# Patient Record
Sex: MALE | Race: WHITE | NOT HISPANIC OR LATINO | URBAN - METROPOLITAN AREA
[De-identification: names, ages, dates, MRNs, and addresses within clinical notes are randomized per-mention and may not be internally consistent; named-entity substitution may affect disease eponyms.]

---

## 2017-06-19 ENCOUNTER — OUTPATIENT (OUTPATIENT)
Dept: OUTPATIENT SERVICES | Facility: HOSPITAL | Age: 67
LOS: 1 days | Discharge: HOME | End: 2017-06-19

## 2017-06-28 DIAGNOSIS — I48.91 UNSPECIFIED ATRIAL FIBRILLATION: ICD-10-CM

## 2017-07-03 ENCOUNTER — OUTPATIENT (OUTPATIENT)
Dept: OUTPATIENT SERVICES | Facility: HOSPITAL | Age: 67
LOS: 1 days | Discharge: HOME | End: 2017-07-03

## 2017-07-03 DIAGNOSIS — I48.91 UNSPECIFIED ATRIAL FIBRILLATION: ICD-10-CM

## 2017-07-17 ENCOUNTER — OUTPATIENT (OUTPATIENT)
Dept: OUTPATIENT SERVICES | Facility: HOSPITAL | Age: 67
LOS: 1 days | Discharge: HOME | End: 2017-07-17

## 2017-07-17 DIAGNOSIS — I48.91 UNSPECIFIED ATRIAL FIBRILLATION: ICD-10-CM

## 2017-07-31 ENCOUNTER — OUTPATIENT (OUTPATIENT)
Dept: OUTPATIENT SERVICES | Facility: HOSPITAL | Age: 67
LOS: 1 days | Discharge: HOME | End: 2017-07-31

## 2017-07-31 DIAGNOSIS — I48.91 UNSPECIFIED ATRIAL FIBRILLATION: ICD-10-CM

## 2017-08-14 ENCOUNTER — OUTPATIENT (OUTPATIENT)
Dept: OUTPATIENT SERVICES | Facility: HOSPITAL | Age: 67
LOS: 1 days | Discharge: HOME | End: 2017-08-14

## 2017-08-14 DIAGNOSIS — I48.91 UNSPECIFIED ATRIAL FIBRILLATION: ICD-10-CM

## 2017-08-28 ENCOUNTER — OUTPATIENT (OUTPATIENT)
Dept: OUTPATIENT SERVICES | Facility: HOSPITAL | Age: 67
LOS: 1 days | Discharge: HOME | End: 2017-08-28

## 2017-08-28 DIAGNOSIS — I48.91 UNSPECIFIED ATRIAL FIBRILLATION: ICD-10-CM

## 2017-09-11 ENCOUNTER — OUTPATIENT (OUTPATIENT)
Dept: OUTPATIENT SERVICES | Facility: HOSPITAL | Age: 67
LOS: 1 days | Discharge: HOME | End: 2017-09-11

## 2017-09-11 DIAGNOSIS — I48.91 UNSPECIFIED ATRIAL FIBRILLATION: ICD-10-CM

## 2017-09-25 ENCOUNTER — OUTPATIENT (OUTPATIENT)
Dept: OUTPATIENT SERVICES | Facility: HOSPITAL | Age: 67
LOS: 1 days | Discharge: HOME | End: 2017-09-25

## 2017-09-25 DIAGNOSIS — I48.91 UNSPECIFIED ATRIAL FIBRILLATION: ICD-10-CM

## 2017-10-09 ENCOUNTER — OUTPATIENT (OUTPATIENT)
Dept: OUTPATIENT SERVICES | Facility: HOSPITAL | Age: 67
LOS: 1 days | Discharge: HOME | End: 2017-10-09

## 2017-10-09 DIAGNOSIS — I48.91 UNSPECIFIED ATRIAL FIBRILLATION: ICD-10-CM

## 2017-10-23 ENCOUNTER — OUTPATIENT (OUTPATIENT)
Dept: OUTPATIENT SERVICES | Facility: HOSPITAL | Age: 67
LOS: 1 days | Discharge: HOME | End: 2017-10-23

## 2017-10-23 DIAGNOSIS — E78.5 HYPERLIPIDEMIA, UNSPECIFIED: ICD-10-CM

## 2017-10-23 DIAGNOSIS — E55.9 VITAMIN D DEFICIENCY, UNSPECIFIED: ICD-10-CM

## 2017-10-23 DIAGNOSIS — R60.9 EDEMA, UNSPECIFIED: ICD-10-CM

## 2017-10-23 DIAGNOSIS — I10 ESSENTIAL (PRIMARY) HYPERTENSION: ICD-10-CM

## 2017-10-23 DIAGNOSIS — I48.91 UNSPECIFIED ATRIAL FIBRILLATION: ICD-10-CM

## 2017-11-06 ENCOUNTER — OUTPATIENT (OUTPATIENT)
Dept: OUTPATIENT SERVICES | Facility: HOSPITAL | Age: 67
LOS: 1 days | Discharge: HOME | End: 2017-11-06

## 2017-11-06 DIAGNOSIS — I48.91 UNSPECIFIED ATRIAL FIBRILLATION: ICD-10-CM

## 2017-11-20 ENCOUNTER — OUTPATIENT (OUTPATIENT)
Dept: OUTPATIENT SERVICES | Facility: HOSPITAL | Age: 67
LOS: 1 days | Discharge: HOME | End: 2017-11-20

## 2017-11-20 DIAGNOSIS — I48.91 UNSPECIFIED ATRIAL FIBRILLATION: ICD-10-CM

## 2017-12-04 ENCOUNTER — OUTPATIENT (OUTPATIENT)
Dept: OUTPATIENT SERVICES | Facility: HOSPITAL | Age: 67
LOS: 1 days | Discharge: HOME | End: 2017-12-04

## 2017-12-04 DIAGNOSIS — I48.91 UNSPECIFIED ATRIAL FIBRILLATION: ICD-10-CM

## 2017-12-19 ENCOUNTER — OUTPATIENT (OUTPATIENT)
Dept: OUTPATIENT SERVICES | Facility: HOSPITAL | Age: 67
LOS: 1 days | Discharge: HOME | End: 2017-12-19

## 2017-12-19 DIAGNOSIS — Z79.01 LONG TERM (CURRENT) USE OF ANTICOAGULANTS: ICD-10-CM

## 2018-01-02 ENCOUNTER — OUTPATIENT (OUTPATIENT)
Dept: OUTPATIENT SERVICES | Facility: HOSPITAL | Age: 68
LOS: 1 days | Discharge: HOME | End: 2018-01-02

## 2018-01-02 DIAGNOSIS — I48.91 UNSPECIFIED ATRIAL FIBRILLATION: ICD-10-CM

## 2018-01-02 DIAGNOSIS — Z79.01 LONG TERM (CURRENT) USE OF ANTICOAGULANTS: ICD-10-CM

## 2018-01-15 ENCOUNTER — OUTPATIENT (OUTPATIENT)
Dept: OUTPATIENT SERVICES | Facility: HOSPITAL | Age: 68
LOS: 1 days | Discharge: HOME | End: 2018-01-15

## 2018-01-15 DIAGNOSIS — I48.91 UNSPECIFIED ATRIAL FIBRILLATION: ICD-10-CM

## 2018-01-15 DIAGNOSIS — Z79.01 LONG TERM (CURRENT) USE OF ANTICOAGULANTS: ICD-10-CM

## 2018-01-29 ENCOUNTER — OUTPATIENT (OUTPATIENT)
Dept: OUTPATIENT SERVICES | Facility: HOSPITAL | Age: 68
LOS: 1 days | Discharge: HOME | End: 2018-01-29

## 2018-01-29 DIAGNOSIS — I48.91 UNSPECIFIED ATRIAL FIBRILLATION: ICD-10-CM

## 2018-01-29 DIAGNOSIS — Z79.01 LONG TERM (CURRENT) USE OF ANTICOAGULANTS: ICD-10-CM

## 2018-02-12 ENCOUNTER — OUTPATIENT (OUTPATIENT)
Dept: OUTPATIENT SERVICES | Facility: HOSPITAL | Age: 68
LOS: 1 days | Discharge: HOME | End: 2018-02-12

## 2018-02-12 DIAGNOSIS — Z79.01 LONG TERM (CURRENT) USE OF ANTICOAGULANTS: ICD-10-CM

## 2018-02-12 DIAGNOSIS — I48.91 UNSPECIFIED ATRIAL FIBRILLATION: ICD-10-CM

## 2018-02-26 ENCOUNTER — OUTPATIENT (OUTPATIENT)
Dept: OUTPATIENT SERVICES | Facility: HOSPITAL | Age: 68
LOS: 1 days | Discharge: HOME | End: 2018-02-26

## 2018-02-26 DIAGNOSIS — Z79.01 LONG TERM (CURRENT) USE OF ANTICOAGULANTS: ICD-10-CM

## 2018-02-26 DIAGNOSIS — I48.91 UNSPECIFIED ATRIAL FIBRILLATION: ICD-10-CM

## 2018-03-12 ENCOUNTER — OUTPATIENT (OUTPATIENT)
Dept: OUTPATIENT SERVICES | Facility: HOSPITAL | Age: 68
LOS: 1 days | Discharge: HOME | End: 2018-03-12

## 2018-03-12 DIAGNOSIS — I48.91 UNSPECIFIED ATRIAL FIBRILLATION: ICD-10-CM

## 2018-03-12 DIAGNOSIS — Z79.01 LONG TERM (CURRENT) USE OF ANTICOAGULANTS: ICD-10-CM

## 2018-03-26 ENCOUNTER — OUTPATIENT (OUTPATIENT)
Dept: OUTPATIENT SERVICES | Facility: HOSPITAL | Age: 68
LOS: 1 days | Discharge: HOME | End: 2018-03-26

## 2018-03-26 DIAGNOSIS — I48.91 UNSPECIFIED ATRIAL FIBRILLATION: ICD-10-CM

## 2018-03-26 DIAGNOSIS — Z79.01 LONG TERM (CURRENT) USE OF ANTICOAGULANTS: ICD-10-CM

## 2018-04-09 ENCOUNTER — OUTPATIENT (OUTPATIENT)
Dept: OUTPATIENT SERVICES | Facility: HOSPITAL | Age: 68
LOS: 1 days | Discharge: HOME | End: 2018-04-09

## 2018-04-09 DIAGNOSIS — Z79.01 LONG TERM (CURRENT) USE OF ANTICOAGULANTS: ICD-10-CM

## 2018-04-09 DIAGNOSIS — I48.91 UNSPECIFIED ATRIAL FIBRILLATION: ICD-10-CM

## 2018-04-23 ENCOUNTER — OUTPATIENT (OUTPATIENT)
Dept: OUTPATIENT SERVICES | Facility: HOSPITAL | Age: 68
LOS: 1 days | Discharge: HOME | End: 2018-04-23

## 2018-04-23 DIAGNOSIS — I25.10 ATHEROSCLEROTIC HEART DISEASE OF NATIVE CORONARY ARTERY WITHOUT ANGINA PECTORIS: ICD-10-CM

## 2018-04-23 DIAGNOSIS — I48.91 UNSPECIFIED ATRIAL FIBRILLATION: ICD-10-CM

## 2018-04-23 DIAGNOSIS — I10 ESSENTIAL (PRIMARY) HYPERTENSION: ICD-10-CM

## 2018-04-23 DIAGNOSIS — E78.5 HYPERLIPIDEMIA, UNSPECIFIED: ICD-10-CM

## 2018-04-23 DIAGNOSIS — R31.9 HEMATURIA, UNSPECIFIED: ICD-10-CM

## 2018-04-23 DIAGNOSIS — Z79.01 LONG TERM (CURRENT) USE OF ANTICOAGULANTS: ICD-10-CM

## 2018-05-07 ENCOUNTER — OUTPATIENT (OUTPATIENT)
Dept: OUTPATIENT SERVICES | Facility: HOSPITAL | Age: 68
LOS: 1 days | Discharge: HOME | End: 2018-05-07

## 2018-05-07 DIAGNOSIS — I10 ESSENTIAL (PRIMARY) HYPERTENSION: ICD-10-CM

## 2018-05-07 DIAGNOSIS — I25.10 ATHEROSCLEROTIC HEART DISEASE OF NATIVE CORONARY ARTERY WITHOUT ANGINA PECTORIS: ICD-10-CM

## 2018-05-07 DIAGNOSIS — I48.91 UNSPECIFIED ATRIAL FIBRILLATION: ICD-10-CM

## 2018-05-07 DIAGNOSIS — Z79.01 LONG TERM (CURRENT) USE OF ANTICOAGULANTS: ICD-10-CM

## 2018-05-21 ENCOUNTER — OUTPATIENT (OUTPATIENT)
Dept: OUTPATIENT SERVICES | Facility: HOSPITAL | Age: 68
LOS: 1 days | Discharge: HOME | End: 2018-05-21

## 2018-05-21 DIAGNOSIS — Z79.01 LONG TERM (CURRENT) USE OF ANTICOAGULANTS: ICD-10-CM

## 2018-05-21 DIAGNOSIS — I48.91 UNSPECIFIED ATRIAL FIBRILLATION: ICD-10-CM

## 2018-06-04 ENCOUNTER — OUTPATIENT (OUTPATIENT)
Dept: OUTPATIENT SERVICES | Facility: HOSPITAL | Age: 68
LOS: 1 days | Discharge: HOME | End: 2018-06-04

## 2018-06-04 DIAGNOSIS — Z79.01 LONG TERM (CURRENT) USE OF ANTICOAGULANTS: ICD-10-CM

## 2018-06-04 DIAGNOSIS — I48.91 UNSPECIFIED ATRIAL FIBRILLATION: ICD-10-CM

## 2018-06-18 ENCOUNTER — OUTPATIENT (OUTPATIENT)
Dept: OUTPATIENT SERVICES | Facility: HOSPITAL | Age: 68
LOS: 1 days | Discharge: HOME | End: 2018-06-18

## 2018-06-18 DIAGNOSIS — Z79.01 LONG TERM (CURRENT) USE OF ANTICOAGULANTS: ICD-10-CM

## 2018-07-02 ENCOUNTER — OUTPATIENT (OUTPATIENT)
Dept: OUTPATIENT SERVICES | Facility: HOSPITAL | Age: 68
LOS: 1 days | Discharge: HOME | End: 2018-07-02

## 2018-07-02 DIAGNOSIS — I48.91 UNSPECIFIED ATRIAL FIBRILLATION: ICD-10-CM

## 2018-07-16 ENCOUNTER — OUTPATIENT (OUTPATIENT)
Dept: OUTPATIENT SERVICES | Facility: HOSPITAL | Age: 68
LOS: 1 days | Discharge: HOME | End: 2018-07-16

## 2018-07-16 DIAGNOSIS — I48.91 UNSPECIFIED ATRIAL FIBRILLATION: ICD-10-CM

## 2018-07-30 ENCOUNTER — OUTPATIENT (OUTPATIENT)
Dept: OUTPATIENT SERVICES | Facility: HOSPITAL | Age: 68
LOS: 1 days | Discharge: HOME | End: 2018-07-30

## 2018-07-30 DIAGNOSIS — I48.91 UNSPECIFIED ATRIAL FIBRILLATION: ICD-10-CM

## 2018-08-13 ENCOUNTER — OUTPATIENT (OUTPATIENT)
Dept: OUTPATIENT SERVICES | Facility: HOSPITAL | Age: 68
LOS: 1 days | Discharge: HOME | End: 2018-08-13

## 2018-08-13 DIAGNOSIS — I48.91 UNSPECIFIED ATRIAL FIBRILLATION: ICD-10-CM

## 2018-08-27 ENCOUNTER — OUTPATIENT (OUTPATIENT)
Dept: OUTPATIENT SERVICES | Facility: HOSPITAL | Age: 68
LOS: 1 days | Discharge: HOME | End: 2018-08-27

## 2018-08-27 DIAGNOSIS — I48.91 UNSPECIFIED ATRIAL FIBRILLATION: ICD-10-CM

## 2018-09-10 ENCOUNTER — OUTPATIENT (OUTPATIENT)
Dept: OUTPATIENT SERVICES | Facility: HOSPITAL | Age: 68
LOS: 1 days | Discharge: HOME | End: 2018-09-10

## 2018-09-10 DIAGNOSIS — I48.91 UNSPECIFIED ATRIAL FIBRILLATION: ICD-10-CM

## 2018-09-24 ENCOUNTER — OUTPATIENT (OUTPATIENT)
Dept: OUTPATIENT SERVICES | Facility: HOSPITAL | Age: 68
LOS: 1 days | Discharge: HOME | End: 2018-09-24

## 2018-09-24 DIAGNOSIS — I48.91 UNSPECIFIED ATRIAL FIBRILLATION: ICD-10-CM

## 2018-09-27 ENCOUNTER — OUTPATIENT (OUTPATIENT)
Dept: OUTPATIENT SERVICES | Facility: HOSPITAL | Age: 68
LOS: 1 days | Discharge: HOME | End: 2018-09-27

## 2018-09-27 DIAGNOSIS — I10 ESSENTIAL (PRIMARY) HYPERTENSION: ICD-10-CM

## 2018-09-27 DIAGNOSIS — E11.9 TYPE 2 DIABETES MELLITUS WITHOUT COMPLICATIONS: ICD-10-CM

## 2018-09-27 DIAGNOSIS — E55.9 VITAMIN D DEFICIENCY, UNSPECIFIED: ICD-10-CM

## 2018-09-27 DIAGNOSIS — I63.50 CEREBRAL INFARCTION DUE TO UNSPECIFIED OCCLUSION OR STENOSIS OF UNSPECIFIED CEREBRAL ARTERY: ICD-10-CM

## 2018-09-27 DIAGNOSIS — I48.91 UNSPECIFIED ATRIAL FIBRILLATION: ICD-10-CM

## 2018-09-27 DIAGNOSIS — E78.5 HYPERLIPIDEMIA, UNSPECIFIED: ICD-10-CM

## 2018-10-08 ENCOUNTER — OUTPATIENT (OUTPATIENT)
Dept: OUTPATIENT SERVICES | Facility: HOSPITAL | Age: 68
LOS: 1 days | Discharge: HOME | End: 2018-10-08

## 2018-10-08 DIAGNOSIS — I48.91 UNSPECIFIED ATRIAL FIBRILLATION: ICD-10-CM

## 2018-10-11 ENCOUNTER — OUTPATIENT (OUTPATIENT)
Dept: OUTPATIENT SERVICES | Facility: HOSPITAL | Age: 68
LOS: 1 days | Discharge: HOME | End: 2018-10-11

## 2018-10-11 DIAGNOSIS — I25.10 ATHEROSCLEROTIC HEART DISEASE OF NATIVE CORONARY ARTERY WITHOUT ANGINA PECTORIS: ICD-10-CM

## 2018-10-15 ENCOUNTER — OUTPATIENT (OUTPATIENT)
Dept: OUTPATIENT SERVICES | Facility: HOSPITAL | Age: 68
LOS: 1 days | Discharge: HOME | End: 2018-10-15

## 2018-10-15 DIAGNOSIS — Z02.9 ENCOUNTER FOR ADMINISTRATIVE EXAMINATIONS, UNSPECIFIED: ICD-10-CM

## 2018-10-15 DIAGNOSIS — I10 ESSENTIAL (PRIMARY) HYPERTENSION: ICD-10-CM

## 2018-10-15 DIAGNOSIS — I25.10 ATHEROSCLEROTIC HEART DISEASE OF NATIVE CORONARY ARTERY WITHOUT ANGINA PECTORIS: ICD-10-CM

## 2018-10-22 ENCOUNTER — OUTPATIENT (OUTPATIENT)
Dept: OUTPATIENT SERVICES | Facility: HOSPITAL | Age: 68
LOS: 1 days | Discharge: HOME | End: 2018-10-22

## 2018-10-22 DIAGNOSIS — I48.91 UNSPECIFIED ATRIAL FIBRILLATION: ICD-10-CM

## 2018-11-05 ENCOUNTER — OUTPATIENT (OUTPATIENT)
Dept: OUTPATIENT SERVICES | Facility: HOSPITAL | Age: 68
LOS: 1 days | Discharge: HOME | End: 2018-11-05

## 2018-11-05 DIAGNOSIS — I48.91 UNSPECIFIED ATRIAL FIBRILLATION: ICD-10-CM

## 2018-11-19 ENCOUNTER — OUTPATIENT (OUTPATIENT)
Dept: OUTPATIENT SERVICES | Facility: HOSPITAL | Age: 68
LOS: 1 days | Discharge: HOME | End: 2018-11-19

## 2018-11-19 DIAGNOSIS — I48.91 UNSPECIFIED ATRIAL FIBRILLATION: ICD-10-CM

## 2018-12-03 ENCOUNTER — OUTPATIENT (OUTPATIENT)
Dept: OUTPATIENT SERVICES | Facility: HOSPITAL | Age: 68
LOS: 1 days | Discharge: HOME | End: 2018-12-03

## 2018-12-03 DIAGNOSIS — I48.91 UNSPECIFIED ATRIAL FIBRILLATION: ICD-10-CM

## 2018-12-17 ENCOUNTER — OUTPATIENT (OUTPATIENT)
Dept: OUTPATIENT SERVICES | Facility: HOSPITAL | Age: 68
LOS: 1 days | Discharge: HOME | End: 2018-12-17

## 2018-12-17 DIAGNOSIS — I48.91 UNSPECIFIED ATRIAL FIBRILLATION: ICD-10-CM

## 2018-12-31 ENCOUNTER — OUTPATIENT (OUTPATIENT)
Dept: OUTPATIENT SERVICES | Facility: HOSPITAL | Age: 68
LOS: 1 days | Discharge: HOME | End: 2018-12-31

## 2018-12-31 DIAGNOSIS — I48.91 UNSPECIFIED ATRIAL FIBRILLATION: ICD-10-CM

## 2019-01-14 ENCOUNTER — OUTPATIENT (OUTPATIENT)
Dept: OUTPATIENT SERVICES | Facility: HOSPITAL | Age: 69
LOS: 1 days | Discharge: HOME | End: 2019-01-14

## 2019-01-14 DIAGNOSIS — I48.91 UNSPECIFIED ATRIAL FIBRILLATION: ICD-10-CM

## 2019-01-28 ENCOUNTER — OUTPATIENT (OUTPATIENT)
Dept: OUTPATIENT SERVICES | Facility: HOSPITAL | Age: 69
LOS: 1 days | Discharge: HOME | End: 2019-01-28

## 2019-01-28 DIAGNOSIS — I48.91 UNSPECIFIED ATRIAL FIBRILLATION: ICD-10-CM

## 2019-02-04 ENCOUNTER — OUTPATIENT (OUTPATIENT)
Dept: OUTPATIENT SERVICES | Facility: HOSPITAL | Age: 69
LOS: 1 days | Discharge: HOME | End: 2019-02-04

## 2019-02-04 DIAGNOSIS — E78.5 HYPERLIPIDEMIA, UNSPECIFIED: ICD-10-CM

## 2019-02-04 DIAGNOSIS — I25.10 ATHEROSCLEROTIC HEART DISEASE OF NATIVE CORONARY ARTERY WITHOUT ANGINA PECTORIS: ICD-10-CM

## 2019-02-04 DIAGNOSIS — I10 ESSENTIAL (PRIMARY) HYPERTENSION: ICD-10-CM

## 2019-02-11 ENCOUNTER — OUTPATIENT (OUTPATIENT)
Dept: OUTPATIENT SERVICES | Facility: HOSPITAL | Age: 69
LOS: 1 days | Discharge: HOME | End: 2019-02-11

## 2019-02-11 DIAGNOSIS — I25.10 ATHEROSCLEROTIC HEART DISEASE OF NATIVE CORONARY ARTERY WITHOUT ANGINA PECTORIS: ICD-10-CM

## 2019-02-19 ENCOUNTER — INPATIENT (INPATIENT)
Facility: HOSPITAL | Age: 69
LOS: 2 days | Discharge: NURSING, REHAB OR LONG-TERM CARE | End: 2019-02-22
Attending: INTERNAL MEDICINE | Admitting: INTERNAL MEDICINE
Payer: MEDICARE

## 2019-02-19 VITALS
OXYGEN SATURATION: 99 % | RESPIRATION RATE: 18 BRPM | SYSTOLIC BLOOD PRESSURE: 158 MMHG | HEART RATE: 86 BPM | DIASTOLIC BLOOD PRESSURE: 95 MMHG | TEMPERATURE: 98 F

## 2019-02-19 LAB
ALBUMIN SERPL ELPH-MCNC: 3.6 G/DL — SIGNIFICANT CHANGE UP (ref 3.5–5.2)
ALP SERPL-CCNC: 100 U/L — SIGNIFICANT CHANGE UP (ref 30–115)
ALT FLD-CCNC: 13 U/L — SIGNIFICANT CHANGE UP (ref 0–41)
ANION GAP SERPL CALC-SCNC: 7 MMOL/L — SIGNIFICANT CHANGE UP (ref 7–14)
APPEARANCE UR: CLEAR — SIGNIFICANT CHANGE UP
APTT BLD: 43.9 SEC — HIGH (ref 27–39.2)
AST SERPL-CCNC: 13 U/L — SIGNIFICANT CHANGE UP (ref 0–41)
BASE EXCESS BLDV CALC-SCNC: 4.3 MMOL/L — HIGH (ref -2–2)
BASOPHILS # BLD AUTO: 0.05 K/UL — SIGNIFICANT CHANGE UP (ref 0–0.2)
BASOPHILS NFR BLD AUTO: 0.5 % — SIGNIFICANT CHANGE UP (ref 0–1)
BILIRUB SERPL-MCNC: 1.3 MG/DL — HIGH (ref 0.2–1.2)
BILIRUB UR-MCNC: NEGATIVE — SIGNIFICANT CHANGE UP
BUN SERPL-MCNC: 25 MG/DL — HIGH (ref 10–20)
CA-I SERPL-SCNC: 1.21 MMOL/L — SIGNIFICANT CHANGE UP (ref 1.12–1.3)
CALCIUM SERPL-MCNC: 8.6 MG/DL — SIGNIFICANT CHANGE UP (ref 8.5–10.1)
CHLORIDE SERPL-SCNC: 108 MMOL/L — SIGNIFICANT CHANGE UP (ref 98–110)
CO2 SERPL-SCNC: 31 MMOL/L — SIGNIFICANT CHANGE UP (ref 17–32)
COLOR SPEC: YELLOW — SIGNIFICANT CHANGE UP
CREAT SERPL-MCNC: 1.3 MG/DL — SIGNIFICANT CHANGE UP (ref 0.7–1.5)
DIFF PNL FLD: ABNORMAL
EOSINOPHIL # BLD AUTO: 0.1 K/UL — SIGNIFICANT CHANGE UP (ref 0–0.7)
EOSINOPHIL NFR BLD AUTO: 1 % — SIGNIFICANT CHANGE UP (ref 0–8)
GAS PNL BLDV: 145 MMOL/L — SIGNIFICANT CHANGE UP (ref 136–145)
GAS PNL BLDV: SIGNIFICANT CHANGE UP
GLUCOSE SERPL-MCNC: 114 MG/DL — HIGH (ref 70–99)
GLUCOSE UR QL: NEGATIVE MG/DL — SIGNIFICANT CHANGE UP
HCO3 BLDV-SCNC: 30 MMOL/L — HIGH (ref 22–29)
HCT VFR BLD CALC: 38 % — LOW (ref 42–52)
HCT VFR BLDA CALC: 40.5 % — SIGNIFICANT CHANGE UP (ref 34–44)
HGB BLD CALC-MCNC: 13.2 G/DL — LOW (ref 14–18)
HGB BLD-MCNC: 12.2 G/DL — LOW (ref 14–18)
HOROWITZ INDEX BLDV+IHG-RTO: 21 — SIGNIFICANT CHANGE UP
IMM GRANULOCYTES NFR BLD AUTO: 0.3 % — SIGNIFICANT CHANGE UP (ref 0.1–0.3)
INR BLD: 3.51 RATIO — HIGH (ref 0.65–1.3)
KETONES UR-MCNC: 15
LACTATE BLDV-MCNC: 1 MMOL/L — SIGNIFICANT CHANGE UP (ref 0.5–1.6)
LEUKOCYTE ESTERASE UR-ACNC: NEGATIVE — SIGNIFICANT CHANGE UP
LYMPHOCYTES # BLD AUTO: 0.75 K/UL — LOW (ref 1.2–3.4)
LYMPHOCYTES # BLD AUTO: 7.6 % — LOW (ref 20.5–51.1)
MCHC RBC-ENTMCNC: 28.6 PG — SIGNIFICANT CHANGE UP (ref 27–31)
MCHC RBC-ENTMCNC: 32.1 G/DL — SIGNIFICANT CHANGE UP (ref 32–37)
MCV RBC AUTO: 89 FL — SIGNIFICANT CHANGE UP (ref 80–94)
MONOCYTES # BLD AUTO: 0.71 K/UL — HIGH (ref 0.1–0.6)
MONOCYTES NFR BLD AUTO: 7.2 % — SIGNIFICANT CHANGE UP (ref 1.7–9.3)
NEUTROPHILS # BLD AUTO: 8.26 K/UL — HIGH (ref 1.4–6.5)
NEUTROPHILS NFR BLD AUTO: 83.4 % — HIGH (ref 42.2–75.2)
NITRITE UR-MCNC: NEGATIVE — SIGNIFICANT CHANGE UP
NRBC # BLD: 0 /100 WBCS — SIGNIFICANT CHANGE UP (ref 0–0)
PCO2 BLDV: 51 MMHG — SIGNIFICANT CHANGE UP (ref 41–51)
PH BLDV: 7.38 — SIGNIFICANT CHANGE UP (ref 7.26–7.43)
PH UR: 6.5 — SIGNIFICANT CHANGE UP (ref 5–8)
PLATELET # BLD AUTO: 212 K/UL — SIGNIFICANT CHANGE UP (ref 130–400)
PO2 BLDV: 23 MMHG — SIGNIFICANT CHANGE UP (ref 20–40)
POTASSIUM BLDV-SCNC: 4.1 MMOL/L — SIGNIFICANT CHANGE UP (ref 3.3–5.6)
POTASSIUM SERPL-MCNC: 4.2 MMOL/L — SIGNIFICANT CHANGE UP (ref 3.5–5)
POTASSIUM SERPL-SCNC: 4.2 MMOL/L — SIGNIFICANT CHANGE UP (ref 3.5–5)
PROT SERPL-MCNC: 5.9 G/DL — LOW (ref 6–8)
PROT UR-MCNC: 100 MG/DL
PROTHROM AB SERPL-ACNC: 39.9 SEC — HIGH (ref 9.95–12.87)
RBC # BLD: 4.27 M/UL — LOW (ref 4.7–6.1)
RBC # FLD: 14.6 % — HIGH (ref 11.5–14.5)
RBC CASTS # UR COMP ASSIST: SIGNIFICANT CHANGE UP /HPF
SAO2 % BLDV: 40 % — SIGNIFICANT CHANGE UP
SODIUM SERPL-SCNC: 146 MMOL/L — SIGNIFICANT CHANGE UP (ref 135–146)
SP GR SPEC: 1.02 — SIGNIFICANT CHANGE UP (ref 1.01–1.03)
TROPONIN T SERPL-MCNC: <0.01 NG/ML — SIGNIFICANT CHANGE UP
UROBILINOGEN FLD QL: 2 MG/DL (ref 0.2–0.2)
WBC # BLD: 9.9 K/UL — SIGNIFICANT CHANGE UP (ref 4.8–10.8)
WBC # FLD AUTO: 9.9 K/UL — SIGNIFICANT CHANGE UP (ref 4.8–10.8)
WBC UR QL: SIGNIFICANT CHANGE UP /HPF

## 2019-02-19 RX ORDER — METOPROLOL TARTRATE 50 MG
50 TABLET ORAL DAILY
Qty: 0 | Refills: 0 | Status: DISCONTINUED | OUTPATIENT
Start: 2019-02-19 | End: 2019-02-22

## 2019-02-19 RX ORDER — FUROSEMIDE 40 MG
20 TABLET ORAL DAILY
Qty: 0 | Refills: 0 | Status: DISCONTINUED | OUTPATIENT
Start: 2019-02-19 | End: 2019-02-22

## 2019-02-19 RX ORDER — DIGOXIN 250 MCG
0.12 TABLET ORAL DAILY
Qty: 0 | Refills: 0 | Status: DISCONTINUED | OUTPATIENT
Start: 2019-02-19 | End: 2019-02-22

## 2019-02-19 RX ORDER — AMLODIPINE BESYLATE 2.5 MG/1
0 TABLET ORAL
Qty: 0 | Refills: 0 | COMMUNITY

## 2019-02-19 RX ORDER — WARFARIN SODIUM 2.5 MG/1
2.5 TABLET ORAL ONCE
Qty: 0 | Refills: 0 | Status: COMPLETED | OUTPATIENT
Start: 2019-02-19 | End: 2019-02-19

## 2019-02-19 RX ORDER — AMLODIPINE BESYLATE 2.5 MG/1
5 TABLET ORAL DAILY
Qty: 0 | Refills: 0 | Status: DISCONTINUED | OUTPATIENT
Start: 2019-02-19 | End: 2019-02-22

## 2019-02-19 RX ORDER — METOPROLOL TARTRATE 50 MG
1 TABLET ORAL
Qty: 0 | Refills: 0 | COMMUNITY

## 2019-02-19 RX ORDER — BACLOFEN 100 %
10 POWDER (GRAM) MISCELLANEOUS DAILY
Qty: 0 | Refills: 0 | Status: DISCONTINUED | OUTPATIENT
Start: 2019-02-19 | End: 2019-02-22

## 2019-02-19 RX ORDER — ATORVASTATIN CALCIUM 80 MG/1
20 TABLET, FILM COATED ORAL AT BEDTIME
Qty: 0 | Refills: 0 | Status: DISCONTINUED | OUTPATIENT
Start: 2019-02-19 | End: 2019-02-22

## 2019-02-19 RX ADMIN — Medication 50 MILLIGRAM(S): at 18:14

## 2019-02-19 RX ADMIN — Medication 10 MILLIGRAM(S): at 18:15

## 2019-02-19 RX ADMIN — ATORVASTATIN CALCIUM 20 MILLIGRAM(S): 80 TABLET, FILM COATED ORAL at 21:51

## 2019-02-19 RX ADMIN — AMLODIPINE BESYLATE 5 MILLIGRAM(S): 2.5 TABLET ORAL at 18:14

## 2019-02-19 NOTE — ED PROVIDER NOTE - PMH
TIA (transient ischemic attack) Afib    HTN (hypertension)    Sciatica    TIA (transient ischemic attack) Afib    HTN (hypertension)    Kidney stone    Sciatica    TIA (transient ischemic attack)  CVA 12 years ago with right sided weakness, uses four prong cane

## 2019-02-19 NOTE — H&P ADULT - ASSESSMENT
DX; FALL        INABILITY TO AMBULATE  A/P;admit to med-surg  pt/rehab  labs/ecg/c-xray  continue previous meds  pain mgmt  sw consult  monitor vss  monitor pt

## 2019-02-19 NOTE — H&P ADULT - NSHPLABSRESULTS_GEN_ALL_CORE
12.2   9.90  )-----------( 212      ( 2019 10:45 )             38.0       02-    146  |  108  |  25<H>  ----------------------------<  114<H>  4.2   |  31  |  1.3    Ca    8.6      2019 10:45    TPro  5.9<L>  /  Alb  3.6  /  TBili  1.3<H>  /  DBili  x   /  AST  13  /  ALT  13  /  AlkPhos  100                    Urinalysis Basic - ( 2019 13:09 )    Color: Yellow / Appearance: Clear / S.020 / pH: x  Gluc: x / Ketone: 15  / Bili: Negative / Urobili: 2.0 mg/dL   Blood: x / Protein: 100 mg/dL / Nitrite: Negative   Leuk Esterase: Negative / RBC: 1-2 /HPF / WBC 3-5 /HPF   Sq Epi: x / Non Sq Epi: x / Bacteria: x        PT/INR - ( 2019 12:17 )   PT: 39.90 sec;   INR: 3.51 ratio         PTT - ( 2019 12:17 )  PTT:43.9 sec    Lactate Trend      CARDIAC MARKERS ( 2019 10:45 )  x     / <0.01 ng/mL / x     / x     / x

## 2019-02-19 NOTE — ED PROVIDER NOTE - CLINICAL SUMMARY MEDICAL DECISION MAKING FREE TEXT BOX
68y male af on coumadin c/o inability to ambulate s/p mechanical fall onto right side,  no head trauma, c/o right lateral hip pain, no other symptoms, on exam vital signs appreciated, well appearing, answers most of my questions appropriately with some paraphrasic errors, head nc/at, perrla, conj pink neck supple cor irreg lungs cta abd snt pelvis stable, has hematoma from right gluteus to right lateral thigh, no warmth/erythema, compartments soft, NVI distally (beyond baseline weakness), no pain on hip rotation but unable to bear weight due to pain, imaging performed, as above, pt 3d out from fall with stable VS and stable Hb, d/w Dr Jimenes PMD, will admit for rehab, hold coumadin today/monitor INR

## 2019-02-19 NOTE — ED PROVIDER NOTE - NS ED ROS FT
Constitutional: (-) fever(-) chills  Eyes: (-) visual changes (-) eye pain   ENMT: (-) nasal or chest congestion (-) runny nose (-) sore throat (-) ear pain (-) ear discharge or infections (-) neck pain (-) neck stiffness  Cardiac: (-) chest pain  (-) palpitations (-) syncope  Respiratory: (-) cough (-) SOB  GI: (-) nausea (-) vomiting (-) diarrhea (-) abdominal pain   : (-) dysuria (-) increased frequency  MS: (-) back pain (-) myalgia (-) muscle weakness (-)  joint pain  Neuro: (-) headache (-) dizziness (-) numbness/tingling to extremities B/L (-) weakness (-) LOC (-) head injury (-) AMS (-) sadlde anesthesia (-) tremors  Skin: (-) rash (-) laceration  Psychiatric: (-) hallucinations (-) SI/HI (-) intoxication  Except as documented in the HPI, all other systems are negative. Constitutional: (-) fever(-) chills  Eyes: (-) visual changes (-) eye pain   ENMT: (-) nasal or chest congestion (-) runny nose (-) sore throat (-) ear pain (-) ear discharge or infections (-) neck pain (-) neck stiffness  Cardiac: (-) chest pain  (-) palpitations (-) syncope  Respiratory: (-) cough (-) SOB  GI: (-) nausea (-) vomiting (-) diarrhea (-) abdominal pain   : (-) dysuria (-) increased frequency  MS: (+) right hip pain (-) back pain (-) myalgia (-) muscle weakness (-)  joint pain  Neuro: (+) unable to ambulate (-) headache (-) dizziness (-) numbness/tingling to extremities B/L (-) weakness (-) LOC (-) head injury   Skin: (-) rash (-) laceration  Psychiatric: (-) hallucinations (-) SI/HI (-) intoxication  Except as documented in the HPI, all other systems are negative.

## 2019-02-19 NOTE — ED PROVIDER NOTE - OBJECTIVE STATEMENT
67 yo male with PMH of TIA (12 years ago with residual right sided weakness and mild aphasia patient fully understands, but can only respond with yes or no), right sided sciatica, HTN, Afib, on coumadin presents to the ED with sister c/o right sided hip pain and inability to ambulate s/p fall that occurred last Saturday.  Sister states that the patient felt off balance on Saturday and fell and landed on right side of body.  Patient walks with a cane at baseline, but sister states that the patient has not been able to ambulate on his own since the fall on Saturday.  Patient has not taken anything for the pain. Patient denies fever, chills, LOC, head injury, chest pain, SOB, abdominal pain, N/V/D, incontinence, hematuria, dysuria, dizziness, headache, or vision changes.

## 2019-02-19 NOTE — H&P ADULT - HISTORY OF PRESENT ILLNESS
68Y OLD MALE PMHX BELOW PRESENTS TO THE ED COMPLAINING OF INABILITY TO AMBULATE SINCE FALL LAST WEEK SATURDAY. AS PER SISTER PT AMBULATES WITH CANE SINCE CVA YEARS AGO RESULTING IN RIGHT SIDED WEAKNESS, TRIPPED AND FELL ON RIGHT SIDE SUSTAINING BRUISE TO RIGHT HIP. PT ADMITS TO RIGHT HIP PAIN AND DIFFICULTY TO AMBULATE SINCE THEN, BUT  DENIES CHEST/ABDOMINAL PAIN, SOB, DIZZINESS, LIGHTHEADEDNESS, HEAD TRAUMA, N/V, FEVER/CHILLS OR LOC.

## 2019-02-19 NOTE — ED PROVIDER NOTE - PHYSICAL EXAMINATION
GENERAL: Well-nourished, Well-developed. NAD.  HEAD: No visible or palpable bumps or hematomas. No ecchymosis behind ears B/L.  Eyes: PERRLA, EOMI. No asymmetry. No nystagmus. No conjunctival injection. Non-icteric sclera.  ENMT: MMM. No pharyngeal erythema or exudates. Uvula midline. No oral lesions. No broken teeth.  Nares patent.   Neck: Supple. No LAD. No cervical midline TTP. FROM  CVS: No reproducible chest wall tenderness. Normal S1,S2. No murmurs appreciated on auscultation   RESP: No use of accessory muscles. Chest rise symmetrical with good expansion. Lungs clear to auscultation B/L. No wheezing, rales, or rhonchi auscultated.  GI: Normal auscultation of bowel sounds in all 4 quadrants. Soft, Nontender, Nondistended. No guarding or rebound tenderness.   MSK: + TTP to right hip, thigh, and buttock with overlying ecchymosis and mild swelling secondary to fall. Limited ROM at baseline to right upper and lower extremity due to stroke in the past. Extremities w/o deformity. No midline spinal TTP.   Skin: Warm, Dry. No rashes or lesions. No lacerations. Good cap refill < 2 sec B/L.  EXT: + chronic venous insufficiences to lower extremities B/L. Radial and pedal pulses present B/L. No calf tenderness or swelling B/L. No pedal edema B/L.  Neuro: AA&O x 3. CNs II-XII grossly intact. Mild aphasia secondary to past stroke (Patient can only respond with yes or no, but can understand when spoken to). No slurring of speech. No facial droop. No tremors. Sensation grossly intact. Strength (left > right side). Unable to assess gait due to patient's inability to bare weight on right lower extremity. Normal Finger to nose exam.  Psych:  Appropriate mood and affect. Cooperative. Calm GENERAL: Well-nourished, Well-developed. NAD.  HEAD: No visible or palpable bumps or hematomas. No ecchymosis behind ears B/L.  Eyes: PERRLA, EOMI. No asymmetry. No nystagmus. No conjunctival injection. Non-icteric sclera.  ENMT: MMM. No pharyngeal erythema or exudates. Uvula midline. No oral lesions. No broken teeth.  Nares patent.   Neck: Supple. No LAD. No cervical midline TTP. FROM  CVS: No reproducible chest wall tenderness. Normal S1,S2. No murmurs appreciated on auscultation   RESP: No use of accessory muscles. Chest rise symmetrical with good expansion. Lungs clear to auscultation B/L. No wheezing, rales, or rhonchi auscultated.  GI: Normal auscultation of bowel sounds in all 4 quadrants. Soft, Nontender, Nondistended. No guarding or rebound tenderness.   MSK: + TTP to right hip, thigh, and buttock with overlying ecchymosis and mild swelling secondary to fall. Limited ROM at baseline to right upper and lower extremity due to stroke in the past. Extremities w/o deformity. No midline spinal TTP.   Skin: Warm, Dry. No rashes or lesions. No lacerations. Good cap refill < 2 sec B/L.  EXT: + chronic venous insufficiences with brown discoloration to lower extremities B/L. Radial and pedal pulses present B/L. No calf tenderness or swelling B/L. No pedal edema B/L.  Neuro: AA&O x 3. CNs II-XII grossly intact. Mild aphasia secondary to past stroke (Patient can only respond with yes or no, but can understand when spoken to). No slurring of speech. No facial droop. No tremors. Sensation grossly intact. Strength (left > right side). Unable to assess gait due to patient's inability to bare weight on right lower extremity. Normal Finger to nose exam.  Psych:  Appropriate mood and affect. Cooperative. Calm

## 2019-02-19 NOTE — ED ADULT NURSE NOTE - PMH
Afib    HTN (hypertension)    Kidney stone    Sciatica    TIA (transient ischemic attack)  CVA 12 years ago with right sided weakness, uses four prong cane

## 2019-02-19 NOTE — H&P ADULT - NSHPPHYSICALEXAM_GEN_ALL_CORE
PHYSICAL EXAM:  GENERAL: NAD, well-developed, well nourished, looks stated age  HEAD:  Atraumatic, Normocephalic  EYES: EOMI, PERRLA, conjunctiva and sclera clear  NECK: Supple, No JVD  CHEST/LUNG: bilateral EQUAL AIR ENTRY  HEART: S1,S2 Regular rate and rhythm  ABDOMEN: Soft, nontender, nondistended, Bowel sounds present and normoactive  EXTREMITIES:  2+ peripheral pulses bilaterally and symmetrically, right sided weakness  NEUROLOGY: right sided weakness, aphasic  SKIN: right hip ecchymosis

## 2019-02-20 DIAGNOSIS — T14.8XXA OTHER INJURY OF UNSPECIFIED BODY REGION, INITIAL ENCOUNTER: ICD-10-CM

## 2019-02-20 LAB
ANION GAP SERPL CALC-SCNC: 10 MMOL/L — SIGNIFICANT CHANGE UP (ref 7–14)
BASOPHILS # BLD AUTO: 0.04 K/UL — SIGNIFICANT CHANGE UP (ref 0–0.2)
BASOPHILS NFR BLD AUTO: 0.5 % — SIGNIFICANT CHANGE UP (ref 0–1)
BUN SERPL-MCNC: 27 MG/DL — HIGH (ref 10–20)
CALCIUM SERPL-MCNC: 8.2 MG/DL — LOW (ref 8.5–10.1)
CHLORIDE SERPL-SCNC: 105 MMOL/L — SIGNIFICANT CHANGE UP (ref 98–110)
CO2 SERPL-SCNC: 27 MMOL/L — SIGNIFICANT CHANGE UP (ref 17–32)
CREAT SERPL-MCNC: 1.2 MG/DL — SIGNIFICANT CHANGE UP (ref 0.7–1.5)
EOSINOPHIL # BLD AUTO: 0.16 K/UL — SIGNIFICANT CHANGE UP (ref 0–0.7)
EOSINOPHIL NFR BLD AUTO: 2 % — SIGNIFICANT CHANGE UP (ref 0–8)
GLUCOSE SERPL-MCNC: 95 MG/DL — SIGNIFICANT CHANGE UP (ref 70–99)
HCT VFR BLD CALC: 34.3 % — LOW (ref 42–52)
HCV AB S/CO SERPL IA: 0.07 S/CO — SIGNIFICANT CHANGE UP (ref 0–0.79)
HCV AB SERPL-IMP: SIGNIFICANT CHANGE UP
HGB BLD-MCNC: 10.9 G/DL — LOW (ref 14–18)
IMM GRANULOCYTES NFR BLD AUTO: 0.4 % — HIGH (ref 0.1–0.3)
INR BLD: 3.49 RATIO — HIGH (ref 0.65–1.3)
LYMPHOCYTES # BLD AUTO: 1.09 K/UL — LOW (ref 1.2–3.4)
LYMPHOCYTES # BLD AUTO: 13.3 % — LOW (ref 20.5–51.1)
MCHC RBC-ENTMCNC: 28.5 PG — SIGNIFICANT CHANGE UP (ref 27–31)
MCHC RBC-ENTMCNC: 31.8 G/DL — LOW (ref 32–37)
MCV RBC AUTO: 89.6 FL — SIGNIFICANT CHANGE UP (ref 80–94)
MONOCYTES # BLD AUTO: 0.76 K/UL — HIGH (ref 0.1–0.6)
MONOCYTES NFR BLD AUTO: 9.3 % — SIGNIFICANT CHANGE UP (ref 1.7–9.3)
NEUTROPHILS # BLD AUTO: 6.12 K/UL — SIGNIFICANT CHANGE UP (ref 1.4–6.5)
NEUTROPHILS NFR BLD AUTO: 74.5 % — SIGNIFICANT CHANGE UP (ref 42.2–75.2)
NRBC # BLD: 0 /100 WBCS — SIGNIFICANT CHANGE UP (ref 0–0)
PLATELET # BLD AUTO: 195 K/UL — SIGNIFICANT CHANGE UP (ref 130–400)
POTASSIUM SERPL-MCNC: 4.1 MMOL/L — SIGNIFICANT CHANGE UP (ref 3.5–5)
POTASSIUM SERPL-SCNC: 4.1 MMOL/L — SIGNIFICANT CHANGE UP (ref 3.5–5)
PROTHROM AB SERPL-ACNC: 39.6 SEC — HIGH (ref 9.95–12.87)
RBC # BLD: 3.83 M/UL — LOW (ref 4.7–6.1)
RBC # FLD: 14.6 % — HIGH (ref 11.5–14.5)
SODIUM SERPL-SCNC: 142 MMOL/L — SIGNIFICANT CHANGE UP (ref 135–146)
WBC # BLD: 8.2 K/UL — SIGNIFICANT CHANGE UP (ref 4.8–10.8)
WBC # FLD AUTO: 8.2 K/UL — SIGNIFICANT CHANGE UP (ref 4.8–10.8)

## 2019-02-20 RX ORDER — ACETAMINOPHEN 500 MG
650 TABLET ORAL EVERY 6 HOURS
Qty: 0 | Refills: 0 | Status: DISCONTINUED | OUTPATIENT
Start: 2019-02-20 | End: 2019-02-22

## 2019-02-20 RX ADMIN — AMLODIPINE BESYLATE 5 MILLIGRAM(S): 2.5 TABLET ORAL at 05:13

## 2019-02-20 RX ADMIN — ATORVASTATIN CALCIUM 20 MILLIGRAM(S): 80 TABLET, FILM COATED ORAL at 21:44

## 2019-02-20 RX ADMIN — Medication 10 MILLIGRAM(S): at 05:13

## 2019-02-20 RX ADMIN — Medication 50 MILLIGRAM(S): at 05:13

## 2019-02-20 RX ADMIN — Medication 20 MILLIGRAM(S): at 05:13

## 2019-02-20 RX ADMIN — Medication 10 MILLIGRAM(S): at 17:33

## 2019-02-20 RX ADMIN — Medication 0.12 MILLIGRAM(S): at 05:13

## 2019-02-20 NOTE — CONSULT NOTE ADULT - SUBJECTIVE AND OBJECTIVE BOX
68Y OLD MALE ADMITTED TO MEDICINE YESTERDAY COMPLAINING OF INABILITY TO AMBULATE SINCE FALL LAST WEEK SATURDAY. AS PER SISTER PT AMBULATES WITH CANE SINCE CVA YEARS AGO RESULTING IN RIGHT SIDED WEAKNESS, TRIPPED AND FELL ON RIGHT SIDE SUSTAINING BRUISE TO RIGHT HIP. PT ADMITS TO RIGHT HIP PAIN, WHICH HAS SINCE IMPROVED AND DIFFICULTY TO AMBULATE SINCE THEN, BUT  DENIES CHEST/ABDOMINAL PAIN, SOB, DIZZINESS, LIGHTHEADEDNESS, HEAD TRAUMA, N/V, FEVER/CHILLS OR LOC.    HOME MEDS:        digoxin 125 mcg (0.125 mg) oral tablet: 1 tab(s) orally once a day, Last Dose Taken:    · 	metoprolol succinate 100 mg oral tablet, extended release: 1 tab(s) orally once a day, Last Dose Taken:    · 	amLODIPine 5 mg oral tablet: Last Dose Taken:    · 	enalapril 10 mg oral tablet: orally 2 times a day, Last Dose Taken:    · 	baclofen 10 mg oral tablet: 1 cap(s) orally once a day, As Needed, Last Dose Taken:    · 	warfarin 2.5 mg oral tablet: 1 tab(s) orally once a day (at bedtime), Last Dose Taken:    · 	atorvastatin 20 mg oral tablet: 1 tab(s) orally once a day (at bedtime), Last Dose Taken:    · 	furosemide 20 mg oral tablet: 1 tab(s) orally once a day, Last Dose Taken:      .    Patient History:    Past Medical History:  Afib    HTN (hypertension)    Sciatica    TIA (transient ischemic attack).     Past Surgical History:  No significant past surgical history.      Vital Signs Last 24 Hrs  T(C): 36.8 (2019 13:38), Max: 37.2 (2019 20:33)  T(F): 98.2 (2019 13:38), Max: 98.9 (2019 20:33)  HR: 127 (2019 13:38) (94 - 127)  BP: 136/91 (2019 13:38) (129/97 - 176/91)  BP(mean): --  RR: 18 (2019 05:32) (18 - 18)  SpO2: 98% (2019 20:13) (98% - 98%)    GEN NAD, A&OX3  SKIN +LARGE ECCHYMOTIC AREA EXT FROM LEFT SIDED LOWER BACK TO LEFT GLUTEUS/UPPER THIGH WITH NO TENDERNESS                              10.9   8.20  )-----------( 195      ( 2019 06:15 )             34.3       02-    142  |  105  |  27<H>  ----------------------------<  95  4.1   |  27  |  1.2    Ca    8.2<L>      2019 06:15    TPro  5.9<L>  /  Alb  3.6  /  TBili  1.3<H>  /  DBili  x   /  AST  13  /  ALT  13  /  AlkPhos  100                Urinalysis Basic - ( 2019 13:09 )    Color: Yellow / Appearance: Clear / S.020 / pH: x  Gluc: x / Ketone: 15  / Bili: Negative / Urobili: 2.0 mg/dL   Blood: x / Protein: 100 mg/dL / Nitrite: Negative   Leuk Esterase: Negative / RBC: 1-2 /HPF / WBC 3-5 /HPF   Sq Epi: x / Non Sq Epi: x / Bacteria: x        PT/INR - ( 2019 06:15 )   PT: 39.60 sec;   INR: 3.49 ratio         PTT - ( 2019 12:17 )  PTT:43.9 sec    Lactate Trend      CARDIAC MARKERS ( 2019 10:45 )  x     / <0.01 ng/mL / x     / x     / x        < from: CT Lower Extremity w/ IV Cont, Right (19 @ 14:35) >  mpression:    No acute osseous abnormality.    Right gluteus heather intramuscular 8.4 x 4.9 x 11.9 cm hematoma.          < end of copied text > 68Y OLD MALE ADMITTED TO MEDICINE YESTERDAY COMPLAINING OF INABILITY TO AMBULATE SINCE FALL LAST WEEK SATURDAY. AS PER SISTER PT AMBULATES WITH CANE SINCE CVA YEARS AGO RESULTING IN RIGHT SIDED WEAKNESS, TRIPPED AND FELL ON RIGHT SIDE SUSTAINING BRUISE TO RIGHT HIP. PT ADMITS TO RIGHT HIP PAIN, WHICH HAS SINCE IMPROVED AND DIFFICULTY TO AMBULATE SINCE THEN, BUT  DENIES CHEST/ABDOMINAL PAIN, SOB, DIZZINESS, LIGHTHEADEDNESS, HEAD TRAUMA, N/V, FEVER/CHILLS OR LOC.    HOME MEDS:        digoxin 125 mcg (0.125 mg) oral tablet: 1 tab(s) orally once a day, Last Dose Taken:    · 	metoprolol succinate 100 mg oral tablet, extended release: 1 tab(s) orally once a day, Last Dose Taken:    · 	amLODIPine 5 mg oral tablet: Last Dose Taken:    · 	enalapril 10 mg oral tablet: orally 2 times a day, Last Dose Taken:    · 	baclofen 10 mg oral tablet: 1 cap(s) orally once a day, As Needed, Last Dose Taken:    · 	warfarin 2.5 mg oral tablet: 1 tab(s) orally once a day (at bedtime), Last Dose Taken:    · 	atorvastatin 20 mg oral tablet: 1 tab(s) orally once a day (at bedtime), Last Dose Taken:    · 	furosemide 20 mg oral tablet: 1 tab(s) orally once a day, Last Dose Taken:      .    Patient History:    Past Medical History:  Afib    HTN (hypertension)    Sciatica    TIA (transient ischemic attack).     Past Surgical History:  No significant past surgical history.      Vital Signs Last 24 Hrs  T(C): 36.8 (2019 13:38), Max: 37.2 (2019 20:33)  T(F): 98.2 (2019 13:38), Max: 98.9 (2019 20:33)  HR: 127 (2019 13:38) (94 - 127)  BP: 136/91 (2019 13:38) (129/97 - 176/91)  RR: 18 (2019 05:32) (18 - 18)  SpO2: 98% (2019 20:13) (98% - 98%)    GEN NAD, A&OX3    Large Right sided Inguinal  scrotal hernia containing small bowel, nontender, reducible  SKIN +LARGE ECCHYMOTIC AREA EXT FROM RIGHT SIDED LOWER BACK TO RIGHT GLUTEUS/UPPER THIGH WITH NO TENDERNESS    Weakness and contracture deformity of right upper extremity with weakness of Right lower extremity with chronic stasis changes right lower extremity.                              10.9   8.20  )-----------( 195      ( 2019 06:15 )             34.3       02    142  |  105  |  27<H>  ----------------------------<  95  4.1   |  27  |  1.2    Ca    8.2<L>      2019 06:15    TPro  5.9<L>  /  Alb  3.6  /  TBili  1.3<H>  /  DBili  x   /  AST  13  /  ALT  13  /  AlkPhos  100                Urinalysis Basic - ( 2019 13:09 )    Color: Yellow / Appearance: Clear / S.020 / pH: x  Gluc: x / Ketone: 15  / Bili: Negative / Urobili: 2.0 mg/dL   Blood: x / Protein: 100 mg/dL / Nitrite: Negative   Leuk Esterase: Negative / RBC: 1-2 /HPF / WBC 3-5 /HPF   Sq Epi: x / Non Sq Epi: x / Bacteria: x        PT/INR - ( 2019 06:15 )   PT: 39.60 sec;   INR: 3.49 ratio         PTT - ( 2019 12:17 )  PTT:43.9 sec    Lactate Trend      CARDIAC MARKERS ( 2019 10:45 )  x     / <0.01 ng/mL / x     / x     / x        < from: CT Lower Extremity w/ IV Cont, Right (19 @ 14:35) >  mpression:    No acute osseous abnormality.    Right gluteus heather intramuscular 8.4 x 4.9 x 11.9 cm hematoma.          < end of copied text >

## 2019-02-20 NOTE — PHYSICAL THERAPY INITIAL EVALUATION ADULT - PASSIVE RANGE OF MOTION EXAMINATION, REHAB EVAL
BLE AAROM/PROM WFL grossly throughout, assessed grossly in sitting/no Passive ROM deficits were identified

## 2019-02-20 NOTE — PHYSICAL THERAPY INITIAL EVALUATION ADULT - GAIT DEVIATIONS NOTED, PT EVAL
decreased step length/decreased weight-shifting ability/increased time in double stance/decreased mariama

## 2019-02-20 NOTE — CONSULT NOTE ADULT - ASSESSMENT
pT IS A 67 Y/O MALE WITH LARGE STABLE GLUTEAL HEMATOMA    MONITOR H/H  NO SURGICAL INTERVENTION NECESSARY AT THIS TIME  RECONSULT PRN pT IS A 67 Y/O MALE WITH LARGE STABLE GLUTEAL HEMATOMA week old    MONITOR H/H  NO SURGICAL INTERVENTION NECESSARY AT THIS TIME  RECONSULT PRN

## 2019-02-20 NOTE — CONSULT NOTE ADULT - ASSESSMENT
IMPRESSION: Rehab of 67 y/o  m  rehab  for  GD decline  cva  rt  hp    PRECAUTIONS: [  ] Cardiac  [  ] Respiratory  [  ] Seizures [  ] Contact Isolation  [  ] Droplet Isolation  [ FALL ] Other    Weight Bearing Status:     RECOMMENDATION:    Out of Bed to Chair     DVT/Decubiti Prophylaxis    REHAB PLAN:     [  xx ] Bedside P/T 3-5 times a week   [   ]   Bedside O/T  2-3 times a week             [   ] No Rehab Therapy Indicated                   [   ]  Speech Therapy   Conditioning/ROM                                    ADL  Bed Mobility                                               Conditioning/ROM  Transfers                                                     Bed Mobility  Sitting /Standing Balance                         Transfers                                        Gait Training                                               Sitting/Standing Balance  Stair Training [   ]Applicable                    Home equipment Eval                                                                        Splinting  [   ] Only      GOALS:   ADL   [x   ]   Independent                    Transfers  [  x ] Independent                          Ambulation  [  x ] Independent     [  x  ] With device                            [   ]  CG                                                         [   ]  CG                                                                  [  x ] CG                            [    ] Min A                                                   [   ] Min A                                                              [   ] Min  A          DISCHARGE PLAN:   [   ]  Good candidate for Intensive Rehabilitation/Hospital based-[facility]A [facility]                                             Will tolerate 3hrs Intensive Rehab Daily                                       [   xx ]  Short Term Rehab in Skilled Nursing Facility                                       [    ]  Home with Outpatient or VN services                                         [    ]  Possible Candidate for Intensive Hospital based Rehab

## 2019-02-20 NOTE — CONSULT NOTE ADULT - SUBJECTIVE AND OBJECTIVE BOX
HPI:  68Y OLD MALE PMHX BELOW PRESENTS TO THE ED COMPLAINING OF INABILITY TO AMBULATE SINCE FALL LAST WEEK SATURDAY. AS PER SISTER PT AMBULATES WITH CANE SINCE CVA YEARS AGO RESULTING IN RIGHT SIDED WEAKNESS, TRIPPED AND FELL ON RIGHT SIDE SUSTAINING BRUISE TO RIGHT HIP. PT ADMITS TO RIGHT HIP PAIN AND DIFFICULTY TO AMBULATE SINCE THEN, BUT  DENIES CHEST/ABDOMINAL PAIN, SOB, DIZZINESS, LIGHTHEADEDNESS, HEAD TRAUMA, N/V, FEVER/CHILLS OR LOC.   PTN  REFERRED TO ACUTE  REHAB  FOR  EVAL AND  TX   PAST MEDICAL & SURGICAL HISTORY:  Kidney stone  Afib  HTN (hypertension)  Sciatica  TIA (transient ischemic attack): CVA 12 years ago with right sided weakness, uses four prong cane  No significant past surgical history      Hospital Course:    TODAY'S SUBJECTIVE & REVIEW OF SYMPTOMS:     Constitutional WNL   Cardio WNL   Resp WNL   GI WNL  Heme WNL  Endo WNL  Skin WNL  MSK WNL  Neuro WNL  Cognitive WNL  Psych WNL      MEDICATIONS  (STANDING):  amLODIPine   Tablet 5 milliGRAM(s) Oral daily  atorvastatin 20 milliGRAM(s) Oral at bedtime  digoxin     Tablet 0.125 milliGRAM(s) Oral daily  enalapril 10 milliGRAM(s) Oral two times a day  furosemide    Tablet 20 milliGRAM(s) Oral daily  metoprolol succinate ER 50 milliGRAM(s) Oral daily    MEDICATIONS  (PRN):  acetaminophen   Tablet .. 650 milliGRAM(s) Oral every 6 hours PRN Mild Pain (1 - 3)  baclofen 10 milliGRAM(s) Oral daily PRN Muscle Spasm      FAMILY HISTORY:  No pertinent family history in first degree relatives      Allergies    No Known Allergies    Intolerances        SOCIAL HISTORY:    [  ] Etoh  [  ] Smoking  [  ] Substance abuse     Home Environment:  [  ] Home Alone  [xx  ] Lives with Family  [  ] Home Health Aid    Dwelling:  [  ] Apartment  [x  ] Private House  [  ] Adult Home  [  ] Skilled Nursing Facility      [  ] Short Term  [  ] Long Term  [xx  ] Stairs       Elevator [  ]    FUNCTIONAL STATUS PTA: (Check all that apply)  Ambulation: [ x  ]Independent    [  ] Dependent     [  ] Non-Ambulatory  Assistive Device: [  ] SA Cane  [ xx ]  Q Cane  [  ] Walker  [  ]  Wheelchair  ADL : [ x ] Independent  [  ]  Dependent       Vital Signs Last 24 Hrs  T(C): 36.4 (2019 05:32), Max: 37.4 (2019 13:59)  T(F): 97.6 (2019 05:32), Max: 99.4 (2019 13:59)  HR: 95 (2019 05:32) (80 - 100)  BP: 129/97 (2019 05:32) (129/97 - 176/91)  BP(mean): --  RR: 18 (2019 05:32) (17 - 19)  SpO2: 98% (2019 20:13) (98% - 99%)      PHYSICAL EXAM: Alert & Oriented X3  GENERAL: NAD, well-groomed, well-developed  HEAD:  Atraumatic, Normocephalic  EYES: EOMI, PERRLA, conjunctiva and sclera clear  NECK: Supple, No JVD, Normal thyroid  CHEST/LUNG: Clear to percussion bilaterally; No rales, rhonchi, wheezing, or rubs  HEART: Regular rate and rhythm; No murmurs, rubs, or gallops  ABDOMEN: Soft, Nontender, Nondistended; Bowel sounds present  EXTREMITIES:  2+ Peripheral Pulses, No clubbing, cyanosis, or edema    NERVOUS SYSTEM:  Cranial Nerves 2-12 intact [x  ] Abnormal  [  ]  ROM: WFL all extremities [  ]  Abnormal [ x ]  Motor Strength: WFL all extremities  [  ]  Abnormal [ x ]  Sensation: intact to light touch [  ] Abnormal [ x ]  Reflexes: Symmetric [  ]  Abnormal [x ]    FUNCTIONAL STATUS:  Bed Mobility: Independent [  ]  Supervision [  ]  Needs Assistance [x  ]  N/A [  ]  Transfers: Independent [  ]  Supervision [  ]  Needs Assistance [ x ]  N/A [  ]   Ambulation: Independent [  ]  Supervision [  ]  Needs Assistance [x ]  N/A [  ]  ADL: Independent [  ] Requires Assistance [ x ] N/A [  ]  SEE PT/OT IE NOTES    LABS:                        10.9   8.20  )-----------( 195      ( 2019 06:15 )             34.3     02-    146  |  108  |  25<H>  ----------------------------<  114<H>  4.2   |  31  |  1.3    Ca    8.6      2019 10:45    TPro  5.9<L>  /  Alb  3.6  /  TBili  1.3<H>  /  DBili  x   /  AST  13  /  ALT  13  /  AlkPhos  100  -    PT/INR - ( 2019 12:17 )   PT: 39.90 sec;   INR: 3.51 ratio         PTT - ( 2019 12:17 )  PTT:43.9 sec  Urinalysis Basic - ( 2019 13:09 )    C< from: Xray Femur 2 Views, Right (19 @ 10:59) >    There is no evidence of fracture or dislocation of the right femur. Mild   degenerative changes of the hip and knee joints.      < end of copied text >  < from: Xray Femur 2 Views, Right (19 @ 10:59) >    There is no evidence of fracture or dislocation of the right femur. Mild   degenerative changes of the hip and knee joints.      < end of copied text >  olor: Yellow / Appearance: Clear / S.020 / pH: x  Gluc: x / Ketone: 15  / Bili: Negative / Urobili: 2.0 mg/dL   Blood: x / Protein: 100 mg/dL / Nitrite: Negative   Leuk Esterase: Negative / RBC: 1-2 /HPF / WBC 3-5 /HPF   Sq Epi: x / Non Sq Epi: x / Bacteria: x        RADIOLOGY & ADDITIONAL STUDIES:    Assesment:

## 2019-02-20 NOTE — PHYSICAL THERAPY INITIAL EVALUATION ADULT - IMPAIRMENTS FOUND, PT EVAL
aerobic capacity/endurance/posture/ergonomics and body mechanics/gait, locomotion, and balance/muscle strength

## 2019-02-20 NOTE — PROGRESS NOTE ADULT - SUBJECTIVE AND OBJECTIVE BOX
DICKJOHNULICESDEBIMARCELO  68y  Male      Patient is a 68y old  Male who presents with a chief complaint of FALL (19 Feb 2019 17:04)    unable to walk    REVIEW OF SYSTEMS:  CONSTITUTIONAL: No fever, weight loss, or fatigue  EYES: No eye pain, visual disturbances, or discharge  ENMT:  No difficulty hearing, tinnitus, vertigo; No sinus or throat pain  NECK: No pain or stiffness  BREASTS: No pain, masses, or nipple discharge  RESPIRATORY: No cough, wheezing, chills or hemoptysis; No shortness of breath  CARDIOVASCULAR: No chest pain, palpitations, dizziness, or leg swelling  GASTROINTESTINAL: No abdominal or epigastric pain. No nausea, vomiting, or hematemesis; No diarrhea or constipation. No melena or hematochezia.  GENITOURINARY: No dysuria, frequency, hematuria, or incontinence  NEUROLOGICAL: No headaches, memory loss, rt sided weakness  SKIN: No itching, burning, rashes, or lesions   LYMPH NODES: No enlarged glands  ENDOCRINE: No heat or cold intolerance; No hair loss  MUSCULOSKELETAL: No joint pain or swelling; No muscle, back, or extremity pain  PSYCHIATRIC: No depression, anxiety, mood swings, or difficulty sleeping  HEME/LYMPH: No easy bruising, or bleeding gums  ALLERY AND IMMUNOLOGIC: No hives or eczema  FAMILY HISTORY:  No pertinent family history in first degree relatives    T(C): 36.4 (02-20-19 @ 05:32), Max: 37.4 (02-19-19 @ 13:59)  HR: 95 (02-20-19 @ 05:32) (80 - 100)  BP: 129/97 (02-20-19 @ 05:32) (129/97 - 176/91)  RR: 18 (02-20-19 @ 05:32) (17 - 19)  SpO2: 98% (02-19-19 @ 20:13) (98% - 99%)  Wt(kg): --Vital Signs Last 24 Hrs  T(C): 36.4 (20 Feb 2019 05:32), Max: 37.4 (19 Feb 2019 13:59)  T(F): 97.6 (20 Feb 2019 05:32), Max: 99.4 (19 Feb 2019 13:59)  HR: 95 (20 Feb 2019 05:32) (80 - 100)  BP: 129/97 (20 Feb 2019 05:32) (129/97 - 176/91)  BP(mean): --  RR: 18 (20 Feb 2019 05:32) (17 - 19)  SpO2: 98% (19 Feb 2019 20:13) (98% - 99%)  No Known Allergies      PHYSICAL EXAM:  GENERAL: NAD,   HEAD:  Atraumatic, Normocephalic  EYES: EOMI, PERRLA, conjunctiva and sclera clear  ENMT: No tonsillar erythema, exudates, or enlargement;   NECK: Supple, No JVD, Normal thyroid  NERVOUS SYSTEM:  Alert & Oriented X3, rt sided weakness+  CHEST/LUNG: Clear to percussion bilaterally; No rales, rhonchi, wheezing, or rubs  HEART: Regular rate and rhythm; No murmurs, rubs, or gallops  ABDOMEN: Soft, Nontender, Nondistended; Bowel sounds present  EXTREMITIES: both legs stasis dermatitis+  LYMPH: No lymphadenopathy noted  SKIN: No rashes or lesions      LABS:  02-19    146  |  108  |  25<H>  ----------------------------<  114<H>  4.2   |  31  |  1.3    Ca    8.6      19 Feb 2019 10:45    TPro  5.9<L>  /  Alb  3.6  /  TBili  1.3<H>  /  DBili  x   /  AST  13  /  ALT  13  /  AlkPhos  100  02-19                          12.2   9.90  )-----------( 212      ( 19 Feb 2019 10:45 )             38.0   PT/INR - ( 19 Feb 2019 12:17 )   PT: 39.90 sec;   INR: 3.51 ratio         PTT - ( 19 Feb 2019 12:17 )  PTT:43.9 sec  < from: CT Lower Extremity w/ IV Cont, Right (02.19.19 @ 14:35) >  No acute osseous abnormality.    Right gluteus heather intramuscular 8.4 x 4.9 x 11.9 cm hematoma.    < end of copied text >      RADIOLOGY & ADDITIONAL TESTS:    MEDICATION:  acetaminophen   Tablet .. 650 milliGRAM(s) Oral every 6 hours PRN  amLODIPine   Tablet 5 milliGRAM(s) Oral daily  atorvastatin 20 milliGRAM(s) Oral at bedtime  baclofen 10 milliGRAM(s) Oral daily PRN  digoxin     Tablet 0.125 milliGRAM(s) Oral daily  enalapril 10 milliGRAM(s) Oral two times a day  furosemide    Tablet 20 milliGRAM(s) Oral daily  metoprolol succinate ER 50 milliGRAM(s) Oral daily      HEALTH ISSUES - PROBLEM Dx:    s/p fall ,rt gluteus muscle hematoma surgery follow up  CVA with Rt hemiplegia on coumadin on hold  coumadin toxicity on hold  HTN on amlodipine  Lymphedema on lasix

## 2019-02-20 NOTE — PHYSICAL THERAPY INITIAL EVALUATION ADULT - GENERAL OBSERVATIONS, REHAB EVAL
8:25 - 8:48.  Chart reviewed. Patient available to be seen for physical therapy.  Patient encountered semi-reclined in bed.  Patient reports R hip/glute pain s/p fall. Agreeable for PT evaluation.

## 2019-02-20 NOTE — CONSULT NOTE ADULT - ATTENDING COMMENTS
Patient seen and examined with surgery PA on rounds and discussed management plans with patient. Patient lives with sister with minimal ambulation with cane. Patient requires active rehab to bring him to pre fall state. Right Inguina hernia is non obstructed and easily reducible present for many years as per patient

## 2019-02-21 LAB
CK SERPL-CCNC: 148 U/L — SIGNIFICANT CHANGE UP (ref 0–225)
HCT VFR BLD CALC: 34.1 % — LOW (ref 42–52)
HGB BLD-MCNC: 10.9 G/DL — LOW (ref 14–18)
INR BLD: 1.99 RATIO — HIGH (ref 0.65–1.3)
MCHC RBC-ENTMCNC: 28.2 PG — SIGNIFICANT CHANGE UP (ref 27–31)
MCHC RBC-ENTMCNC: 32 G/DL — SIGNIFICANT CHANGE UP (ref 32–37)
MCV RBC AUTO: 88.3 FL — SIGNIFICANT CHANGE UP (ref 80–94)
NRBC # BLD: 0 /100 WBCS — SIGNIFICANT CHANGE UP (ref 0–0)
PLATELET # BLD AUTO: 221 K/UL — SIGNIFICANT CHANGE UP (ref 130–400)
PROTHROM AB SERPL-ACNC: 22.7 SEC — HIGH (ref 9.95–12.87)
RBC # BLD: 3.86 M/UL — LOW (ref 4.7–6.1)
RBC # FLD: 14.4 % — SIGNIFICANT CHANGE UP (ref 11.5–14.5)
WBC # BLD: 9.18 K/UL — SIGNIFICANT CHANGE UP (ref 4.8–10.8)
WBC # FLD AUTO: 9.18 K/UL — SIGNIFICANT CHANGE UP (ref 4.8–10.8)

## 2019-02-21 PROCEDURE — 99222 1ST HOSP IP/OBS MODERATE 55: CPT

## 2019-02-21 RX ORDER — WARFARIN SODIUM 2.5 MG/1
2.5 TABLET ORAL ONCE
Qty: 0 | Refills: 0 | Status: COMPLETED | OUTPATIENT
Start: 2019-02-21 | End: 2019-02-21

## 2019-02-21 RX ADMIN — Medication 650 MILLIGRAM(S): at 23:08

## 2019-02-21 RX ADMIN — Medication 0.12 MILLIGRAM(S): at 06:13

## 2019-02-21 RX ADMIN — ATORVASTATIN CALCIUM 20 MILLIGRAM(S): 80 TABLET, FILM COATED ORAL at 21:05

## 2019-02-21 RX ADMIN — WARFARIN SODIUM 2.5 MILLIGRAM(S): 2.5 TABLET ORAL at 23:23

## 2019-02-21 RX ADMIN — Medication 20 MILLIGRAM(S): at 06:13

## 2019-02-21 RX ADMIN — Medication 10 MILLIGRAM(S): at 17:52

## 2019-02-21 RX ADMIN — Medication 650 MILLIGRAM(S): at 21:05

## 2019-02-21 RX ADMIN — Medication 50 MILLIGRAM(S): at 06:13

## 2019-02-21 RX ADMIN — Medication 10 MILLIGRAM(S): at 06:13

## 2019-02-21 RX ADMIN — AMLODIPINE BESYLATE 5 MILLIGRAM(S): 2.5 TABLET ORAL at 06:13

## 2019-02-21 NOTE — PROGRESS NOTE ADULT - SUBJECTIVE AND OBJECTIVE BOX
SERJIO MARCELO  68y  Male      Patient is a 68y old  Male who presents with a chief complaint of FALL (20 Feb 2019 17:42)    RT Gluteus muscle hematoma    REVIEW OF SYSTEMS:  CONSTITUTIONAL: No fever, weight loss, or fatigue  EYES: No eye pain, visual disturbances, or discharge  ENMT:  No difficulty hearing, tinnitus, vertigo; No sinus or throat pain  NECK: No pain or stiffness  BREASTS: No pain, masses, or nipple discharge  RESPIRATORY: No cough, wheezing, chills or hemoptysis; No shortness of breath  CARDIOVASCULAR: No chest pain, palpitations, dizziness, or leg swelling  GASTROINTESTINAL: No abdominal or epigastric pain. No nausea, vomiting, or hematemesis; No diarrhea or constipation. No melena or hematochezia.  GENITOURINARY: No dysuria, frequency, hematuria, or incontinence  NEUROLOGICAL: No headaches, memory loss, loss of strength, numbness, or tremors  SKIN: No itching, burning, rashes, or lesions   LYMPH NODES: No enlarged glands  ENDOCRINE: No heat or cold intolerance; No hair loss  MUSCULOSKELETAL: No joint pain or swelling; No muscle, back, rt gluteus muscle hematoma  PSYCHIATRIC: No depression, anxiety, mood swings, or difficulty sleeping  HEME/LYMPH: No easy bruising, or bleeding gums  ALLERY AND IMMUNOLOGIC: No hives or eczema  FAMILY HISTORY:  No pertinent family history in first degree relatives    T(C): 37.1 (02-20-19 @ 21:29), Max: 37.1 (02-20-19 @ 21:29)  HR: 106 (02-20-19 @ 21:29) (106 - 127)  BP: 154/86 (02-20-19 @ 21:29) (136/91 - 154/86)  RR: --  SpO2: --  Wt(kg): --Vital Signs Last 24 Hrs  T(C): 37.1 (20 Feb 2019 21:29), Max: 37.1 (20 Feb 2019 21:29)  T(F): 98.7 (20 Feb 2019 21:29), Max: 98.7 (20 Feb 2019 21:29)  HR: 106 (20 Feb 2019 21:29) (106 - 127)  BP: 154/86 (20 Feb 2019 21:29) (136/91 - 154/86)  BP(mean): --  RR: --  SpO2: --  No Known Allergies      PHYSICAL EXAM:  GENERAL: NAD,   HEAD:  Atraumatic, Normocephalic  EYES: EOMI, PERRLA, conjunctiva and sclera clear  ENMT: No tonsillar erythema, exudates, or enlargement;   NECK: Supple, No JVD, Normal thyroid  NERVOUS SYSTEM:  Alert & Oriented X3,   CHEST/LUNG: Clear to percussion bilaterally; No rales, rhonchi, wheezing, or rubs  HEART: Regular rate and rhythm; No murmurs, rubs, or gallops  ABDOMEN: Soft, Nontender, Nondistended; Bowel sounds present  EXTREMITIES:  2+ Peripheral Pulses, No clubbing, cyanosis, rt buttock brusie+ ecchymosis+  LYMPH: No lymphadenopathy noted  SKIN: No rashes or lesions      LABS:  02-20    142  |  105  |  27<H>  ----------------------------<  95  4.1   |  27  |  1.2    Ca    8.2<L>      20 Feb 2019 06:15    TPro  5.9<L>  /  Alb  3.6  /  TBili  1.3<H>  /  DBili  x   /  AST  13  /  ALT  13  /  AlkPhos  100  02-19                            10.9   8.20  )-----------( 195      ( 20 Feb 2019 06:15 )             34.3   PT/INR - ( 20 Feb 2019 06:15 )   PT: 39.60 sec;   INR: 3.49 ratio         PTT - ( 19 Feb 2019 12:17 )  PTT:43.9 sec    RADIOLOGY & ADDITIONAL TESTS:    MEDICATION:  acetaminophen   Tablet .. 650 milliGRAM(s) Oral every 6 hours PRN  amLODIPine   Tablet 5 milliGRAM(s) Oral daily  atorvastatin 20 milliGRAM(s) Oral at bedtime  baclofen 10 milliGRAM(s) Oral daily PRN  digoxin     Tablet 0.125 milliGRAM(s) Oral daily  enalapril 10 milliGRAM(s) Oral two times a day  furosemide    Tablet 20 milliGRAM(s) Oral daily  metoprolol succinate ER 50 milliGRAM(s) Oral daily      HEALTH ISSUES - PROBLEM Dx:  Hematoma and contusion: Hematoma and contusion rt gluteus muscle hematoma coumadin on hold  hx CVA with rt hemiplegia pt evaluation  HTN on amolodipine  stasis dermatitis on lasix  cahd on digoxin

## 2019-02-22 VITALS
DIASTOLIC BLOOD PRESSURE: 86 MMHG | RESPIRATION RATE: 17 BRPM | SYSTOLIC BLOOD PRESSURE: 140 MMHG | TEMPERATURE: 98 F | HEART RATE: 99 BPM

## 2019-02-22 LAB
HCT VFR BLD CALC: 32.8 % — LOW (ref 42–52)
HGB BLD-MCNC: 10.5 G/DL — LOW (ref 14–18)
INR BLD: 1.75 RATIO — HIGH (ref 0.65–1.3)
MCHC RBC-ENTMCNC: 28.3 PG — SIGNIFICANT CHANGE UP (ref 27–31)
MCHC RBC-ENTMCNC: 32 G/DL — SIGNIFICANT CHANGE UP (ref 32–37)
MCV RBC AUTO: 88.4 FL — SIGNIFICANT CHANGE UP (ref 80–94)
NRBC # BLD: 0 /100 WBCS — SIGNIFICANT CHANGE UP (ref 0–0)
PLATELET # BLD AUTO: 209 K/UL — SIGNIFICANT CHANGE UP (ref 130–400)
PROTHROM AB SERPL-ACNC: 20 SEC — HIGH (ref 9.95–12.87)
RBC # BLD: 3.71 M/UL — LOW (ref 4.7–6.1)
RBC # FLD: 14.5 % — SIGNIFICANT CHANGE UP (ref 11.5–14.5)
WBC # BLD: 8 K/UL — SIGNIFICANT CHANGE UP (ref 4.8–10.8)
WBC # FLD AUTO: 8 K/UL — SIGNIFICANT CHANGE UP (ref 4.8–10.8)

## 2019-02-22 RX ADMIN — AMLODIPINE BESYLATE 5 MILLIGRAM(S): 2.5 TABLET ORAL at 06:02

## 2019-02-22 RX ADMIN — Medication 0.12 MILLIGRAM(S): at 06:02

## 2019-02-22 RX ADMIN — Medication 20 MILLIGRAM(S): at 06:02

## 2019-02-22 RX ADMIN — Medication 10 MILLIGRAM(S): at 06:02

## 2019-02-22 RX ADMIN — Medication 50 MILLIGRAM(S): at 06:02

## 2019-02-22 NOTE — PROGRESS NOTE ADULT - SUBJECTIVE AND OBJECTIVE BOX
SERJIO MARCELO  68y  Male      Patient is a 68y old  Male who presents with a chief complaint of FALL (21 Feb 2019 08:07)    rt gluteus muscle hematoma off coumadin    REVIEW OF SYSTEMS:  CONSTITUTIONAL: No fever, weight loss, or fatigue  EYES: No eye pain, visual disturbances, or discharge  ENMT:  No difficulty hearing, tinnitus, vertigo; No sinus or throat pain  NECK: No pain or stiffness  BREASTS: No pain, masses, or nipple discharge  RESPIRATORY: No cough, wheezing, chills or hemoptysis; No shortness of breath  CARDIOVASCULAR: No chest pain, palpitations, dizziness, or leg swelling  GASTROINTESTINAL: No abdominal or epigastric pain. No nausea, vomiting, or hematemesis; No diarrhea or constipation. No melena or hematochezia.  GENITOURINARY: No dysuria, frequency, hematuria, or incontinence  NEUROLOGICAL: No headaches, memory loss, loss of strength, numbness, or tremors,rt hemiplegia  SKIN: No itching, burning, rashes, or lesions   LYMPH NODES: No enlarged glands  ENDOCRINE: No heat or cold intolerance; No hair loss  MUSCULOSKELETAL: No joint pain or swelling; No muscle, back, or extremity pain,rt gluteus muscle hematoma  PSYCHIATRIC: No depression, anxiety, mood swings, or difficulty sleeping  HEME/LYMPH: No easy bruising, or bleeding gums  ALLERY AND IMMUNOLOGIC: No hives or eczema  FAMILY HISTORY:  No pertinent family history in first degree relatives    T(C): 36.5 (02-22-19 @ 05:00), Max: 36.9 (02-21-19 @ 13:33)  HR: 99 (02-22-19 @ 05:00) (89 - 112)  BP: 140/86 (02-22-19 @ 05:00) (137/77 - 140/86)  RR: 17 (02-22-19 @ 05:00) (16 - 17)  SpO2: --  Wt(kg): --Vital Signs Last 24 Hrs  T(C): 36.5 (22 Feb 2019 05:00), Max: 36.9 (21 Feb 2019 13:33)  T(F): 97.7 (22 Feb 2019 05:00), Max: 98.5 (21 Feb 2019 13:33)  HR: 99 (22 Feb 2019 05:00) (89 - 112)  BP: 140/86 (22 Feb 2019 05:00) (137/77 - 140/86)  BP(mean): --  RR: 17 (22 Feb 2019 05:00) (16 - 17)  SpO2: --  No Known Allergies      PHYSICAL EXAM:  GENERAL: NAD,   HEAD:  Atraumatic, Normocephalic  EYES: EOMI, PERRLA, conjunctiva and sclera clear  ENMT: No tonsillar erythema, exudates, or enlargement; Moist mucous membranes, Good dentition, No lesions  NECK: Supple, No JVD, Normal thyroid  NERVOUS SYSTEM:  Alert & Oriented X3, Good concentration;  CHEST/LUNG: Clear to percussion bilaterally; No rales, rhonchi, wheezing, or rubs  HEART: Regular rate and rhythm; No murmurs, rubs, or gallops  ABDOMEN: Soft, Nontender, Nondistended; Bowel sounds present  EXTREMITIES:  2+ Peripheral Pulses, No clubbing, cyanosis, or edema  LYMPH: No lymphadenopathy noted  SKIN: No rashes or lesions      LABS:                            10.9   9.18  )-----------( 221      ( 21 Feb 2019 08:52 )             34.1           RADIOLOGY & ADDITIONAL TESTS:  PT/INR - ( 21 Feb 2019 08:52 )   PT: 22.70 sec;   INR: 1.99 ratio           MEDICATION:  acetaminophen   Tablet .. 650 milliGRAM(s) Oral every 6 hours PRN  amLODIPine   Tablet 5 milliGRAM(s) Oral daily  atorvastatin 20 milliGRAM(s) Oral at bedtime  baclofen 10 milliGRAM(s) Oral daily PRN  digoxin     Tablet 0.125 milliGRAM(s) Oral daily  enalapril 10 milliGRAM(s) Oral two times a day  furosemide    Tablet 20 milliGRAM(s) Oral daily  metoprolol succinate ER 50 milliGRAM(s) Oral daily      HEALTH ISSUES - PROBLEM Dx:  Hematoma and contusion: Hematoma and contusion  Rt Gluteus muscle hematoma ,coumadin on hold  CVA with rt hemiplegia need pt/ot  HTN on amlodipine  CAHD on dig  d/c to subacute at [facility] today

## 2019-02-22 NOTE — DISCHARGE NOTE ADULT - NS AS DC FOLLOWUP STROKE INST
Stroke (includes: TIA/SAH/ICH/Ischemic Stroke) Stroke (includes: TIA/SAH/ICH/Ischemic Stroke)/Coumadin/Warfarin Coumadin/Warfarin Smoking Cessation/Coumadin/Warfarin

## 2019-02-22 NOTE — DISCHARGE NOTE ADULT - CARE PLAN
Principal Discharge DX:	Fall, initial encounter  Goal:	regain mobility  Assessment and plan of treatment:	Admit to SNF for Rehab  Secondary Diagnosis:	Chronic atrial fibrillation  Assessment and plan of treatment:	On Warfarin, follow INR

## 2019-02-22 NOTE — DISCHARGE NOTE ADULT - HOSPITAL COURSE
HEALTH ISSUES - PROBLEM Dx:  Hematoma and contusion: Hematoma and contusion    Rt Gluteus muscle hematoma coumadin was on hold  CAHD , a fib on coumadin  CVA with rt hemiplegia, need pt/ot

## 2019-02-22 NOTE — DISCHARGE NOTE ADULT - CARE PROVIDER_API CALL
Carrie Jimenes)  [address] [address]  Phone: [phone]  Fax: [phone]  Follow Up Time:

## 2019-02-22 NOTE — DISCHARGE NOTE ADULT - NS AS DC STROKE ED MATERIALS
Need for Followup After Discharge/Call 911 for Stroke/Stroke Education Booklet/Stroke Warning Signs and Symptoms/Risk Factors for Stroke/Prescribed Medications

## 2019-02-22 NOTE — DISCHARGE NOTE ADULT - MEDICATION SUMMARY - MEDICATIONS TO TAKE
I will START or STAY ON the medications listed below when I get home from the hospital:    enalapril 10 mg oral tablet  -- orally 2 times a day  -- Indication: For HTN (hypertension)    digoxin 125 mcg (0.125 mg) oral tablet  -- 1 tab(s) by mouth once a day  -- Indication: For HR control    warfarin 2.5 mg oral tablet  -- 1 tab(s) by mouth once a day (at bedtime)  -- Indication: For Afib    atorvastatin 20 mg oral tablet  -- 1 tab(s) by mouth once a day (at bedtime)  -- Indication: For Hld    metoprolol succinate 100 mg oral tablet, extended release  -- 1 tab(s) by mouth once a day, pt takes a total of 150 mg  -- Indication: For HTN (hypertension)    amLODIPine 5 mg oral tablet  -- orally once a day  -- Indication: For HTN (hypertension)    furosemide 20 mg oral tablet  -- 1 tab(s) by mouth once a day  -- Indication: For HTN (hypertension)    baclofen 10 mg oral tablet  -- 1 cap(s) by mouth once a day, As Needed  -- Indication: For Musclen relaxant

## 2019-02-22 NOTE — DISCHARGE NOTE ADULT - PATIENT PORTAL LINK FT
You can access the [facility] Patient Portal, offered by [facility], by registering with the following website: [email or web address]

## 2019-02-25 ENCOUNTER — OUTPATIENT (OUTPATIENT)
Dept: OUTPATIENT SERVICES | Facility: HOSPITAL | Age: 69
LOS: 1 days | Discharge: HOME | End: 2019-02-25

## 2019-02-25 DIAGNOSIS — I25.10 ATHEROSCLEROTIC HEART DISEASE OF NATIVE CORONARY ARTERY WITHOUT ANGINA PECTORIS: ICD-10-CM

## 2019-02-25 DIAGNOSIS — Z02.9 ENCOUNTER FOR ADMINISTRATIVE EXAMINATIONS, UNSPECIFIED: ICD-10-CM

## 2019-02-25 PROBLEM — M54.30 SCIATICA, UNSPECIFIED SIDE: Chronic | Status: ACTIVE | Noted: 2019-02-19

## 2019-02-25 PROBLEM — I48.91 UNSPECIFIED ATRIAL FIBRILLATION: Chronic | Status: ACTIVE | Noted: 2019-02-19

## 2019-02-25 PROBLEM — I10 ESSENTIAL (PRIMARY) HYPERTENSION: Chronic | Status: ACTIVE | Noted: 2019-02-19
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
## 2019-02-26 DIAGNOSIS — I87.2 VENOUS INSUFFICIENCY (CHRONIC) (PERIPHERAL): ICD-10-CM

## 2019-02-26 DIAGNOSIS — I48.2 CHRONIC ATRIAL FIBRILLATION: ICD-10-CM

## 2019-02-26 DIAGNOSIS — K40.90 UNILATERAL INGUINAL HERNIA, WITHOUT OBSTRUCTION OR GANGRENE, NOT SPECIFIED AS RECURRENT: ICD-10-CM

## 2019-02-26 DIAGNOSIS — Z79.01 LONG TERM (CURRENT) USE OF ANTICOAGULANTS: ICD-10-CM
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
## 2019-02-26 DIAGNOSIS — I10 ESSENTIAL (PRIMARY) HYPERTENSION: ICD-10-CM
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
## 2019-02-26 DIAGNOSIS — R26.2 DIFFICULTY IN WALKING, NOT ELSEWHERE CLASSIFIED: ICD-10-CM

## 2019-02-26 DIAGNOSIS — I89.0 LYMPHEDEMA, NOT ELSEWHERE CLASSIFIED: ICD-10-CM

## 2019-02-26 DIAGNOSIS — I69.351 HEMIPLEGIA AND HEMIPARESIS FOLLOWING CEREBRAL INFARCTION AFFECTING RIGHT DOMINANT SIDE: ICD-10-CM

## 2019-02-26 DIAGNOSIS — S30.0XXA CONTUSION OF LOWER BACK AND PELVIS, INITIAL ENCOUNTER: ICD-10-CM

## 2019-03-21 ENCOUNTER — OUTPATIENT (OUTPATIENT)
Dept: OUTPATIENT SERVICES | Facility: HOSPITAL | Age: 69
LOS: 1 days | Discharge: HOME | End: 2019-03-21

## 2019-03-21 DIAGNOSIS — I50.9 HEART FAILURE, UNSPECIFIED: ICD-10-CM

## 2019-03-26 ENCOUNTER — OUTPATIENT (OUTPATIENT)
Dept: OUTPATIENT SERVICES | Facility: HOSPITAL | Age: 69
LOS: 1 days | Discharge: HOME | End: 2019-03-26

## 2019-03-26 DIAGNOSIS — I48.91 UNSPECIFIED ATRIAL FIBRILLATION: ICD-10-CM

## 2019-04-02 ENCOUNTER — OUTPATIENT (OUTPATIENT)
Dept: OUTPATIENT SERVICES | Facility: HOSPITAL | Age: 69
LOS: 1 days | Discharge: HOME | End: 2019-04-02

## 2019-04-02 DIAGNOSIS — I48.91 UNSPECIFIED ATRIAL FIBRILLATION: ICD-10-CM

## 2019-04-09 ENCOUNTER — OUTPATIENT (OUTPATIENT)
Dept: OUTPATIENT SERVICES | Facility: HOSPITAL | Age: 69
LOS: 1 days | Discharge: HOME | End: 2019-04-09

## 2019-04-10 DIAGNOSIS — I48.91 UNSPECIFIED ATRIAL FIBRILLATION: ICD-10-CM

## 2019-04-12 ENCOUNTER — OUTPATIENT (OUTPATIENT)
Dept: OUTPATIENT SERVICES | Facility: HOSPITAL | Age: 69
LOS: 1 days | Discharge: HOME | End: 2019-04-12

## 2019-04-12 DIAGNOSIS — I48.91 UNSPECIFIED ATRIAL FIBRILLATION: ICD-10-CM

## 2019-04-16 ENCOUNTER — OUTPATIENT (OUTPATIENT)
Dept: OUTPATIENT SERVICES | Facility: HOSPITAL | Age: 69
LOS: 1 days | Discharge: HOME | End: 2019-04-16

## 2019-04-16 DIAGNOSIS — I48.91 UNSPECIFIED ATRIAL FIBRILLATION: ICD-10-CM

## 2019-04-30 ENCOUNTER — OUTPATIENT (OUTPATIENT)
Dept: OUTPATIENT SERVICES | Facility: HOSPITAL | Age: 69
LOS: 1 days | Discharge: HOME | End: 2019-04-30

## 2019-04-30 DIAGNOSIS — I48.1 PERSISTENT ATRIAL FIBRILLATION: ICD-10-CM

## 2019-05-13 ENCOUNTER — OUTPATIENT (OUTPATIENT)
Dept: OUTPATIENT SERVICES | Facility: HOSPITAL | Age: 69
LOS: 1 days | Discharge: HOME | End: 2019-05-13

## 2019-05-13 DIAGNOSIS — I48.1 PERSISTENT ATRIAL FIBRILLATION: ICD-10-CM

## 2019-05-28 ENCOUNTER — OUTPATIENT (OUTPATIENT)
Dept: OUTPATIENT SERVICES | Facility: HOSPITAL | Age: 69
LOS: 1 days | Discharge: HOME | End: 2019-05-28

## 2019-05-28 DIAGNOSIS — I48.1 PERSISTENT ATRIAL FIBRILLATION: ICD-10-CM

## 2019-06-10 ENCOUNTER — OUTPATIENT (OUTPATIENT)
Dept: OUTPATIENT SERVICES | Facility: HOSPITAL | Age: 69
LOS: 1 days | Discharge: HOME | End: 2019-06-10

## 2019-06-10 DIAGNOSIS — I48.91 UNSPECIFIED ATRIAL FIBRILLATION: ICD-10-CM

## 2019-06-24 ENCOUNTER — OUTPATIENT (OUTPATIENT)
Dept: OUTPATIENT SERVICES | Facility: HOSPITAL | Age: 69
LOS: 1 days | Discharge: HOME | End: 2019-06-24

## 2019-06-24 DIAGNOSIS — I48.91 UNSPECIFIED ATRIAL FIBRILLATION: ICD-10-CM

## 2019-07-08 ENCOUNTER — OUTPATIENT (OUTPATIENT)
Dept: OUTPATIENT SERVICES | Facility: HOSPITAL | Age: 69
LOS: 1 days | Discharge: HOME | End: 2019-07-08

## 2019-07-08 DIAGNOSIS — I48.91 UNSPECIFIED ATRIAL FIBRILLATION: ICD-10-CM

## 2019-07-22 ENCOUNTER — OUTPATIENT (OUTPATIENT)
Dept: OUTPATIENT SERVICES | Facility: HOSPITAL | Age: 69
LOS: 1 days | Discharge: HOME | End: 2019-07-22

## 2019-07-22 DIAGNOSIS — I48.91 UNSPECIFIED ATRIAL FIBRILLATION: ICD-10-CM

## 2019-08-05 ENCOUNTER — OUTPATIENT (OUTPATIENT)
Dept: OUTPATIENT SERVICES | Facility: HOSPITAL | Age: 69
LOS: 1 days | Discharge: HOME | End: 2019-08-05

## 2019-08-05 DIAGNOSIS — I48.91 UNSPECIFIED ATRIAL FIBRILLATION: ICD-10-CM

## 2019-08-19 ENCOUNTER — OUTPATIENT (OUTPATIENT)
Dept: OUTPATIENT SERVICES | Facility: HOSPITAL | Age: 69
LOS: 1 days | Discharge: HOME | End: 2019-08-19

## 2019-08-19 DIAGNOSIS — I48.91 UNSPECIFIED ATRIAL FIBRILLATION: ICD-10-CM

## 2019-09-03 ENCOUNTER — OUTPATIENT (OUTPATIENT)
Dept: OUTPATIENT SERVICES | Facility: HOSPITAL | Age: 69
LOS: 1 days | Discharge: HOME | End: 2019-09-03

## 2019-09-03 DIAGNOSIS — I48.91 UNSPECIFIED ATRIAL FIBRILLATION: ICD-10-CM

## 2019-09-16 ENCOUNTER — OUTPATIENT (OUTPATIENT)
Dept: OUTPATIENT SERVICES | Facility: HOSPITAL | Age: 69
LOS: 1 days | Discharge: HOME | End: 2019-09-16

## 2019-09-16 DIAGNOSIS — I48.91 UNSPECIFIED ATRIAL FIBRILLATION: ICD-10-CM

## 2019-09-30 ENCOUNTER — OUTPATIENT (OUTPATIENT)
Dept: OUTPATIENT SERVICES | Facility: HOSPITAL | Age: 69
LOS: 1 days | Discharge: HOME | End: 2019-09-30

## 2019-09-30 DIAGNOSIS — I48.91 UNSPECIFIED ATRIAL FIBRILLATION: ICD-10-CM

## 2019-10-14 ENCOUNTER — OUTPATIENT (OUTPATIENT)
Dept: OUTPATIENT SERVICES | Facility: HOSPITAL | Age: 69
LOS: 1 days | Discharge: HOME | End: 2019-10-14

## 2019-10-14 DIAGNOSIS — I48.91 UNSPECIFIED ATRIAL FIBRILLATION: ICD-10-CM

## 2019-10-28 ENCOUNTER — OUTPATIENT (OUTPATIENT)
Dept: OUTPATIENT SERVICES | Facility: HOSPITAL | Age: 69
LOS: 1 days | Discharge: HOME | End: 2019-10-28

## 2019-10-28 DIAGNOSIS — I48.91 UNSPECIFIED ATRIAL FIBRILLATION: ICD-10-CM

## 2019-11-11 ENCOUNTER — OUTPATIENT (OUTPATIENT)
Dept: OUTPATIENT SERVICES | Facility: HOSPITAL | Age: 69
LOS: 1 days | Discharge: HOME | End: 2019-11-11

## 2019-11-11 DIAGNOSIS — I48.91 UNSPECIFIED ATRIAL FIBRILLATION: ICD-10-CM

## 2019-11-25 ENCOUNTER — OUTPATIENT (OUTPATIENT)
Dept: OUTPATIENT SERVICES | Facility: HOSPITAL | Age: 69
LOS: 1 days | Discharge: HOME | End: 2019-11-25

## 2019-11-25 DIAGNOSIS — I48.91 UNSPECIFIED ATRIAL FIBRILLATION: ICD-10-CM

## 2019-12-09 ENCOUNTER — OUTPATIENT (OUTPATIENT)
Dept: OUTPATIENT SERVICES | Facility: HOSPITAL | Age: 69
LOS: 1 days | Discharge: HOME | End: 2019-12-09

## 2019-12-09 DIAGNOSIS — I48.91 UNSPECIFIED ATRIAL FIBRILLATION: ICD-10-CM

## 2019-12-23 ENCOUNTER — OUTPATIENT (OUTPATIENT)
Dept: OUTPATIENT SERVICES | Facility: HOSPITAL | Age: 69
LOS: 1 days | Discharge: HOME | End: 2019-12-23

## 2019-12-23 DIAGNOSIS — I48.91 UNSPECIFIED ATRIAL FIBRILLATION: ICD-10-CM

## 2021-04-19 NOTE — PHYSICAL THERAPY INITIAL EVALUATION ADULT - PHYSICAL ASSIST/NONPHYSICAL ASSIST: STAND/SIT, REHAB EVAL
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

## 2021-11-27 ENCOUNTER — INPATIENT (INPATIENT)
Facility: HOSPITAL | Age: 71
LOS: 1 days | Discharge: NURSING, REHAB OR LONG-TERM CARE | End: 2021-11-29
Attending: INTERNAL MEDICINE | Admitting: INTERNAL MEDICINE
Payer: MEDICARE

## 2021-11-27 VITALS
DIASTOLIC BLOOD PRESSURE: 99 MMHG | RESPIRATION RATE: 18 BRPM | HEART RATE: 60 BPM | TEMPERATURE: 97 F | OXYGEN SATURATION: 98 % | SYSTOLIC BLOOD PRESSURE: 182 MMHG | WEIGHT: 199.96 LBS | HEIGHT: 71 IN

## 2021-11-27 DIAGNOSIS — I63.9 CEREBRAL INFARCTION, UNSPECIFIED: ICD-10-CM

## 2021-11-27 DIAGNOSIS — I10 ESSENTIAL (PRIMARY) HYPERTENSION: ICD-10-CM

## 2021-11-27 DIAGNOSIS — Z86.73 PERSONAL HISTORY OF TRANSIENT ISCHEMIC ATTACK (TIA), AND CEREBRAL INFARCTION WITHOUT RESIDUAL DEFICITS: ICD-10-CM

## 2021-11-27 DIAGNOSIS — I48.91 UNSPECIFIED ATRIAL FIBRILLATION: ICD-10-CM

## 2021-11-27 DIAGNOSIS — E78.5 HYPERLIPIDEMIA, UNSPECIFIED: ICD-10-CM

## 2021-11-27 DIAGNOSIS — M54.30 SCIATICA, UNSPECIFIED SIDE: ICD-10-CM

## 2021-11-27 DIAGNOSIS — R53.1 WEAKNESS: ICD-10-CM

## 2021-11-27 LAB
ALBUMIN SERPL ELPH-MCNC: 4.4 G/DL — SIGNIFICANT CHANGE UP (ref 3.5–5.2)
ALP SERPL-CCNC: 137 U/L — HIGH (ref 30–115)
ALT FLD-CCNC: 26 U/L — SIGNIFICANT CHANGE UP (ref 0–41)
ANION GAP SERPL CALC-SCNC: 9 MMOL/L — SIGNIFICANT CHANGE UP (ref 7–14)
APPEARANCE UR: CLEAR — SIGNIFICANT CHANGE UP
APTT BLD: 40.1 SEC — HIGH (ref 27–39.2)
AST SERPL-CCNC: 17 U/L — SIGNIFICANT CHANGE UP (ref 0–41)
BASOPHILS # BLD AUTO: 0.04 K/UL — SIGNIFICANT CHANGE UP (ref 0–0.2)
BASOPHILS NFR BLD AUTO: 0.6 % — SIGNIFICANT CHANGE UP (ref 0–1)
BILIRUB SERPL-MCNC: 0.5 MG/DL — SIGNIFICANT CHANGE UP (ref 0.2–1.2)
BILIRUB UR-MCNC: NEGATIVE — SIGNIFICANT CHANGE UP
BUN SERPL-MCNC: 28 MG/DL — HIGH (ref 10–20)
CALCIUM SERPL-MCNC: 9.1 MG/DL — SIGNIFICANT CHANGE UP (ref 8.5–10.1)
CHLORIDE SERPL-SCNC: 102 MMOL/L — SIGNIFICANT CHANGE UP (ref 98–110)
CO2 SERPL-SCNC: 27 MMOL/L — SIGNIFICANT CHANGE UP (ref 17–32)
COLOR SPEC: YELLOW — SIGNIFICANT CHANGE UP
CREAT SERPL-MCNC: 1.5 MG/DL — SIGNIFICANT CHANGE UP (ref 0.7–1.5)
DIFF PNL FLD: ABNORMAL
DIGOXIN SERPL-MCNC: 0.7 NG/ML — LOW (ref 0.8–2)
EOSINOPHIL # BLD AUTO: 0.14 K/UL — SIGNIFICANT CHANGE UP (ref 0–0.7)
EOSINOPHIL NFR BLD AUTO: 2 % — SIGNIFICANT CHANGE UP (ref 0–8)
EPI CELLS # UR: ABNORMAL /HPF
GLUCOSE SERPL-MCNC: 96 MG/DL — SIGNIFICANT CHANGE UP (ref 70–99)
GLUCOSE UR QL: NEGATIVE MG/DL — SIGNIFICANT CHANGE UP
HCT VFR BLD CALC: 43.5 % — SIGNIFICANT CHANGE UP (ref 42–52)
HGB BLD-MCNC: 13.6 G/DL — LOW (ref 14–18)
IMM GRANULOCYTES NFR BLD AUTO: 0.7 % — HIGH (ref 0.1–0.3)
INR BLD: 1.9 RATIO — HIGH (ref 0.65–1.3)
KETONES UR-MCNC: NEGATIVE — SIGNIFICANT CHANGE UP
LEUKOCYTE ESTERASE UR-ACNC: NEGATIVE — SIGNIFICANT CHANGE UP
LYMPHOCYTES # BLD AUTO: 1.01 K/UL — LOW (ref 1.2–3.4)
LYMPHOCYTES # BLD AUTO: 14.5 % — LOW (ref 20.5–51.1)
MAGNESIUM SERPL-MCNC: 2.1 MG/DL — SIGNIFICANT CHANGE UP (ref 1.8–2.4)
MCHC RBC-ENTMCNC: 27.3 PG — SIGNIFICANT CHANGE UP (ref 27–31)
MCHC RBC-ENTMCNC: 31.3 G/DL — LOW (ref 32–37)
MCV RBC AUTO: 87.3 FL — SIGNIFICANT CHANGE UP (ref 80–94)
MONOCYTES # BLD AUTO: 0.56 K/UL — SIGNIFICANT CHANGE UP (ref 0.1–0.6)
MONOCYTES NFR BLD AUTO: 8 % — SIGNIFICANT CHANGE UP (ref 1.7–9.3)
NEUTROPHILS # BLD AUTO: 5.18 K/UL — SIGNIFICANT CHANGE UP (ref 1.4–6.5)
NEUTROPHILS NFR BLD AUTO: 74.2 % — SIGNIFICANT CHANGE UP (ref 42.2–75.2)
NITRITE UR-MCNC: NEGATIVE — SIGNIFICANT CHANGE UP
NRBC # BLD: 0 /100 WBCS — SIGNIFICANT CHANGE UP (ref 0–0)
PH UR: 7 — SIGNIFICANT CHANGE UP (ref 5–8)
PLATELET # BLD AUTO: 173 K/UL — SIGNIFICANT CHANGE UP (ref 130–400)
POTASSIUM SERPL-MCNC: 4.4 MMOL/L — SIGNIFICANT CHANGE UP (ref 3.5–5)
POTASSIUM SERPL-SCNC: 4.4 MMOL/L — SIGNIFICANT CHANGE UP (ref 3.5–5)
PROT SERPL-MCNC: 7.3 G/DL — SIGNIFICANT CHANGE UP (ref 6–8)
PROT UR-MCNC: 30 MG/DL
PROTHROM AB SERPL-ACNC: 21.7 SEC — HIGH (ref 9.95–12.87)
RBC # BLD: 4.98 M/UL — SIGNIFICANT CHANGE UP (ref 4.7–6.1)
RBC # FLD: 15 % — HIGH (ref 11.5–14.5)
RBC CASTS # UR COMP ASSIST: ABNORMAL /HPF
SARS-COV-2 RNA SPEC QL NAA+PROBE: SIGNIFICANT CHANGE UP
SODIUM SERPL-SCNC: 138 MMOL/L — SIGNIFICANT CHANGE UP (ref 135–146)
SP GR SPEC: 1.02 — SIGNIFICANT CHANGE UP (ref 1.01–1.03)
UROBILINOGEN FLD QL: 0.2 MG/DL — SIGNIFICANT CHANGE UP
WBC # BLD: 6.98 K/UL — SIGNIFICANT CHANGE UP (ref 4.8–10.8)
WBC # FLD AUTO: 6.98 K/UL — SIGNIFICANT CHANGE UP (ref 4.8–10.8)

## 2021-11-27 PROCEDURE — 93010 ELECTROCARDIOGRAM REPORT: CPT

## 2021-11-27 PROCEDURE — 99285 EMERGENCY DEPT VISIT HI MDM: CPT

## 2021-11-27 PROCEDURE — 71045 X-RAY EXAM CHEST 1 VIEW: CPT | Mod: 26

## 2021-11-27 RX ORDER — LANOLIN ALCOHOL/MO/W.PET/CERES
3 CREAM (GRAM) TOPICAL AT BEDTIME
Refills: 0 | Status: DISCONTINUED | OUTPATIENT
Start: 2021-11-27 | End: 2021-11-29

## 2021-11-27 RX ORDER — FUROSEMIDE 40 MG
20 TABLET ORAL DAILY
Refills: 0 | Status: DISCONTINUED | OUTPATIENT
Start: 2021-11-27 | End: 2021-11-29

## 2021-11-27 RX ORDER — CHLORHEXIDINE GLUCONATE 213 G/1000ML
1 SOLUTION TOPICAL
Refills: 0 | Status: DISCONTINUED | OUTPATIENT
Start: 2021-11-27 | End: 2021-11-29

## 2021-11-27 RX ORDER — METOPROLOL TARTRATE 50 MG
1.5 TABLET ORAL
Qty: 0 | Refills: 0 | DISCHARGE

## 2021-11-27 RX ORDER — ATORVASTATIN CALCIUM 80 MG/1
20 TABLET, FILM COATED ORAL AT BEDTIME
Refills: 0 | Status: DISCONTINUED | OUTPATIENT
Start: 2021-11-27 | End: 2021-11-29

## 2021-11-27 RX ORDER — GABAPENTIN 400 MG/1
200 CAPSULE ORAL AT BEDTIME
Refills: 0 | Status: DISCONTINUED | OUTPATIENT
Start: 2021-11-27 | End: 2021-11-29

## 2021-11-27 RX ORDER — PANTOPRAZOLE SODIUM 20 MG/1
40 TABLET, DELAYED RELEASE ORAL
Refills: 0 | Status: DISCONTINUED | OUTPATIENT
Start: 2021-11-27 | End: 2021-11-29

## 2021-11-27 RX ORDER — METOPROLOL TARTRATE 50 MG
150 TABLET ORAL DAILY
Refills: 0 | Status: DISCONTINUED | OUTPATIENT
Start: 2021-11-27 | End: 2021-11-29

## 2021-11-27 RX ORDER — WARFARIN SODIUM 2.5 MG/1
3 TABLET ORAL ONCE
Refills: 0 | Status: COMPLETED | OUTPATIENT
Start: 2021-11-27 | End: 2021-11-27

## 2021-11-27 RX ORDER — AMLODIPINE BESYLATE 2.5 MG/1
5 TABLET ORAL DAILY
Refills: 0 | Status: DISCONTINUED | OUTPATIENT
Start: 2021-11-27 | End: 2021-11-29

## 2021-11-27 RX ORDER — BACLOFEN 100 %
10 POWDER (GRAM) MISCELLANEOUS THREE TIMES A DAY
Refills: 0 | Status: DISCONTINUED | OUTPATIENT
Start: 2021-11-27 | End: 2021-11-29

## 2021-11-27 RX ORDER — DIGOXIN 250 MCG
125 TABLET ORAL DAILY
Refills: 0 | Status: DISCONTINUED | OUTPATIENT
Start: 2021-11-27 | End: 2021-11-29

## 2021-11-27 RX ORDER — BACLOFEN 100 %
1 POWDER (GRAM) MISCELLANEOUS
Qty: 0 | Refills: 0 | DISCHARGE

## 2021-11-27 RX ORDER — INFLUENZA VIRUS VACCINE 15; 15; 15; 15 UG/.5ML; UG/.5ML; UG/.5ML; UG/.5ML
0.7 SUSPENSION INTRAMUSCULAR ONCE
Refills: 0 | Status: DISCONTINUED | OUTPATIENT
Start: 2021-11-27 | End: 2021-11-29

## 2021-11-27 RX ORDER — AMLODIPINE BESYLATE 2.5 MG/1
0 TABLET ORAL
Qty: 0 | Refills: 0 | DISCHARGE

## 2021-11-27 RX ORDER — ACETAMINOPHEN 500 MG
650 TABLET ORAL EVERY 6 HOURS
Refills: 0 | Status: DISCONTINUED | OUTPATIENT
Start: 2021-11-27 | End: 2021-11-29

## 2021-11-27 RX ADMIN — WARFARIN SODIUM 3 MILLIGRAM(S): 2.5 TABLET ORAL at 21:30

## 2021-11-27 RX ADMIN — GABAPENTIN 200 MILLIGRAM(S): 400 CAPSULE ORAL at 21:29

## 2021-11-27 RX ADMIN — Medication 10 MILLIGRAM(S): at 21:30

## 2021-11-27 RX ADMIN — AMLODIPINE BESYLATE 5 MILLIGRAM(S): 2.5 TABLET ORAL at 17:59

## 2021-11-27 RX ADMIN — ATORVASTATIN CALCIUM 20 MILLIGRAM(S): 80 TABLET, FILM COATED ORAL at 21:30

## 2021-11-27 NOTE — H&P ADULT - HISTORY OF PRESENT ILLNESS
Pt is a 71M w/ PMH HTN, HLD, afib and hx CVA (w/ residual right sided hemiparesis UE >LE) being admitted for progressive weakness and placement. Pt accompanied by his sister who provided collateral. Pt has been experiencing increased weakness over the past few days, which has happened in the past. So, he was sent in by his PCP for STR placement for increased PT. Denies HA, dizziness, fever/chills, SOB, chest pain, NVD, abdominal pain, change in urination and change in BM.    In ED, pt is afebrile w/ WBC: 6.9. BP: 182/99, HR: 60, INR: 1.9. CXR: b/l opacities.

## 2021-11-27 NOTE — H&P ADULT - PROBLEM/PLAN-2
PSR: Pls notify pt that referral for Dr. Roseline Aldana has been approved by her insurance (pt should be seeing her for hypertension). Pls also provide contact information below. Thanks. Contact information:   Roseline Aldana M.D.   Endocrinology, Desire DISPLAY PLAN FREE TEXT

## 2021-11-27 NOTE — H&P ADULT - NSICDXPASTMEDICALHX_GEN_ALL_CORE_FT
PAST MEDICAL HISTORY:  Afib     CVA (cerebrovascular accident) CVA 12 years ago with right sided weakness, uses four prong cane    HTN (hypertension)     Right sided weakness     Sciatica

## 2021-11-27 NOTE — ED PROVIDER NOTE - OBJECTIVE STATEMENT
71 year old male past medical history of CVA in 2014 with right sided weakness and aphasia and confusion comes to emergency room for weakness. patient over the last few weeks having a steady decline in strength and family has been having tough time taking care of him. sister explains that this has happened in the past and he goes to rehab for awhile and usually regains some strength. family d/w dr. quintanilla and told to bring to emergency room for admission to see PT and go to rehab. denies trauma.

## 2021-11-27 NOTE — H&P ADULT - PROBLEM SELECTOR PLAN 3
-  - On coumadin   - INR: 1.9 today  - Normally on Coumadin 2.5mg QHS; will order 3mg for tonight   - Monitor INR   - Continue metoprolol and digoxin

## 2021-11-27 NOTE — H&P ADULT - NSHPLABSRESULTS_GEN_ALL_CORE
13.6   6.98  )-----------( 173      ( 27 Nov 2021 12:55 )             43.5       11-27    138  |  102  |  28<H>  ----------------------------<  96  4.4   |  27  |  1.5    Ca    9.1      27 Nov 2021 12:55  Mg     2.1     11-27    TPro  7.3  /  Alb  4.4  /  TBili  0.5  /  DBili  x   /  AST  17  /  ALT  26  /  AlkPhos  137<H>  11-27          Magnesium, Serum: 2.1 mg/dL (11-27-21 @ 12:55)              PT/INR - ( 27 Nov 2021 12:55 )   PT: 21.70 sec;   INR: 1.90 ratio         PTT - ( 27 Nov 2021 12:55 )  PTT:40.1 sec    < from: Xray Chest 1 View-PORTABLE IMMEDIATE (11.27.21 @ 13:17) >    Impression:    Bilateral opacities. Stable cardiomegaly        --- End of Report ---    < end of copied text >

## 2021-11-27 NOTE — ED PROVIDER NOTE - NSICDXPASTMEDICALHX_GEN_ALL_CORE_FT
PAST MEDICAL HISTORY:  Afib     HTN (hypertension)     Kidney stone     Sciatica     TIA (transient ischemic attack) CVA 12 years ago with right sided weakness, uses four prong cane     Hyponatremia

## 2021-11-27 NOTE — H&P ADULT - ASSESSMENT
ASSESSMENT: Pt is a 71M w/ PMH HTN, HLD, afib and hx CVA (w/ residual right sided hemiparesis UE >LE) being admitted for progressive weakness and placement. In ED, pt is afebrile w/ WBC: 6.9. BP: 182/99, HR: 60, INR: 1.9. CXR: b/l opacities.       PLAN: Case d/w Dr. Jimenes  - Admit to inpatient level of care - medicine  - AM labs  - Monitor vitals   - CHG bath   - DASH diet  - Continue home medications  - VTE ppx: on coumadin  - Monitor INR   - GI ppx: protonix  ASSESSMENT: Pt is a 71M w/ PMH HTN, HLD, afib and hx CVA (w/ residual right sided hemiparesis UE >LE) being admitted for progressive weakness and placement. In ED, pt is afebrile w/ WBC: 6.9. BP: 182/99, HR: 60, INR: 1.9. CXR: b/l opacities.       PLAN:   - Admit to inpatient level of care - medicine  - AM labs  - Monitor vitals   - CHG bath   - DASH diet  - Continue home medications  - VTE ppx: on coumadin  - Monitor INR   - GI ppx: protonix

## 2021-11-27 NOTE — H&P ADULT - TIME BILLING
Agree with the above H& P    vs noted     NAD  lungs clear  heart S1S2  abdomen benign  extremities + edema  right hemiparesis, expressive aphasia    Labs:                        13.9   7.69  )-----------( 206      ( 2021 08:14 )             43.4                 141  |  105  |  23<H>  ----------------------------<  101<H>  4.8   |  26  |  1.3    Ca    8.9      2021 08:14  Mg     2.1         TPro  7.3  /  Alb  4.4  /  TBili  0.5  /  DBili  x   /  AST  17  /  ALT  26  /  AlkPhos  137<H>      LIVER FUNCTIONS - ( 2021 12:55 )  Alb: 4.4 g/dL / Pro: 7.3 g/dL / ALK PHOS: 137 U/L / ALT: 26 U/L / AST: 17 U/L / GGT: x                   PT/INR - ( 2021 08:14 )   PT: 23.00 sec;   INR: 2.01 ratio         PTT - ( 2021 08:14 )  PTT:36.4 sec        Urinalysis Basic - ( 2021 19:35 )    Color: Yellow / Appearance: Clear / S.020 / pH: x  Gluc: x / Ketone: Negative  / Bili: Negative / Urobili: 0.2 mg/dL   Blood: x / Protein: 30 mg/dL / Nitrite: Negative   Leuk Esterase: Negative / RBC: 3-5 /HPF / WBC x   Sq Epi: x / Non Sq Epi: Occasional /HPF / Bacteria: x      A/P  Weakness, Decline in functional status  hx of CVA with right anny and expressive aphasia  - rehab evaluation  - will need SA rehab    A fib  - rate controlled  - anticoagulated on Coumadin  - INR therapeutic    HTN  - continue home medications    HLD  - maintain on statin

## 2021-11-27 NOTE — ED ADULT TRIAGE NOTE - CHIEF COMPLAINT QUOTE
Pt has hx of stroke 14 yrs ago with R sided weakness and aphasia. Pts sister states that pt needs rehab and wants pt admitted to [place]  Pt has no complaints

## 2021-11-27 NOTE — H&P ADULT - PROBLEM SELECTOR PLAN 2
-  - With residual right sided hemiparesis RUE > LUE   - Pt walks with 4 pronged cane at home; but is not able to use RUE at all

## 2021-11-27 NOTE — ED PROVIDER NOTE - PHYSICAL EXAMINATION
Physical Exam    Vital Signs: I have reviewed the initial vital signs.  Constitutional: well-nourished, appears stated age, no acute distress  Eyes: Conjunctiva pink, Sclera clear, PERRLA, EOMI.  Cardiovascular: S1 and S2, regular rate, regular rhythm, well-perfused extremities, radial pulses equal and 2+  Respiratory: unlabored respiratory effort, clear to auscultation bilaterally no wheezing, rales and rhonchi  Gastrointestinal: soft, non-tender abdomen, no pulsatile mass, normal bowl sounds  Musculoskeletal: supple neck, +right side lower extremity edema(unchanged as per family), no midline tenderness  Integumentary: warm, dry,  right sided chronic skin changes   Neurologic: awake, alert, +aphasia with right sided weakness   Psychiatric: appropriate mood, appropriate affect

## 2021-11-27 NOTE — H&P ADULT - NSHPPHYSICALEXAM_GEN_ALL_CORE
T(C): 36.4 (11-27-21 @ 15:29), Max: 36.9 (11-27-21 @ 14:36)  HR: 71 (11-27-21 @ 15:29) (60 - 78)  BP: 176/92 (11-27-21 @ 15:29) (173/97 - 182/99)  RR: 16 (11-27-21 @ 15:29) (16 - 20)  SpO2: 99% (11-27-21 @ 14:36) (98% - 99%)    PHYSICAL EXAM:  GENERAL: NAD, AOx3  HEAD:  Atraumatic, Normocephalic  CHEST/LUNG: Clear to auscultation bilaterally; No rales, rhonchi or wheezing  HEART: S1,S2 Regular rate and rhythm; No murmurs, rubs, or gallops  ABDOMEN: Soft, nontender, nondistended, no rebound tenderness; +BS  EXTREMITIES:  2+ peripheral pulses bilaterally and symmetrically, no clubbing OR cyanosis. + RUE and RLE edema   LYMPH: No lymphadenopathy  PSYCH: AAOx3, normal mood and affect  SKIN: +venous stasis changes of b/l LE

## 2021-11-27 NOTE — H&P ADULT - PROBLEM SELECTOR PLAN 1
-  - Needs STR placement  - UA pending results   - PT / physiatry consults  - Case management consult

## 2021-11-27 NOTE — ED ADULT NURSE NOTE - NSICDXPASTMEDICALHX_GEN_ALL_CORE_FT
PAST MEDICAL HISTORY:  Afib     HTN (hypertension)     Kidney stone     Sciatica     TIA (transient ischemic attack) CVA 12 years ago with right sided weakness, uses four prong cane     PAST MEDICAL HISTORY:  Afib     HTN (hypertension)     Kidney stone     Right sided weakness     Sciatica     TIA (transient ischemic attack) CVA 12 years ago with right sided weakness, uses four prong cane

## 2021-11-27 NOTE — ED PROVIDER NOTE - CLINICAL SUMMARY MEDICAL DECISION MAKING FREE TEXT BOX
pt with history of cva, aphasia, presenting with family as they are unable to continue caring for him at this time. no other acute concerns or complaints. Well appearing, NAD, non toxic. NCAT PERRLA EOMI neck supple non tender normal wob cta bl irregular abdomen s nt nd no rebound no guarding [id number] neuro at baseline. dw pmd; will admit

## 2021-11-27 NOTE — ED ADULT NURSE NOTE - CHIEF COMPLAINT QUOTE
Pt has hx of stroke 14 yrs ago with R sided weakness and aphasia. Pts sister states that pt needs rehab and wants pt admitted to [facility]  Pt has no complaints

## 2021-11-27 NOTE — H&P ADULT - NSHPADDITIONALINFOADULT_GEN_ALL_CORE
Patient lives with sister. Patient has been less active due to Covid pandemic, functional status has declined.  Patient previously in [facility] post stroke and again post DVT.

## 2021-11-28 LAB
ANION GAP SERPL CALC-SCNC: 10 MMOL/L — SIGNIFICANT CHANGE UP (ref 7–14)
APTT BLD: 36.4 SEC — SIGNIFICANT CHANGE UP (ref 27–39.2)
BUN SERPL-MCNC: 23 MG/DL — HIGH (ref 10–20)
CALCIUM SERPL-MCNC: 8.9 MG/DL — SIGNIFICANT CHANGE UP (ref 8.5–10.1)
CHLORIDE SERPL-SCNC: 105 MMOL/L — SIGNIFICANT CHANGE UP (ref 98–110)
CO2 SERPL-SCNC: 26 MMOL/L — SIGNIFICANT CHANGE UP (ref 17–32)
COVID-19 NUCLEOCAPSID GAM AB INTERP: NEGATIVE — SIGNIFICANT CHANGE UP
COVID-19 NUCLEOCAPSID TOTAL GAM ANTIBODY RESULT: 0.08 INDEX — SIGNIFICANT CHANGE UP
COVID-19 SPIKE DOMAIN AB INTERP: POSITIVE
COVID-19 SPIKE DOMAIN ANTIBODY RESULT: >250 U/ML — HIGH
CREAT SERPL-MCNC: 1.3 MG/DL — SIGNIFICANT CHANGE UP (ref 0.7–1.5)
GLUCOSE SERPL-MCNC: 101 MG/DL — HIGH (ref 70–99)
HCT VFR BLD CALC: 43.4 % — SIGNIFICANT CHANGE UP (ref 42–52)
HGB BLD-MCNC: 13.9 G/DL — LOW (ref 14–18)
INR BLD: 2.01 RATIO — HIGH (ref 0.65–1.3)
MCHC RBC-ENTMCNC: 27.6 PG — SIGNIFICANT CHANGE UP (ref 27–31)
MCHC RBC-ENTMCNC: 32 G/DL — SIGNIFICANT CHANGE UP (ref 32–37)
MCV RBC AUTO: 86.3 FL — SIGNIFICANT CHANGE UP (ref 80–94)
NRBC # BLD: 0 /100 WBCS — SIGNIFICANT CHANGE UP (ref 0–0)
PLATELET # BLD AUTO: 206 K/UL — SIGNIFICANT CHANGE UP (ref 130–400)
POTASSIUM SERPL-MCNC: 4.8 MMOL/L — SIGNIFICANT CHANGE UP (ref 3.5–5)
POTASSIUM SERPL-SCNC: 4.8 MMOL/L — SIGNIFICANT CHANGE UP (ref 3.5–5)
PROTHROM AB SERPL-ACNC: 23 SEC — HIGH (ref 9.95–12.87)
RBC # BLD: 5.03 M/UL — SIGNIFICANT CHANGE UP (ref 4.7–6.1)
RBC # FLD: 15.2 % — HIGH (ref 11.5–14.5)
SARS-COV-2 IGG+IGM SERPL QL IA: 0.08 INDEX — SIGNIFICANT CHANGE UP
SARS-COV-2 IGG+IGM SERPL QL IA: >250 U/ML — HIGH
SARS-COV-2 IGG+IGM SERPL QL IA: NEGATIVE — SIGNIFICANT CHANGE UP
SARS-COV-2 IGG+IGM SERPL QL IA: POSITIVE
SODIUM SERPL-SCNC: 141 MMOL/L — SIGNIFICANT CHANGE UP (ref 135–146)
WBC # BLD: 7.69 K/UL — SIGNIFICANT CHANGE UP (ref 4.8–10.8)
WBC # FLD AUTO: 7.69 K/UL — SIGNIFICANT CHANGE UP (ref 4.8–10.8)

## 2021-11-28 RX ORDER — WARFARIN SODIUM 2.5 MG/1
2.5 TABLET ORAL ONCE
Refills: 0 | Status: COMPLETED | OUTPATIENT
Start: 2021-11-28 | End: 2021-11-28

## 2021-11-28 RX ADMIN — Medication 20 MILLIGRAM(S): at 06:15

## 2021-11-28 RX ADMIN — AMLODIPINE BESYLATE 5 MILLIGRAM(S): 2.5 TABLET ORAL at 06:15

## 2021-11-28 RX ADMIN — PANTOPRAZOLE SODIUM 40 MILLIGRAM(S): 20 TABLET, DELAYED RELEASE ORAL at 06:15

## 2021-11-28 RX ADMIN — GABAPENTIN 200 MILLIGRAM(S): 400 CAPSULE ORAL at 21:20

## 2021-11-28 RX ADMIN — Medication 150 MILLIGRAM(S): at 06:15

## 2021-11-28 RX ADMIN — WARFARIN SODIUM 2.5 MILLIGRAM(S): 2.5 TABLET ORAL at 21:20

## 2021-11-28 RX ADMIN — ATORVASTATIN CALCIUM 20 MILLIGRAM(S): 80 TABLET, FILM COATED ORAL at 21:20

## 2021-11-28 RX ADMIN — Medication 10 MILLIGRAM(S): at 21:20

## 2021-11-28 RX ADMIN — Medication 125 MICROGRAM(S): at 06:15

## 2021-11-28 NOTE — PHYSICAL THERAPY INITIAL EVALUATION ADULT - ADDITIONAL COMMENTS
Per patient, he lives in a private house with sister, has 2 steps with 2 rails to enter from side entrance, has 10 steps with (L) rail going up to bedroom; was using a quad cane

## 2021-11-28 NOTE — PHYSICAL THERAPY INITIAL EVALUATION ADULT - GENERAL OBSERVATIONS, REHAB EVAL
11:00-11:28 Chart reviewed. Pt encountered semireclined in bed, may be seen by Physical Therapist as confirmed with Nurse. Patient denied pain at rest and ready to try to stand but  "feels weak"; + discoloration distal BLE

## 2021-11-28 NOTE — PHYSICAL THERAPY INITIAL EVALUATION ADULT - ASSISTIVE DEVICE FOR TRANSFER: GAIT, REHAB EVAL
PT STATES STOOL IS OF LIGHT CONSISTENCY, URINE LIKE COLOR. PT TOLERATING WATER INTAKE AFTER 
THE BOWEL PREP. PT STATES PAIN IS INCREASING. WILL ENDORSE TO AM SHIFT RN FOR PT'S 
CONTINUITY OF CARE. holding on with (L) hand/rolling walker

## 2021-11-28 NOTE — PHYSICAL THERAPY INITIAL EVALUATION ADULT - PERTINENT HX OF CURRENT PROBLEM, REHAB EVAL
70 y/o male admitted with diagnosis of Weakness with family noting declining strength at home and family having a harder time taking care of patient

## 2021-11-28 NOTE — PHYSICAL THERAPY INITIAL EVALUATION ADULT - GAIT DEVIATIONS NOTED, PT EVAL
stooped posture, dec heel strike/pushoff /stance on RLE, dec LO, incomplete foot clearance/decreased mariama/decreased step length/decreased weight-shifting ability

## 2021-11-29 VITALS
RESPIRATION RATE: 16 BRPM | HEART RATE: 71 BPM | TEMPERATURE: 98 F | SYSTOLIC BLOOD PRESSURE: 157 MMHG | DIASTOLIC BLOOD PRESSURE: 83 MMHG

## 2021-11-29 LAB
ANION GAP SERPL CALC-SCNC: 9 MMOL/L — SIGNIFICANT CHANGE UP (ref 7–14)
APTT BLD: 37.7 SEC — SIGNIFICANT CHANGE UP (ref 27–39.2)
BUN SERPL-MCNC: 27 MG/DL — HIGH (ref 10–20)
CALCIUM SERPL-MCNC: 8.8 MG/DL — SIGNIFICANT CHANGE UP (ref 8.5–10.1)
CHLORIDE SERPL-SCNC: 108 MMOL/L — SIGNIFICANT CHANGE UP (ref 98–110)
CO2 SERPL-SCNC: 25 MMOL/L — SIGNIFICANT CHANGE UP (ref 17–32)
CREAT SERPL-MCNC: 1.7 MG/DL — HIGH (ref 0.7–1.5)
GLUCOSE SERPL-MCNC: 101 MG/DL — HIGH (ref 70–99)
HCT VFR BLD CALC: 43.6 % — SIGNIFICANT CHANGE UP (ref 42–52)
HGB BLD-MCNC: 14.1 G/DL — SIGNIFICANT CHANGE UP (ref 14–18)
INR BLD: 2.05 RATIO — HIGH (ref 0.65–1.3)
MCHC RBC-ENTMCNC: 27.8 PG — SIGNIFICANT CHANGE UP (ref 27–31)
MCHC RBC-ENTMCNC: 32.3 G/DL — SIGNIFICANT CHANGE UP (ref 32–37)
MCV RBC AUTO: 85.8 FL — SIGNIFICANT CHANGE UP (ref 80–94)
NRBC # BLD: 0 /100 WBCS — SIGNIFICANT CHANGE UP (ref 0–0)
PLATELET # BLD AUTO: 186 K/UL — SIGNIFICANT CHANGE UP (ref 130–400)
POTASSIUM SERPL-MCNC: 4.5 MMOL/L — SIGNIFICANT CHANGE UP (ref 3.5–5)
POTASSIUM SERPL-SCNC: 4.5 MMOL/L — SIGNIFICANT CHANGE UP (ref 3.5–5)
PROTHROM AB SERPL-ACNC: 23.4 SEC — HIGH (ref 9.95–12.87)
RBC # BLD: 5.08 M/UL — SIGNIFICANT CHANGE UP (ref 4.7–6.1)
RBC # FLD: 14.9 % — HIGH (ref 11.5–14.5)
SODIUM SERPL-SCNC: 142 MMOL/L — SIGNIFICANT CHANGE UP (ref 135–146)
WBC # BLD: 8.08 K/UL — SIGNIFICANT CHANGE UP (ref 4.8–10.8)
WBC # FLD AUTO: 8.08 K/UL — SIGNIFICANT CHANGE UP (ref 4.8–10.8)

## 2021-11-29 RX ORDER — METOPROLOL TARTRATE 50 MG
3 TABLET ORAL
Qty: 0 | Refills: 0 | DISCHARGE
Start: 2021-11-29

## 2021-11-29 RX ORDER — PANTOPRAZOLE SODIUM 20 MG/1
1 TABLET, DELAYED RELEASE ORAL
Qty: 0 | Refills: 0 | DISCHARGE
Start: 2021-11-29

## 2021-11-29 RX ORDER — ACETAMINOPHEN 500 MG
2 TABLET ORAL
Qty: 0 | Refills: 0 | DISCHARGE
Start: 2021-11-29

## 2021-11-29 RX ORDER — METOPROLOL TARTRATE 50 MG
1.5 TABLET ORAL
Qty: 0 | Refills: 0 | DISCHARGE

## 2021-11-29 RX ADMIN — Medication 125 MICROGRAM(S): at 05:22

## 2021-11-29 RX ADMIN — Medication 20 MILLIGRAM(S): at 05:24

## 2021-11-29 RX ADMIN — Medication 150 MILLIGRAM(S): at 05:22

## 2021-11-29 RX ADMIN — AMLODIPINE BESYLATE 5 MILLIGRAM(S): 2.5 TABLET ORAL at 05:23

## 2021-11-29 RX ADMIN — PANTOPRAZOLE SODIUM 40 MILLIGRAM(S): 20 TABLET, DELAYED RELEASE ORAL at 05:24

## 2021-11-29 RX ADMIN — Medication 20 MILLIGRAM(S): at 05:23

## 2021-11-29 RX ADMIN — Medication 650 MILLIGRAM(S): at 05:24

## 2021-11-29 RX ADMIN — Medication 650 MILLIGRAM(S): at 03:41

## 2021-11-29 NOTE — OCCUPATIONAL THERAPY INITIAL EVALUATION ADULT - LIVES WITH, PROFILE
patient reports living with sister and brother in law in a private home with no steps to enter and 10 steps to bedroom with L side rail + walk- in shower/other relative

## 2021-11-29 NOTE — PROGRESS NOTE ADULT - SUBJECTIVE AND OBJECTIVE BOX
DICKJOHNDIONICIOMARCELO  71y  Male  HPI:  Pt is a 71M w/ PMH HTN, HLD, afib and hx CVA (w/ residual right sided hemiparesis UE >LE) being admitted for progressive weakness and placement. Pt accompanied by his sister who provided collateral. Pt has been experiencing increased weakness over the past few days, which has happened in the past. So, he was sent in by his PCP for STR placement for increased PT. Denies HA, dizziness, fever/chills, SOB, chest pain, NVD, abdominal pain, change in urination and change in BM.    In ED, pt is afebrile w/ WBC: 6.9. BP: 182/99, HR: 60, INR: 1.9. CXR: b/l opacities.  (2021 15:55)    MEDICATIONS  (STANDING):  amLODIPine   Tablet 5 milliGRAM(s) Oral daily  atorvastatin 20 milliGRAM(s) Oral at bedtime  chlorhexidine 4% Liquid 1 Application(s) Topical <User Schedule>  digoxin     Tablet 125 MICROGram(s) Oral daily  enalapril 20 milliGRAM(s) Oral daily  enalapril 10 milliGRAM(s) Oral at bedtime  furosemide    Tablet 20 milliGRAM(s) Oral daily  gabapentin 200 milliGRAM(s) Oral at bedtime  influenza  Vaccine (HIGH DOSE) 0.7 milliLiter(s) IntraMuscular once  metoprolol succinate  milliGRAM(s) Oral daily  pantoprazole    Tablet 40 milliGRAM(s) Oral before breakfast    MEDICATIONS  (PRN):  acetaminophen     Tablet .. 650 milliGRAM(s) Oral every 6 hours PRN Temp greater or equal to 38C (100.4F), Mild Pain (1 - 3)  baclofen 10 milliGRAM(s) Oral three times a day PRN pain  melatonin 3 milliGRAM(s) Oral at bedtime PRN Insomnia    INTERVAL EVENTS: Patient seen today without distress. Patient without complaints, was able to participate in PT    T(C): 36.7 (21 @ 14:05), Max: 36.8 (21 @ 21:12)  HR: 71 (21 @ 14:05) (71 - 87)  BP: 157/83 (21 @ 14:05) (136/93 - 160/82)  RR: 16 (- @ 14:05) (16 - 16)  SpO2: --  Wt(kg): --Vital Signs Last 24 Hrs  T(C): 36.7 (2021 14:05), Max: 36.8 (2021 21:12)  T(F): 98 (2021 14:05), Max: 98.3 (2021 21:12)  HR: 71 (2021 14:05) (71 - 87)  BP: 157/83 (2021 14:05) (136/93 - 160/82)  BP(mean): --  RR: 16 (2021 14:05) (16 - 16)  SpO2: --    PHYSICAL EXAM:  GENERAL: NAD  NECK: Supple, No JVD  CHEST/LUNG: Clear; Shallow respiration  HEART: S1, S2   ABDOMEN: Soft, Nontender, Nondistended; Bowel sounds present  EXTREMITIES: Trace edema    LABS                        14.1   8.08  )-----------( 186      ( 2021 07:43 )             43.6                 142  |  108  |  27<H>  ----------------------------<  101<H>  4.5   |  25  |  1.7<H>    Ca    8.8      2021 07:43                PT/INR - ( 2021 07:43 )   PT: 23.40 sec;   INR: 2.05 ratio         PTT - ( 2021 07:43 )  PTT:37.7 sec        Urinalysis Basic - ( 2021 19:35 )    Color: Yellow / Appearance: Clear / S.020 / pH: x  Gluc: x / Ketone: Negative  / Bili: Negative / Urobili: 0.2 mg/dL   Blood: x / Protein: 30 mg/dL / Nitrite: Negative   Leuk Esterase: Negative / RBC: 3-5 /HPF / WBC x   Sq Epi: x / Non Sq Epi: Occasional /HPF / Bacteria: x              RADIOLOGY & ADDITIONAL TESTS:

## 2021-11-29 NOTE — CONSULT NOTE ADULT - ASSESSMENT
IMPRESSION: Rehab of 89 y/o  m  rehab  for  cva  rt  hp  debility      PRECAUTIONS: [  ] Cardiac  [  ] Respiratory  [  ] Seizures [  ] Contact Isolation  [  ] Droplet Isolation  [ FALL ] Other    Weight Bearing Status:     RECOMMENDATION:    Out of Bed to Chair     DVT/Decubiti Prophylaxis    REHAB PLAN:     [  x ] Bedside P/T 3-5 times a week   [ x  ]   Bedside O/T  2-3 times a week             [   ] No Rehab Therapy Indicated                   [   ]  Speech Therapy   Conditioning/ROM                                    ADL  Bed Mobility                                               Conditioning/ROM  Transfers                                                     Bed Mobility  Sitting /Standing Balance                         Transfers                                        Gait Training                                               Sitting/Standing Balance  Stair Training [   ]Applicable                    Home equipment Eval                                                                        Splinting  [   ] Only      GOALS:   ADL   [ x  ]   Independent                    Transfers  [ x  ] Independent                          Ambulation  [x  ] Independent     [ x   ] With device                            [   x]  CG                                                         [ x  ]  CG                                                                  [x ] CG                            [ x   ] Min A                                                   [  x ] Min A                                                              [   ] Min  A          DISCHARGE PLAN:   [   ]  Good candidate for Intensive Rehabilitation/Hospital based-4A [facility]                                             Will tolerate 3hrs Intensive Rehab Daily                                       [ xx   ]  Short Term Rehab in Skilled Nursing Facility may  need  LTC ?replacement                                        [    ]  Home with Outpatient or VN services                                         [    ]  Possible Candidate for Intensive Hospital based Rehab

## 2021-11-29 NOTE — OCCUPATIONAL THERAPY INITIAL EVALUATION ADULT - RANGE OF MOTION EXAMINATION
RUE: shoulder no voluntary movement, elbow 1/2 range approximately, no voluntary movement in wrist or hand LUE: L

## 2021-11-29 NOTE — DISCHARGE NOTE PROVIDER - HOSPITAL COURSE
Pt is a 71M w/ PMH HTN, HLD, afib and hx CVA (w/ residual right sided hemiparesis UE >LE) being admitted for progressive weakness and placement. Pt accompanied by his sister who provided collateral. Pt has been experiencing increased weakness over the past few days, which has happened in the past. So, he was sent in by his PCP for STR placement for increased PT. Denies HA, dizziness, fever/chills, SOB, chest pain, NVD, abdominal pain, change in urination and change in BM.    Patient admitted to medicine service, continued on home medications and monitored.  Case management made arrangements for patient to be discharged to [facility].  Authorization received and patient transferred.  No changes to medications.

## 2021-11-29 NOTE — CONSULT NOTE ADULT - SUBJECTIVE AND OBJECTIVE BOX
HPI: 72 y/o M ptn   w/ PMH HTN, HLD, afib and hx CVA (w/ residual right sided hemiparesis UE >LE) being admitted for progressive weakness and placement. Pt has been experiencing increased weakness over the past few days, which has happened in the past. So, he was sent in by his PCP for STR placement for increased PT. Denies HA, dizziness, fever/chills, SOB, chest pain, NVD, abdominal pain, change in urination and change in BM.  ptn seen at  bed  side nad  confused  has  difficulty  speaking  may  be  aphasia    PTN  REFERRED TO ACUTE  REHAB  FOR  EVAL AND  TX   PAST MEDICAL & SURGICAL HISTORY:  Sciatica    HTN (hypertension)    Afib  Right sided weakness  CVA 12 years ago with right sided weakness, uses four prong cane    No significant past surgical history        Hospital Course:    TODAY'S SUBJECTIVE & REVIEW OF SYMPTOMS:     Constitutional WNL   Cardio WNL   Resp WNL   GI WNL  Heme WNL  Endo WNL  Skin WNL  MSK WNL  Neuro WNL  Cognitive WNL  Psych WNL      MEDICATIONS  (STANDING):  amLODIPine   Tablet 5 milliGRAM(s) Oral daily  atorvastatin 20 milliGRAM(s) Oral at bedtime  chlorhexidine 4% Liquid 1 Application(s) Topical <User Schedule>  digoxin     Tablet 125 MICROGram(s) Oral daily  enalapril 20 milliGRAM(s) Oral daily  enalapril 10 milliGRAM(s) Oral at bedtime  furosemide    Tablet 20 milliGRAM(s) Oral daily  gabapentin 200 milliGRAM(s) Oral at bedtime  influenza  Vaccine (HIGH DOSE) 0.7 milliLiter(s) IntraMuscular once  metoprolol succinate  milliGRAM(s) Oral daily  pantoprazole    Tablet 40 milliGRAM(s) Oral before breakfast    MEDICATIONS  (PRN):  acetaminophen     Tablet .. 650 milliGRAM(s) Oral every 6 hours PRN Temp greater or equal to 38C (100.4F), Mild Pain (1 - 3)  baclofen 10 milliGRAM(s) Oral three times a day PRN pain  melatonin 3 milliGRAM(s) Oral at bedtime PRN Insomnia      FAMILY HISTORY:  No pertinent family history in first degree relatives        Allergies    No Known Allergies    Intolerances        SOCIAL HISTORY:    [  ] Etoh  [  ] Smoking  [  ] Substance abuse     Home Environment:  [  ] Home Alone  [ x ] Lives with Family sister    [  ] Home Health Aid    Dwelling:  [  ] Apartment  [x  ] Private House  [  ] Adult Home  [  ] Skilled Nursing Facility      [  ] Short Term  [  ] Long Term  [ x ] Stairs       Elevator [  ]    FUNCTIONAL STATUS PTA: (Check all that apply)  Ambulation: [   ]Independent    [ x ] Dependent     [  ] Non-Ambulatory  Assistive Device: [  ] SA Cane  [x  ]  Q Cane  [ x ] Walker  [  ]  Wheelchair  ADL : [  ] Independent  [  x]  Dependent       Vital Signs Last 24 Hrs  T(C): 36.7 (2021 05:27), Max: 36.8 (2021 21:12)  T(F): 98 (2021 05:), Max: 98.3 (2021 21:12)  HR: 87 (:) (71 - 87)  BP: 160/82 (2021 05:27) (136/93 - 167/80)  BP(mean): --  RR: 16 (:) (16 - 16)  SpO2: --      PHYSICAL EXAM: Alert & Oriented X 2  GENERAL: NAD, well-groomed, well-developed  HEAD:  Atraumatic, Normocephalic  EYES: EOMI, PERRLA, conjunctiva and sclera clear  NECK: Supple, No JVD, Normal thyroid  CHEST/LUNG: Clear to percussion bilaterally; No rales, rhonchi, wheezing, or rubs  HEART: Regular rate and rhythm; No murmurs, rubs, or gallops  ABDOMEN: Soft, Nontender, Nondistended; Bowel sounds present  EXTREMITIES:  2+ Peripheral Pulses, No clubbing, cyanosis, or edema    NERVOUS SYSTEM:  Cranial Nerves 2-12 intact [  ] Abnormal  [x  ]  ROM: WFL all extremities [  ]  Abnormal [ x ]  Motor Strength: WFL all extremities  [  ]  Abnormal [ x ]  Sensation: intact to light touch [  ] Abnormal [ x ]  Reflexes: Symmetric [  ]  Abnormal [ x ]    FUNCTIONAL STATUS:  Bed Mobility: Independent [  ]  Supervision [  ]  Needs Assistance [x ]  N/A [  ]  Transfers: Independent [  ]  Supervision [  ]  Needs Assistance [ x ]  N/A [  ]   Ambulation: Independent [  ]  Supervision [  ]  Needs Assistance [ x ]  N/A [  ]  ADL: Independent [  ] Requires Assistance [  ] N/A [  x]  SEE PT/OT IE NOTES    LABS:                        14.1   8.08  )-----------( 186      ( 2021 07:43 )             43.6         142  |  108  |  27<H>  ----------------------------<  101<H>  4.5   |  25  |  1.7<H>    Ca    8.8      2021 07:43  Mg     2.1         TPro  7.3  /  Alb  4.4  /  TBili  0.5  /  DBili  x   /  AST  17  /  ALT  26  /  AlkPhos  137<H>      PT/INR - ( 2021 08:14 )   PT: 23.00 sec;   INR: 2.01 ratio         PTT - ( 2021 08:14 )  PTT:36.4 sec  Urinalysis Basic - ( 2021 19:35 )    Color: Yellow / Appearance: Clear / S.020 / pH: x  Gluc: x / Ketone: Negative  / Bili: Negative / Urobili: 0.2 mg/dL   Blood: x / Protein: 30 mg/dL / Nitrite: Negative   Leuk Esterase: Negative / RBC: 3-5 /HPF / WBC x   Sq Epi: x / Non Sq Epi: Occasional /HPF / Bacteria: x        RADIOLOGY & ADDITIONAL STUDIES:    Assesment:

## 2021-11-29 NOTE — OCCUPATIONAL THERAPY INITIAL EVALUATION ADULT - PERTINENT HX OF CURRENT PROBLEM, REHAB EVAL
Admitted to the hospital for increased weakness over the past few days as reported by patients sister

## 2021-11-29 NOTE — OCCUPATIONAL THERAPY INITIAL EVALUATION ADULT - GENERAL OBSERVATIONS, REHAB EVAL
8:30-9:00 chart reviewed, ok to treat by Occupational Therapist as confirmed by RN Jami, Pt received semi- rocha in bed +heplock L arm in NAD. Pt. in agreement with OT IE

## 2021-11-29 NOTE — DISCHARGE NOTE PROVIDER - NSDCMRMEDTOKEN_GEN_ALL_CORE_FT
acetaminophen 325 mg oral tablet: 2 tab(s) orally every 6 hours, As needed, Temp greater or equal to 38C (100.4F), Mild Pain (1 - 3)  amLODIPine 5 mg oral tablet: 1 tab(s) orally once a day  atorvastatin 20 mg oral tablet: 1 tab(s) orally once a day (at bedtime)  baclofen 10 mg oral tablet: 1 cap(s) orally 3 times a day, As Needed  digoxin 125 mcg (0.125 mg) oral tablet: 1 tab(s) orally once a day  enalapril 10 mg oral tablet: 2 tab(s) orally QAM, 1 tab orally QHS  furosemide 20 mg oral tablet: 1 tab(s) orally once a day  gabapentin 100 mg oral capsule: 2 cap(s) orally once a day (at bedtime)  metoprolol succinate 50 mg oral tablet, extended release: 3 tab(s) orally once a day  pantoprazole 40 mg oral delayed release tablet: 1 tab(s) orally once a day (before a meal)  warfarin 2.5 mg oral tablet: 1 tab(s) orally once a day (at bedtime)

## 2021-11-29 NOTE — DISCHARGE NOTE PROVIDER - NSDCCPCAREPLAN_GEN_ALL_CORE_FT
PRINCIPAL DISCHARGE DIAGNOSIS  Diagnosis: Weakness  Assessment and Plan of Treatment: You will be transferred to [facility] to undergo rehabilitation services.  Continue with your medications as prescribed.  Outpatient FU with your PMD on discharge.  monitor INR.

## 2021-11-29 NOTE — DISCHARGE NOTE PROVIDER - CARE PROVIDER_API CALL
Carrie Jimenes  [address] [address]  Phone: [phone]  Fax: [phone]  Follow Up Time:

## 2021-11-29 NOTE — DISCHARGE NOTE NURSING/CASE MANAGEMENT/SOCIAL WORK - PATIENT PORTAL LINK FT
You can access the FollowMyHealth Patient Portal offered by [facility] by registering at the following website: [email or web address]. By joining [employer]’s FollowMyHealth portal, you will also be able to view your health information using other applications (apps) compatible with our system.

## 2021-11-29 NOTE — CHART NOTE - NSCHARTNOTEFT_GEN_A_CORE
Patient seen at bedside - resting comfortably.    Patient admitted for placement to [facility], no acute medical complaints. Awaiting placement to [facility].   PT and physiatry following case.   INR: 2.01 this AM - will order home dose of coumadin 2.5mg for this evening, with repeat INR for AM.     Patient without questions or concerns at this time.     Patient encouraged to contact PA with any further questions or concerns.
D/W Medicine attending, patient stable for discharge.  C/W Current rx.  Patient to be discharged to [facility]

## 2021-11-29 NOTE — PROGRESS NOTE ADULT - ASSESSMENT
A/P  Weakness, Decline in functional status  hx of CVA with right anny and expressive aphasia  - rehab evaluation noted, case discussed with therapist  - will need SA rehab    A fib  - rate controlled  - anticoagulated on Coumadin  - INR therapeutic    HTN  - continue home medications    HLD  - maintain on statin.      D/C to SNF today. Patient and sister in agreement with plan.

## 2021-11-29 NOTE — OCCUPATIONAL THERAPY INITIAL EVALUATION ADULT - ADDITIONAL COMMENTS
Pt. reports ambulating with quad cane and performing all ADLs independently prior to admission to the hospital

## 2021-12-03 DIAGNOSIS — I69.351 HEMIPLEGIA AND HEMIPARESIS FOLLOWING CEREBRAL INFARCTION AFFECTING RIGHT DOMINANT SIDE: ICD-10-CM

## 2021-12-03 DIAGNOSIS — M54.30 SCIATICA, UNSPECIFIED SIDE: ICD-10-CM

## 2021-12-03 DIAGNOSIS — Z86.718 PERSONAL HISTORY OF OTHER VENOUS THROMBOSIS AND EMBOLISM: ICD-10-CM

## 2021-12-03 DIAGNOSIS — Z74.1 NEED FOR ASSISTANCE WITH PERSONAL CARE: ICD-10-CM

## 2021-12-03 DIAGNOSIS — E78.5 HYPERLIPIDEMIA, UNSPECIFIED: ICD-10-CM

## 2021-12-03 DIAGNOSIS — I48.91 UNSPECIFIED ATRIAL FIBRILLATION: ICD-10-CM

## 2021-12-03 DIAGNOSIS — I10 ESSENTIAL (PRIMARY) HYPERTENSION: ICD-10-CM

## 2021-12-03 DIAGNOSIS — R53.1 WEAKNESS: ICD-10-CM

## 2021-12-03 DIAGNOSIS — I69.820 APHASIA FOLLOWING OTHER CEREBROVASCULAR DISEASE: ICD-10-CM

## 2022-01-25 NOTE — PATIENT PROFILE ADULT - FUNCTIONAL SCREEN CURRENT LEVEL: DRESSING, MLM
Reymundo Beaulieu MD  1/25/2022 2 = assistive person

## 2022-07-17 ENCOUNTER — INPATIENT (INPATIENT)
Facility: HOSPITAL | Age: 72
LOS: 2 days | Discharge: NURSING, REHAB OR LONG-TERM CARE | End: 2022-07-20
Attending: INTERNAL MEDICINE | Admitting: INTERNAL MEDICINE

## 2022-07-17 VITALS
SYSTOLIC BLOOD PRESSURE: 178 MMHG | DIASTOLIC BLOOD PRESSURE: 89 MMHG | RESPIRATION RATE: 18 BRPM | HEART RATE: 70 BPM | HEIGHT: 66 IN | TEMPERATURE: 98 F | WEIGHT: 199.96 LBS | OXYGEN SATURATION: 99 %

## 2022-07-17 PROBLEM — R53.1 WEAKNESS: Chronic | Status: ACTIVE | Noted: 2021-11-27

## 2022-07-17 PROBLEM — I63.9 CEREBRAL INFARCTION, UNSPECIFIED: Chronic | Status: ACTIVE | Noted: 2021-11-27

## 2022-07-17 LAB
ALBUMIN SERPL ELPH-MCNC: 3.8 G/DL — SIGNIFICANT CHANGE UP (ref 3.5–5.2)
ALP SERPL-CCNC: 125 U/L — HIGH (ref 30–115)
ALT FLD-CCNC: 12 U/L — SIGNIFICANT CHANGE UP (ref 0–41)
ANION GAP SERPL CALC-SCNC: 14 MMOL/L — SIGNIFICANT CHANGE UP (ref 7–14)
APPEARANCE UR: CLEAR — SIGNIFICANT CHANGE UP
APTT BLD: 34 SEC — SIGNIFICANT CHANGE UP (ref 27–39.2)
AST SERPL-CCNC: 15 U/L — SIGNIFICANT CHANGE UP (ref 0–41)
BACTERIA # UR AUTO: NEGATIVE — SIGNIFICANT CHANGE UP
BASOPHILS # BLD AUTO: 0.05 K/UL — SIGNIFICANT CHANGE UP (ref 0–0.2)
BASOPHILS NFR BLD AUTO: 0.8 % — SIGNIFICANT CHANGE UP (ref 0–1)
BILIRUB SERPL-MCNC: 0.8 MG/DL — SIGNIFICANT CHANGE UP (ref 0.2–1.2)
BILIRUB UR-MCNC: NEGATIVE — SIGNIFICANT CHANGE UP
BUN SERPL-MCNC: 27 MG/DL — HIGH (ref 10–20)
CALCIUM SERPL-MCNC: 8.8 MG/DL — SIGNIFICANT CHANGE UP (ref 8.5–10.1)
CHLORIDE SERPL-SCNC: 105 MMOL/L — SIGNIFICANT CHANGE UP (ref 98–110)
CO2 SERPL-SCNC: 21 MMOL/L — SIGNIFICANT CHANGE UP (ref 17–32)
COLOR SPEC: SIGNIFICANT CHANGE UP
CREAT SERPL-MCNC: 1.7 MG/DL — HIGH (ref 0.7–1.5)
DIFF PNL FLD: ABNORMAL
DIGOXIN SERPL-MCNC: 1.1 NG/ML — SIGNIFICANT CHANGE UP (ref 0.8–2)
EGFR: 43 ML/MIN/1.73M2 — LOW
EOSINOPHIL # BLD AUTO: 0.15 K/UL — SIGNIFICANT CHANGE UP (ref 0–0.7)
EOSINOPHIL NFR BLD AUTO: 2.3 % — SIGNIFICANT CHANGE UP (ref 0–8)
EPI CELLS # UR: 1 /HPF — SIGNIFICANT CHANGE UP (ref 0–5)
ETHANOL SERPL-MCNC: <10 MG/DL — SIGNIFICANT CHANGE UP
GLUCOSE SERPL-MCNC: 104 MG/DL — HIGH (ref 70–99)
GLUCOSE UR QL: NEGATIVE — SIGNIFICANT CHANGE UP
HCT VFR BLD CALC: 38.6 % — LOW (ref 42–52)
HGB BLD-MCNC: 12.3 G/DL — LOW (ref 14–18)
HYALINE CASTS # UR AUTO: 0 /LPF — SIGNIFICANT CHANGE UP (ref 0–7)
IMM GRANULOCYTES NFR BLD AUTO: 0.6 % — HIGH (ref 0.1–0.3)
INR BLD: 1.85 RATIO — HIGH (ref 0.65–1.3)
KETONES UR-MCNC: NEGATIVE — SIGNIFICANT CHANGE UP
LACTATE SERPL-SCNC: 1.6 MMOL/L — SIGNIFICANT CHANGE UP (ref 0.7–2)
LEUKOCYTE ESTERASE UR-ACNC: NEGATIVE — SIGNIFICANT CHANGE UP
LIDOCAIN IGE QN: 26 U/L — SIGNIFICANT CHANGE UP (ref 7–60)
LYMPHOCYTES # BLD AUTO: 1.31 K/UL — SIGNIFICANT CHANGE UP (ref 1.2–3.4)
LYMPHOCYTES # BLD AUTO: 20.5 % — SIGNIFICANT CHANGE UP (ref 20.5–51.1)
MCHC RBC-ENTMCNC: 27.1 PG — SIGNIFICANT CHANGE UP (ref 27–31)
MCHC RBC-ENTMCNC: 31.9 G/DL — LOW (ref 32–37)
MCV RBC AUTO: 85 FL — SIGNIFICANT CHANGE UP (ref 80–94)
MONOCYTES # BLD AUTO: 0.4 K/UL — SIGNIFICANT CHANGE UP (ref 0.1–0.6)
MONOCYTES NFR BLD AUTO: 6.3 % — SIGNIFICANT CHANGE UP (ref 1.7–9.3)
NEUTROPHILS # BLD AUTO: 4.45 K/UL — SIGNIFICANT CHANGE UP (ref 1.4–6.5)
NEUTROPHILS NFR BLD AUTO: 69.5 % — SIGNIFICANT CHANGE UP (ref 42.2–75.2)
NITRITE UR-MCNC: NEGATIVE — SIGNIFICANT CHANGE UP
NRBC # BLD: 0 /100 WBCS — SIGNIFICANT CHANGE UP (ref 0–0)
PH UR: 7 — SIGNIFICANT CHANGE UP (ref 5–8)
PLATELET # BLD AUTO: 176 K/UL — SIGNIFICANT CHANGE UP (ref 130–400)
POTASSIUM SERPL-MCNC: 4.1 MMOL/L — SIGNIFICANT CHANGE UP (ref 3.5–5)
POTASSIUM SERPL-SCNC: 4.1 MMOL/L — SIGNIFICANT CHANGE UP (ref 3.5–5)
PROT SERPL-MCNC: 6.9 G/DL — SIGNIFICANT CHANGE UP (ref 6–8)
PROT UR-MCNC: ABNORMAL
PROTHROM AB SERPL-ACNC: 21.1 SEC — HIGH (ref 9.95–12.87)
RBC # BLD: 4.54 M/UL — LOW (ref 4.7–6.1)
RBC # FLD: 14.8 % — HIGH (ref 11.5–14.5)
RBC CASTS # UR COMP ASSIST: 3 /HPF — SIGNIFICANT CHANGE UP (ref 0–4)
SARS-COV-2 RNA SPEC QL NAA+PROBE: SIGNIFICANT CHANGE UP
SODIUM SERPL-SCNC: 140 MMOL/L — SIGNIFICANT CHANGE UP (ref 135–146)
SP GR SPEC: 1.01 — SIGNIFICANT CHANGE UP (ref 1.01–1.03)
TROPONIN T SERPL-MCNC: <0.01 NG/ML — SIGNIFICANT CHANGE UP
UROBILINOGEN FLD QL: SIGNIFICANT CHANGE UP
WBC # BLD: 6.4 K/UL — SIGNIFICANT CHANGE UP (ref 4.8–10.8)
WBC # FLD AUTO: 6.4 K/UL — SIGNIFICANT CHANGE UP (ref 4.8–10.8)
WBC UR QL: 1 /HPF — SIGNIFICANT CHANGE UP (ref 0–5)

## 2022-07-17 PROCEDURE — 71260 CT THORAX DX C+: CPT | Mod: 26,MA

## 2022-07-17 PROCEDURE — 72125 CT NECK SPINE W/O DYE: CPT | Mod: 26,MA

## 2022-07-17 PROCEDURE — 12002 RPR S/N/AX/GEN/TRNK2.6-7.5CM: CPT | Mod: GC

## 2022-07-17 PROCEDURE — 70450 CT HEAD/BRAIN W/O DYE: CPT | Mod: 26,MA

## 2022-07-17 PROCEDURE — 72170 X-RAY EXAM OF PELVIS: CPT | Mod: 26

## 2022-07-17 PROCEDURE — 74177 CT ABD & PELVIS W/CONTRAST: CPT | Mod: 26,MA

## 2022-07-17 PROCEDURE — 71045 X-RAY EXAM CHEST 1 VIEW: CPT | Mod: 26

## 2022-07-17 PROCEDURE — 99285 EMERGENCY DEPT VISIT HI MDM: CPT | Mod: 25,GC

## 2022-07-17 PROCEDURE — 93010 ELECTROCARDIOGRAM REPORT: CPT

## 2022-07-17 RX ORDER — BACLOFEN 100 %
1 POWDER (GRAM) MISCELLANEOUS
Qty: 0 | Refills: 0 | DISCHARGE

## 2022-07-17 RX ORDER — METOPROLOL TARTRATE 50 MG
150 TABLET ORAL DAILY
Refills: 0 | Status: DISCONTINUED | OUTPATIENT
Start: 2022-07-17 | End: 2022-07-20

## 2022-07-17 RX ORDER — DIGOXIN 250 MCG
125 TABLET ORAL DAILY
Refills: 0 | Status: DISCONTINUED | OUTPATIENT
Start: 2022-07-17 | End: 2022-07-20

## 2022-07-17 RX ORDER — AMLODIPINE BESYLATE 2.5 MG/1
5 TABLET ORAL DAILY
Refills: 0 | Status: DISCONTINUED | OUTPATIENT
Start: 2022-07-17 | End: 2022-07-20

## 2022-07-17 RX ORDER — ATORVASTATIN CALCIUM 80 MG/1
20 TABLET, FILM COATED ORAL AT BEDTIME
Refills: 0 | Status: DISCONTINUED | OUTPATIENT
Start: 2022-07-17 | End: 2022-07-20

## 2022-07-17 RX ORDER — HEPARIN SODIUM 5000 [USP'U]/ML
5000 INJECTION INTRAVENOUS; SUBCUTANEOUS EVERY 12 HOURS
Refills: 0 | Status: DISCONTINUED | OUTPATIENT
Start: 2022-07-17 | End: 2022-07-20

## 2022-07-17 RX ORDER — FUROSEMIDE 40 MG
20 TABLET ORAL DAILY
Refills: 0 | Status: DISCONTINUED | OUTPATIENT
Start: 2022-07-17 | End: 2022-07-17

## 2022-07-17 RX ORDER — SODIUM CHLORIDE 9 MG/ML
1000 INJECTION INTRAMUSCULAR; INTRAVENOUS; SUBCUTANEOUS ONCE
Refills: 0 | Status: COMPLETED | OUTPATIENT
Start: 2022-07-17 | End: 2022-07-17

## 2022-07-17 RX ORDER — GABAPENTIN 400 MG/1
200 CAPSULE ORAL DAILY
Refills: 0 | Status: DISCONTINUED | OUTPATIENT
Start: 2022-07-17 | End: 2022-07-20

## 2022-07-17 RX ORDER — TETANUS TOXOID, REDUCED DIPHTHERIA TOXOID AND ACELLULAR PERTUSSIS VACCINE, ADSORBED 5; 2.5; 8; 8; 2.5 [IU]/.5ML; [IU]/.5ML; UG/.5ML; UG/.5ML; UG/.5ML
0.5 SUSPENSION INTRAMUSCULAR ONCE
Refills: 0 | Status: COMPLETED | OUTPATIENT
Start: 2022-07-17 | End: 2022-07-17

## 2022-07-17 RX ADMIN — HEPARIN SODIUM 5000 UNIT(S): 5000 INJECTION INTRAVENOUS; SUBCUTANEOUS at 18:27

## 2022-07-17 RX ADMIN — TETANUS TOXOID, REDUCED DIPHTHERIA TOXOID AND ACELLULAR PERTUSSIS VACCINE, ADSORBED 0.5 MILLILITER(S): 5; 2.5; 8; 8; 2.5 SUSPENSION INTRAMUSCULAR at 09:05

## 2022-07-17 RX ADMIN — SODIUM CHLORIDE 1000 MILLILITER(S): 9 INJECTION INTRAMUSCULAR; INTRAVENOUS; SUBCUTANEOUS at 07:56

## 2022-07-17 RX ADMIN — ATORVASTATIN CALCIUM 20 MILLIGRAM(S): 80 TABLET, FILM COATED ORAL at 21:32

## 2022-07-17 NOTE — ED PROVIDER NOTE - ATTENDING CONTRIBUTION TO CARE
personally evaluated the patient. I reviewed the Resident’s or Physician Assistant’s note (as assigned above), and agree with the findings and plan except as documented in my note.   89-year-old man past medical history significant for hypertension, dyslipidemia, BPH, CVA with residual right-sided weakness, on Coumadin, status post fall at home.  Patient lives with his daughter and daughter daughter heard the patient fall from upstairs and found the patient on the ground.  As per patient he tripped and fell, no symptoms preceding the fall.  Positive head trauma.  No LOC.  Positive scalp laceration.  Patient denies any other injuries.  Patient denies neck or back pain.  Patient denies chest pain, shortness of breath.  Patient denies abdominal pain.  Patient was not ambulatory after the fall.  Patient usually walks with a walker.  Patient with no recent illness, fever, chills, cough or URI symptoms.  Vitals noted. ALERT OX3 NAD GCS-15. + 3 CM L FRONTAL SCALP LACERATION. PERRL, EOMI. NO MIDLINE C SPINE TENDERNESS. LUNGS CLEAR B/L. CHEST NONTENDER, NO CREPITUS. RRR. ABD- SOFT NONTENDER. PELVIS STABLE NONTENDER. + LARGE B/L INGUINAL HERNIAS. BACK NONTENDER. NO SPINE TENDERNESS. + R SIDED WEAKNESS, RUE>RLE. + RLE EDEMA AND CHRONIC VENOUS STASIS CHANGES.

## 2022-07-17 NOTE — PATIENT PROFILE ADULT - FALL HARM RISK - HARM RISK INTERVENTIONS
Assistance with ambulation/Assistance OOB with selected safe patient handling equipment/Communicate Risk of Fall with Harm to all staff/Discuss with provider need for PT consult/Monitor gait and stability/Reinforce activity limits and safety measures with patient and family/Tailored Fall Risk Interventions/Visual Cue: Yellow wristband and red socks/Bed in lowest position, wheels locked, appropriate side rails in place/Call bell, personal items and telephone in reach/Instruct patient to call for assistance before getting out of bed or chair/Non-slip footwear when patient is out of bed/[place] to call system/Physically safe environment - no spills, clutter or unnecessary equipment/Purposeful Proactive Rounding/Room/bathroom lighting operational, light cord in reach

## 2022-07-17 NOTE — ED PROVIDER NOTE - PHYSICAL EXAMINATION
Vital Signs: I have reviewed the initial vital signs.  Constitutional: Well-nourished, appears stated age, in no acute distress.  HEENT: Airway patent, moist MM, no erythema/swelling/deformity of oral structures. Laceration to forehead with bleeding controlled with bandages, otherwise atraumatic. EOMI, PERRLA.  CV: Regular rate, regular rhythm, well-perfused extremities, 2+ pulses bilaterally. DP/PT and radial pulses equal.  Lungs: CTAB, no increased WOB.  ABD: NTND, no guarding or rebound, no pulsatile masses, no hernias, no flank pain.   MSK: Neck supple, nontender, normal ROM, no stepoff. Chest nontender. Back nontender in TLS spine or to bilateral bony structures. Extremities nontender, normal ROM, no deformities.   INTEG: Skin warm, dry. Chronic venous stasis ulcer to BLE, r>l.   NEURO: A&Ox3, right-sided deficits to RUE/RLE, moving left-sided extremities normally. normal speech.   PSYCH: Calm, cooperative, normal affect and interaction.

## 2022-07-17 NOTE — CHART NOTE - NSCHARTNOTEFT_GEN_A_CORE
Pt had bp of 182/85. was asymptommatic. did not take bp meds today. asked rn to give him his amlodipine and recheck in an hour

## 2022-07-17 NOTE — ED ADULT TRIAGE NOTE - HAVE YOU HAD A FIRST COVID-19 BOOSTER?
Detail Level: Generalized Detail Level: Detailed Patient Specific Counseling (Will Not Stick From Patient To Patient): - Noted on HPI Detail Level: Zone Yes

## 2022-07-17 NOTE — H&P ADULT - ATTENDING COMMENTS
Chart reviewed, patient examined. Pertinent results reviewed.  Case discussed with HO; specialist f/u reviewed  HD#1;        PARISH, 15 years s/p CVA- had a mechanical fall and hit his Left temple; he is on AC; his CT during his trauma evaluation negative for IC bleeding;  Today the patient feels better. He was seen with his sister /caretaker at his bedside. He has had gait difficulties ever since his CVA; He has had a few falls, but usually doesn't come to the hospital;     Agree w A@P; PT was consulted, but evaluation is still pending;  The patient remains on AC Rx; Continued Rx must be evaluated if he has continued falls.   Get EKG, and LE venous duplex.  See progress note

## 2022-07-17 NOTE — H&P ADULT - HISTORY OF PRESENT ILLNESS
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 History was obtained from patient and his sister. Patient was walking and tripped over his feet causing him to fall down and hit his head on the ground. Patient's sister states there was blood coming from his head which prompted her to call 911. Patient was unable to get up on his own and required help to stand back up. Last dose of Coumadin taken last night. In the ED, patient HD stable, ABCs intact, GCS 15, A&Ox2 (at baseline). Pt complains of head pain. Denies SOB, CP, N/V. External signs of trauma include a ~3.5cm left frontal laceration, repaired in the ED.     ED Vitals T(F): 97.7, HR: 70, BP: 178/89, RR: 18, SpO2: 99%  Labs significant for Hb 12.3, HCT 38.6, WBC 6.40, . Trops negx1, UA neg, lactate 1.6, Cr 1.7   140  |  105  |  27<H>  ----------------------------<  104<H>  4.1   |  21  |  1.7<H>       This is a 70 y/o M w/ PMHx of HTN, HLD, Afib on coumadin and CVA x 15 years ago with residual Rt sided weakness presented to the ED s/p fall, +HT, -LOC, +AC (Coumadin). History was obtained from patient and his sister. Patient was walking and tripped over his feet causing him to fall down and hit his head on the ground. Patient's sister states there was blood coming from his head which prompted her to call 911. Patient was unable to get up on his own and required help to stand back up. Last dose of Coumadin taken last night. In the ED, patient HD stable, ABCs intact, GCS 15, A&Ox2 (at baseline). Pt complains of head pain. Denies SOB, CP, N/V. External signs of trauma include a ~3.5cm left frontal laceration, repaired in the ED.     ED Vitals T(F): 97.7, HR: 70, BP: 178/89, RR: 18, SpO2: 99%  Labs significant for Hb 12.3, HCT 38.6, WBC 6.40, . Trops negx1, UA neg, lactate 1.6, Cr 1.7   140  |  105  |  27<H>  ----------------------------<  104<H>  4.1   |  21  |  1.7<H>

## 2022-07-17 NOTE — ED PROVIDER NOTE - PROGRESS NOTE DETAILS
TJY: bandages to head removed, revealing a 3-4cm lac to the frontal scalp, bleeding controlled. Cleaned with hydrogen peroxide and copious irrigation, stapled. TJY: trauma ok with medicine admission, will do tertiary survey during admission. Attempted to ambulate patient with assistance.  Patient unable to stand from sitting position.  Patient unable to take 1-2 steps.  Patient cleared by trauma.  Will admit to medicine.

## 2022-07-17 NOTE — H&P ADULT - NSHPPHYSICALEXAM_GEN_ALL_CORE
LOS:     VITALS:   T(C): 36.5 (07-17-22 @ 06:45), Max: 36.5 (07-17-22 @ 06:45)  HR: 70 (07-17-22 @ 06:45) (70 - 70)  BP: 178/89 (07-17-22 @ 06:45) (178/89 - 178/89)  RR: 18 (07-17-22 @ 06:45) (18 - 18)  SpO2: 99% (07-17-22 @ 06:45) (99% - 99%)    GENERAL: NAD, lying in bed comfortably  HEAD:  ~3.5cm left frontal laceration  EYES: EOMI, PERRLA, conjunctiva and sclera clear  ENT: Moist mucous membranes  NECK: Supple, No JVD  CHEST/LUNG: Clear to auscultation bilaterally; No rales, rhonchi, wheezing, or rubs. Unlabored respirations  HEART: Regular rate and rhythm; No murmurs, rubs, or gallops  ABDOMEN: BSx4; Soft, nontender, nondistended  EXTREMITIES:  B/L LE venous statis   NERVOUS SYSTEM:  A&Ox2, Rt sided weakness

## 2022-07-17 NOTE — ED PROVIDER NOTE - CLINICAL SUMMARY MEDICAL DECISION MAKING FREE TEXT BOX
89-year-old man past medical history as documented status post fall at home.  Positive head trauma.  Patient is on anticoagulation.  Trauma alert activated on arrival.  Frontal scalp laceration repaired.  All diagnostic testing with no acute traumatic injury.  Attempted to ambulate patient and patient unable to stand or take 1-2 steps.  Patient admitted to medicine.

## 2022-07-17 NOTE — ED PROVIDER NOTE - OBJECTIVE STATEMENT
70yo male PMHx of CVA (15 years ago, on warfarin) presenting after a fall about 1 hour PTA when he was walking, "tripped over his feet", and struck his head on the floor. There was no LOC, at present no c/o headache, changes to vision or sensation, numbness or weakness. There was no precipitant CP/SOB, and not presently. +mild hip discomfort, no other complaints.

## 2022-07-17 NOTE — CONSULT NOTE ADULT - ASSESSMENT
ASSESSMENT:  71y Male  w/ PMHx of *** seen as (Code Trauma / Trauma Alert / Trauma Consult) s/p ****** with complaint of *** , external signs of trauma include *** . Trauma assessment in ED: ABCs intact , GCS 15 , AAOx3,  GREEN.     Injuries identified:   -   -   -     PLAN:   - Trauma Labs: (CBC, BMP, Coags, T&S, UA, EtOH level)  Additional studies:  EKG  Utox    Trauma Imaging to include the following:  - CXR, Pelvic Xray  - CT Head,  CT C-spine, CT Max/Face, CT Chest, CT Abd/Pelvis  - Extremity films: None    Additional consultations:  - Neurosurgery  - Orthopedics  - OMFS  - PT/Rehab/SW  - Hospitalist/Medicine     Disposition pending results of above labs and imaging  Above plan discussed with Trauma attending,  ***  , patient, patient family, and ED team  --------------------------------------------------------------------------------------  07-17-22 @ 07:51 ASSESSMENT:  71yM w/ PMHx of HTN, HLD and CVA x 15 years ago with residual Rt sided weakness seen as Trauma Alert s/p fall, +HT, -LOC, +AC (Coumadin). Patient accompanied by his sister who states he was walking and tripped over his feet causing him to fall down and hit his head on the ground. Patient's sister states there was blood coming from his head which prompted her to call 911. Patient was unable to get up on his own and required help to stand back up. Last dose of Coumadin taken last night. In the ED, patient HD stable, ABCs intact, GCS 15, A&Ox3. Pt complains of head pain. Denies SOB, CP, N/V. External signs of trauma include a ~3.5cm left frontal laceration, repaired in the ED.     Injuries identified:   -   -   -     PLAN:   - Trauma Labs: (CBC, BMP, Coags, T&S, UA, EtOH level)  Additional studies:  EKG  Utox    Trauma Imaging to include the following:  - CXR, Pelvic Xray  - CT Head,  CT C-spine, CT Max/Face, CT Chest, CT Abd/Pelvis  - Extremity films: None      Disposition pending results of above labs and imaging  Above plan discussed with Trauma attending,  ***  , patient, patient family, and ED team  --------------------------------------------------------------------------------------  07-17-22 @ 07:51 ASSESSMENT:  71yM w/ PMHx of HTN, HLD and CVA x 15 years ago with residual Rt sided weakness seen as Trauma Alert s/p fall, +HT, -LOC, +AC (Coumadin). Patient accompanied by his sister who states he was walking and tripped over his feet causing him to fall down and hit his head on the ground. Patient's sister states there was blood coming from his head which prompted her to call 911. Patient was unable to get up on his own and required help to stand back up. Last dose of Coumadin taken last night. In the ED, patient HD stable, ABCs intact, GCS 15, A&Ox3. Pt complains of head pain. Denies SOB, CP, N/V. External signs of trauma include a ~3.5cm left frontal laceration, repaired in the ED.     Injuries identified:   - L frontal scalp swelling    PLAN:   - will perform Tertiary trauma survey 7/18 if admitted  - no acute traumatic injuries, disposition as per ED    Disposition pending results of above labs and imaging  Above plan discussed with Trauma attending, Dr. Zavala, patient, patient family, and ED team  --------------------------------------------------------------------------------------  07-17-22 @ 07:51    Senior Note  I have personally examined and evaluated the patient.  I agree with the above plan and note, and I have edited where appropriate  External signs of injury on exam: left scalp lac  CTs reviewed, as above. No acute traumatic findings.  No acute trauma surgical intervention  Plain film wet reads as per ED  Dispo as per ED  If pt admitted, trauma team to perform TTS in AM  Surgical Attending aware and agrees with plan

## 2022-07-17 NOTE — PATIENT PROFILE ADULT - NSPROPOAPRESSUREINJURY_GEN_A_NUR
Spoke to patient about normal AFP screen. She states her understanding and denies any questions at this time.    no

## 2022-07-17 NOTE — ED PROVIDER NOTE - NS ED ROS FT
Constitutional:  No fever, chills,   Eyes:  No eye pain or visual changes  ENMT: No neck pain or stiffness  Cardiac:  No chest pain or palpitations  Respiratory:  No cough or respiratory distress  GI:  No nausea, vomiting, diarrhea or abdominal pain  :  No dysuria, frequency, or hematuria  MS:  No back or joint pain  Neuro:  No headache. No numbness, weakness, or tingling  Skin:  No skin rash or pruritus.   Except as documented in the HPI,  all other systems are negative

## 2022-07-17 NOTE — CONSULT NOTE ADULT - SUBJECTIVE AND OBJECTIVE BOX
TRAUMA ACTIVATION LEVEL:  ALERT  ACTIVATED BY: ED  INTUBATED: NO      MECHANISM OF INJURY:  Fall	      GCS: 15 	E: 4	V: 5	M: 6    HPI:    71yM w/ PMHx of *** seen as Trauma Alert s/p fall.  Trauma assessment in ED: ABCs intact , GCS 15 , AAOx3.    PAST MEDICAL & SURGICAL HISTORY:  Sciatica  HTN (hypertension)  Afib  Right sided weakness  CVA (cerebrovascular accident)  CVA 12 years ago with right sided weakness, uses four prong cane    No significant past surgical history    Allergies  No Known Allergies    Intolerances      Home Medications:  acetaminophen 325 mg oral tablet: 2 tab(s) orally every 6 hours, As needed, Temp greater or equal to 38C (100.4F), Mild Pain (1 - 3) (2021 10:31)  amLODIPine 5 mg oral tablet: 1 tab(s) orally once a day (2021 15:50)  atorvastatin 20 mg oral tablet: 1 tab(s) orally once a day (at bedtime) (2021 15:50)  baclofen 10 mg oral tablet: 1 cap(s) orally 3 times a day, As Needed (2021 15:50)  digoxin 125 mcg (0.125 mg) oral tablet: 1 tab(s) orally once a day (2021 15:50)  enalapril 10 mg oral tablet: 2 tab(s) orally QAM, 1 tab orally QHS (2021 15:50)  furosemide 20 mg oral tablet: 1 tab(s) orally once a day (2021 15:50)  gabapentin 100 mg oral capsule: 2 cap(s) orally once a day (at bedtime) (2021 15:50)  metoprolol succinate 50 mg oral tablet, extended release: 3 tab(s) orally once a day (2021 10:31)  pantoprazole 40 mg oral delayed release tablet: 1 tab(s) orally once a day (before a meal) (2021 10:31)  warfarin 2.5 mg oral tablet: 1 tab(s) orally once a day (at bedtime) (2021 15:50)      ROS: 10-system review is otherwise negative except HPI above.      Primary Survey:    A - airway intact  B - bilateral breath sounds and good chest rise  C - palpable pulses in all extremities  D - GCS 15 on arrival, GREEN  Exposure obtained    Vital Signs Last 24 Hrs  T(C): 36.5 (2022 06:45), Max: 36.5 (2022 06:45)  T(F): 97.7 (2022 06:45), Max: 97.7 (2022 06:45)  HR: 70 (2022 06:45) (70 - 70)  BP: 178/89 (2022 06:45) (178/89 - 178/89)  BP(mean): --  RR: 18 (2022 06:45) (18 - 18)  SpO2: 99% (2022 06:45) (99% - 99%)    Parameters below as of 2022 06:45  Patient On (Oxygen Delivery Method): room air      Secondary Survey:   General: NAD  HEENT: Normocephalic, atraumatic, EOMI, PEERLA. no scalp lacerations   Neck: Soft, midline trachea. no c-spine tenderness  Chest: No chest wall tenderness, no subcutaneous emphysema   Cardiac: S1, S2, RRR  Respiratory: Bilateral breath sounds, clear and equal bilaterally  Abdomen: Soft, non-distended, non-tender, no rebound, no guarding.  Groin: Normal appearing, pelvis stable   Ext:  Moving b/l upper and lower extremities. Palpable Radial b/l UE, b/l DP palpable in LE.   Back: No T/L/S spine tenderness, No palpable runoff/stepoff/deformity      FAST: *****/Negative    Labs:             12.3   6.40  )-----------( 176      ( 2022 07:20 )             38.6       Auto Neutrophil %: 69.5 % (22 @ 07:20)  Auto Immature Granulocyte %: 0.6 % (22 @ 07:20)    Urinalysis Basic - ( 2022 07:25 )    Color: Light Yellow / Appearance: Clear / S.012 / pH: x  Gluc: x / Ketone: Negative  / Bili: Negative / Urobili: <2 mg/dL   Blood: x / Protein: 30 mg/dL / Nitrite: Negative   Leuk Esterase: Negative / RBC: 3 /HPF / WBC 1 /HPF   Sq Epi: x / Non Sq Epi: 1 /HPF / Bacteria: Negative    RADIOLOGY & ADDITIONAL STUDIES:  ---------------------------------------------------------------------------------------   TRAUMA ACTIVATION LEVEL:  ALERT  ACTIVATED BY: ED  INTUBATED: NO      MECHANISM OF INJURY:  Fall	      GCS: 15 	E: 4	V: 5	M: 6    HPI: 71yM w/ PMHx of HTN, HLD and CVA x 15 years ago with residual Rt sided weakness seen as Trauma Alert s/p fall, +HT, -LOC, +AC (Coumadin). Patient accompanied by his sister who states he was walking and tripped over his feet causing him to fall down and hit his head on the ground. Patient's sister states there was blood coming from his head which prompted her to call 911. Patient was unable to get up on his own and required help to stand back up. Last dose of Coumadin taken last night. In the ED, patient HD stable, ABCs intact, GCS 15, A&Ox3. Pt complains of head pain. Denies SOB, CP, N/V. External signs of trauma include a ~3.5cm left frontal laceration, repaired in the ED.     PAST MEDICAL & SURGICAL HISTORY:  Sciatica  HTN (hypertension)  Afib  Right sided weakness  CVA (cerebrovascular accident)  CVA 12 years ago with right sided weakness, uses four prong cane    No significant past surgical history    Allergies  No Known Allergies    Intolerances      Home Medications:  acetaminophen 325 mg oral tablet: 2 tab(s) orally every 6 hours, As needed, Temp greater or equal to 38C (100.4F), Mild Pain (1 - 3) (2021 10:31)  amLODIPine 5 mg oral tablet: 1 tab(s) orally once a day (2021 15:50)  atorvastatin 20 mg oral tablet: 1 tab(s) orally once a day (at bedtime) (2021 15:50)  baclofen 10 mg oral tablet: 1 cap(s) orally 3 times a day, As Needed (2021 15:50)  digoxin 125 mcg (0.125 mg) oral tablet: 1 tab(s) orally once a day (2021 15:50)  enalapril 10 mg oral tablet: 2 tab(s) orally QAM, 1 tab orally QHS (2021 15:50)  furosemide 20 mg oral tablet: 1 tab(s) orally once a day (2021 15:50)  gabapentin 100 mg oral capsule: 2 cap(s) orally once a day (at bedtime) (2021 15:50)  metoprolol succinate 50 mg oral tablet, extended release: 3 tab(s) orally once a day (2021 10:31)  pantoprazole 40 mg oral delayed release tablet: 1 tab(s) orally once a day (before a meal) (2021 10:31)  warfarin 2.5 mg oral tablet: 1 tab(s) orally once a day (at bedtime) (2021 15:50)      ROS: 10-system review is otherwise negative except HPI above.      Primary Survey:    A - airway intact  B - bilateral breath sounds and good chest rise  C - palpable pulses in all extremities  D - GCS 15 on arrival, GREEN  Exposure obtained    Vital Signs Last 24 Hrs  T(C): 36.5 (2022 06:45), Max: 36.5 (2022 06:45)  T(F): 97.7 (2022 06:45), Max: 97.7 (2022 06:45)  HR: 70 (2022 06:45) (70 - 70)  BP: 178/89 (2022 06:45) (178/89 - 178/89)  BP(mean): --  RR: 18 (2022 06:45) (18 - 18)  SpO2: 99% (2022 06:45) (99% - 99%)    Parameters below as of 2022 06:45  Patient On (Oxygen Delivery Method): room air      Secondary Survey:   General: NAD  HEENT: Normocephalic, atraumatic, EOMI, PEERLA. +left frontal ~3.5cm laceration, repaired in ED with staples   Neck: Soft, midline trachea. no c-spine tenderness  Chest: No chest wall tenderness, no subcutaneous emphysema   Cardiac: S1, S2  Respiratory: Bilateral breath sounds, clear and equal bilaterally  Abdomen: Soft, non-distended, non-tender, no rebound, no guarding.  Groin: Normal appearing, pelvis stable   Ext:  Moving b/l upper and lower extremities. RUE with residual weakness from prior CVA. Palpable Radial b/l UE. B/L chronic venous stasis skin changes present.  Back: No T/L/S spine tenderness, No palpable runoff/stepoff/deformity      Labs:             12.3   6.40  )-----------( 176      ( 2022 07:20 )             38.6       Auto Neutrophil %: 69.5 % (22 @ 07:20)  Auto Immature Granulocyte %: 0.6 % (22 @ 07:20)    Urinalysis Basic - ( 2022 07:25 )    Color: Light Yellow / Appearance: Clear / S.012 / pH: x  Gluc: x / Ketone: Negative  / Bili: Negative / Urobili: <2 mg/dL   Blood: x / Protein: 30 mg/dL / Nitrite: Negative   Leuk Esterase: Negative / RBC: 3 /HPF / WBC 1 /HPF   Sq Epi: x / Non Sq Epi: 1 /HPF / Bacteria: Negative        RADIOLOGY & ADDITIONAL STUDIES:  ---------------------------------------------------------------------------------------  < from: CT Head No Cont (22 @ 07:50) >  - No acute intracranial pathology.  - Stable moderate chronic microvascular ischemic changes.  - Small left frontal scalp swelling.    < from: CT Cervical Spine No Cont (22 @ 07:53) >  - No acute cervical spine fracture or subluxation.    < from: CT Chest w/ IV Cont (22 @ 08:20) >  - No evidence of acute traumatic injury within the chest, abdomen, or   pelvis.  - 4 mm right lower lobe solid nodule. In a high-risk patient, consider 12   months CT chest follow-up.       TRAUMA ACTIVATION LEVEL:  ALERT  ACTIVATED BY: ED  INTUBATED: NO      MECHANISM OF INJURY:  Fall	      GCS: 15 	E: 4	V: 5	M: 6    HPI: 71yM w/ PMHx of HTN, HLD and CVA x 15 years ago with residual Rt sided weakness seen as Trauma Alert s/p fall, +HT, -LOC, +AC (Coumadin). Patient accompanied by his sister who states he was walking and tripped over his feet causing him to fall down and hit his head on the ground. Patient's sister states there was blood coming from his head which prompted her to call 911. Patient was unable to get up on his own and required help to stand back up. Last dose of Coumadin taken last night. In the ED, patient HD stable, ABCs intact, GCS 15, A&Ox3. Pt complains of head pain. Denies SOB, CP, N/V. External signs of trauma include a ~3.5cm left frontal laceration, repaired in the ED.     PAST MEDICAL & SURGICAL HISTORY:  Sciatica  HTN (hypertension)  Afib  Right sided weakness  CVA (cerebrovascular accident)  CVA 12 years ago with right sided weakness, uses four prong cane    No significant past surgical history    Allergies  No Known Allergies    Intolerances    Home Medications:  acetaminophen 325 mg oral tablet: 2 tab(s) orally every 6 hours, As needed, Temp greater or equal to 38C (100.4F), Mild Pain (1 - 3) (2021 10:31)  amLODIPine 5 mg oral tablet: 1 tab(s) orally once a day (2021 15:50)  atorvastatin 20 mg oral tablet: 1 tab(s) orally once a day (at bedtime) (2021 15:50)  baclofen 10 mg oral tablet: 1 cap(s) orally 3 times a day, As Needed (2021 15:50)  digoxin 125 mcg (0.125 mg) oral tablet: 1 tab(s) orally once a day (2021 15:50)  enalapril 10 mg oral tablet: 2 tab(s) orally QAM, 1 tab orally QHS (2021 15:50)  furosemide 20 mg oral tablet: 1 tab(s) orally once a day (2021 15:50)  gabapentin 100 mg oral capsule: 2 cap(s) orally once a day (at bedtime) (2021 15:50)  metoprolol succinate 50 mg oral tablet, extended release: 3 tab(s) orally once a day (2021 10:31)  pantoprazole 40 mg oral delayed release tablet: 1 tab(s) orally once a day (before a meal) (2021 10:31)  warfarin 2.5 mg oral tablet: 1 tab(s) orally once a day (at bedtime) (2021 15:50)    ROS: 10-system review is otherwise negative except HPI above.      Primary Survey:    A - airway intact  B - bilateral breath sounds and good chest rise  C - palpable pulses in all extremities  D - GCS 15 on arrival, GREEN  Exposure obtained    Vital Signs Last 24 Hrs  T(C): 36.5 (2022 06:45), Max: 36.5 (2022 06:45)  T(F): 97.7 (2022 06:45), Max: 97.7 (2022 06:45)  HR: 70 (2022 06:45) (70 - 70)  BP: 178/89 (2022 06:45) (178/89 - 178/89)  BP(mean): --  RR: 18 (2022 06:45) (18 - 18)  SpO2: 99% (2022 06:45) (99% - 99%)    Parameters below as of 2022 06:45  Patient On (Oxygen Delivery Method): room air    Secondary Survey:   General: NAD  HEENT: Normocephalic, +left frontal ~3.5cm laceration, repaired in ED with staples   Neck: Soft, midline trachea. no c-spine tenderness  Chest: No chest wall tenderness, no subcutaneous emphysema   Cardiac: S1, S2  Respiratory: Bilateral breath sounds, clear and equal bilaterally  Abdomen: Soft, non-distended, non-tender, no rebound, no guarding.  Groin: Normal appearing, pelvis stable   Ext:  Moving b/l upper and lower extremities. RUE with residual weakness from prior CVA. Palpable Radial b/l UE. B/L chronic venous stasis skin changes present.  Back: No T/L/S spine tenderness, No palpable runoff/stepoff/deformity    Labs:                      12.3   6.40  )-----------( 176      ( 2022 07:20 )             38.6       140  |  105  |  27<H>  ----------------------------<  104<H>  4.1   |  21  |  1.7<H>    Ca    8.8      2022 07:20    TPro  6.9  /  Alb  3.8  /  TBili  0.8  /  DBili  x   /  AST  15  /  ALT  12  /  AlkPhos  125<H>      Lipase, Serum: 26 U/L (22 @ 07:20)    Lactate, Blood: 1.6 mmol/L (22 @ 07:20)    PT/INR - ( 2022 07:20 )   PT: 21.10 sec;   INR: 1.85 ratio    PTT - ( 2022 07:20 )  PTT:34.0 sec  CARDIAC MARKERS ( 2022 07:20 )  x     / <0.01 ng/mL / x     / x     / x        Alcohol, Blood: <10 mg/dL (22 @ 07:20)    Urinalysis Basic - ( 2022 07:25 )    Color: Light Yellow / Appearance: Clear / S.012 / pH: x  Gluc: x / Ketone: Negative  / Bili: Negative / Urobili: <2 mg/dL   Blood: x / Protein: 30 mg/dL / Nitrite: Negative   Leuk Esterase: Negative / RBC: 3 /HPF / WBC 1 /HPF   Sq Epi: x / Non Sq Epi: 1 /HPF / Bacteria: Negative    RADIOLOGY & ADDITIONAL STUDIES:  ---------------------------------------------------------------------------------------  < from: CT Head No Cont (22 @ 07:50) >  - No acute intracranial pathology.  - Stable moderate chronic microvascular ischemic changes.  - Small left frontal scalp swelling.    < from: CT Cervical Spine No Cont (22 @ 07:53) >  - No acute cervical spine fracture or subluxation.    < from: CT ABDOMEN AND PELVIS IC   CT Chest w/ IV Cont (22 @ 08:20) >  IMPRESSION:    1.  No definite evidence of acute traumatic injury within the chest,   abdomen, or pelvis.    2.  Large right inguinal scrotal hernia containing fat and multiple   partially imaged segments of non-obstructed bowel.    3.  Large left hydrocele.    4.  Dilated ascending thoracic aorta, measuring 4.5 cm the level of the   right main pulmonary artery.    5.  4 mm right lower lobe solid nodule. In a high-risk patient, consider   12 months CT chest follow-up.    --- End of Report ---    GAVINO FRAIRE MD; Resident Radiologist  This document has been electronically signed.  ASHLEY WESTFALL MD; Attending Radiologist  This document has been electronically signed. 2022 10:43AM  < end of copied text >

## 2022-07-17 NOTE — H&P ADULT - NSHPLABSRESULTS_GEN_ALL_CORE
< from: CT Head No Cont (07.17.22 @ 07:50) >    IMPRESSION:    No acute intracranialpathology.    Stable moderate chronic microvascular ischemic changes.    Small left frontal scalp swelling.    < end of copied text >    < from: CT Cervical Spine No Cont (07.17.22 @ 07:53) >    IMPRESSION:    No acute cervical spine fracture or subluxation.    < end of copied text >      < from: CT Chest w/ IV Cont (07.17.22 @ 08:20) >    IMPRESSION:    1.  No definite evidence of acute traumatic injury within the chest,   abdomen, or pelvis.    2.  Large right inguinal scrotal hernia containing fat and multiple   partially imaged segments of nonobstructed bowel.    3.  Large left hydrocele.    4.  Dilated ascending thoracic aorta, measuring 4.5 cm the level of the   right main pulmonary artery.    5.  4 mm right lower lobe solid nodule. In a high-risk patient, consider   12 months CT chest follow-up.    --- End of Report ---    < end of copied text >    < from: Xray Pelvis AP only (07.17.22 @ 10:00) >      IMPRESSION:    No acute osseous abnormality.    --- End of Report ---    < end of copied text >

## 2022-07-17 NOTE — H&P ADULT - ASSESSMENT
This is a 72 y/o M w/ PMHx of HTN, HLD and CVA x 15 years ago with residual Rt sided weakness presented to the ED s/p fall, +HT, -LOC, +AC (Coumadin).  This is a 70 y/o M w/ PMHx of HTN, HLD, Afib on coumadin and CVA x 15 years ago with residual Rt sided weakness presented to the ED s/p fall, +HT, -LOC, +AC (Coumadin).     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   - CT H + N negative for intracranial pathology, cervical spine fracture or subluxation  - CT Chest, Ab/Pelvis negative for  acute traumatic injury  - Neuro checks Qshift   - Repeat CT head if there is a change in neuro exam or change in mental status   - F/u Orthostatic vitals   - F/u B12, Folate   - PT evaluation   - UA negative   - Blood alcohol level leg  - Trops Negative   - Lactate 1.6     #Normocytic anemia   - Hb 12.3  - F/u Iron panel, B12, Folate     #?JOHN on CKD IIIa  - Cr 1.7 (Last admission Cr was also 1.7)  - Avoid nephrotoxic agents   - check ua / urine electrolytes + pr:cr  - trend bmp / scr  - nephro eval if no improvement     #Afib on Coumadin   - on Digoxin   - Monitor Daily INR for Coumadin dosing   - c/w Metoprolol 150mg qd    #HTN   - c/w Amlodipine   - On enalapril 20mg in the AM and 10mg in the PM, will hold for now given ?JOHN   - On Lasix 20mg QD, Hold due to JOHN     #HLD  - F/u Lipid panel   - c/w statin     #Chronic LE venous statis  - F/u wound care recs     #4 mm right lower lobe solid nodule - seen on CT   - F/u as outpatient  - Repeat CT in 12 months     #Misc  - DVT prophylaxis: Heparin sq  - GI prophylaxis: not indicated  - Diet: DASH   - Activity status: IAT   - Disposition: Admit to medicine         This is a 70 y/o M w/ PMHx of HTN, HLD, Afib on coumadin and CVA x 15 years ago with residual Rt sided weakness presented to the ED s/p fall, +HT, -LOC, +AC (Coumadin).     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   - CT H + N negative for intracranial pathology, cervical spine fracture or subluxation  - CT Chest, Ab/Pelvis negative for  acute traumatic injury  - Neuro checks Qshift   - Repeat CT head if there is a change in neuro exam or change in mental status   - F/u Orthostatic vitals   - F/u B12, Folate   - PT evaluation   - UA negative   - Blood alcohol level leg  - Trops Negative   - Lactate 1.6     #Normocytic anemia   - Hb 12.3  - F/u Iron panel, B12, Folate     #?JOHN on CKD IIIa  - Cr 1.7 (Last admission Cr was also 1.7)  - Avoid nephrotoxic agents   - check ua / urine electrolytes + pr:cr  - trend bmp / scr  - nephro eval if no improvement     #Afib on Coumadin   - on Digoxin   - Monitor Daily INR for Coumadin dosing   - c/w Metoprolol 150mg qd    #HTN   - c/w Amlodipine   - On enalapril 20mg in the AM and 10mg in the PM, will hold for now given ?JOHN   - On Lasix 20mg QD, Hold due to JOHN     #HLD  - F/u Lipid panel   - c/w statin     #Hx of Sciatica   - c/w gabapentin     #Chronic LE venous statis  - F/u wound care recs     #4 mm right lower lobe solid nodule - seen on CT   - F/u as outpatient  - Repeat CT in 12 months     #Misc  - DVT prophylaxis: Heparin sq  - GI prophylaxis: not indicated  - Diet: DASH   - Activity status: IAT   - Disposition: Admit to medicine         This is a 70 y/o M w/ PMHx of HTN, HLD, Afib on coumadin and CVA x 15 years ago with residual Rt sided weakness presented to the ED s/p fall, +HT, -LOC, +AC (Coumadin).     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   - CT H + N negative for intracranial pathology, cervical spine fracture or subluxation  - CT Chest, Ab/Pelvis negative for  acute traumatic injury  - Neuro checks Qshift   - Repeat CT head if there is a change in neuro exam or change in mental status   - F/u Orthostatic vitals   - F/u B12, Folate   - PT evaluation   - UA negative   - Blood alcohol level leg  - Trops Negative   - Lactate 1.6     #Normocytic anemia   - Hb 12.3  - F/u Iron panel, B12, Folate     #?JOHN on CKD IIIa  - Cr 1.7 (Last admission Cr was also 1.7)  - Avoid nephrotoxic agents   - check ua / urine electrolytes + pr:cr  - trend bmp / scr  - nephro eval if no improvement     #Afib on Coumadin   - on Digoxin   - Monitor Daily INR for Coumadin dosing   - c/w Metoprolol 150mg qd  - Holding dose of Coumadin today per Dr. Jimenes     #HTN   - c/w Amlodipine   - On enalapril 20mg in the AM and 10mg in the PM, will hold for now given ?JOHN   - On Lasix 20mg QD, Hold due to JOHN     #HLD  - F/u Lipid panel   - c/w statin     #Hx of Sciatica   - c/w gabapentin     #Chronic LE venous statis  - F/u wound care recs     #4 mm right lower lobe solid nodule - seen on CT   - F/u as outpatient  - Repeat CT in 12 months     #Misc  - DVT prophylaxis: Heparin sq  - GI prophylaxis: not indicated  - Diet: DASH   - Activity status: IAT   - Disposition: Admit to medicine

## 2022-07-17 NOTE — ED ADULT TRIAGE NOTE - CHIEF COMPLAINT QUOTE
BIBA form home after a trip and fall. Denies LOC, + head laceration, + AC use. Patient A&Ox4 in triage. History of stroke with residual R sided weakness

## 2022-07-17 NOTE — ED PROCEDURE NOTE - CPROC ED TOLERANCE1
H&P reviewed  After examining the patient I find no changes in the patients condition since the H&P had been written      Vitals:    10/21/19 0700   BP: (!) 122/82   Pulse: 102   Resp: (!) 26   Temp: 98 5 °F (36 9 °C)   SpO2: 100%
Patient tolerated procedure well.

## 2022-07-18 LAB
ALBUMIN SERPL ELPH-MCNC: 4.3 G/DL — SIGNIFICANT CHANGE UP (ref 3.5–5.2)
ALP SERPL-CCNC: 143 U/L — HIGH (ref 30–115)
ALT FLD-CCNC: 13 U/L — SIGNIFICANT CHANGE UP (ref 0–41)
ANION GAP SERPL CALC-SCNC: 14 MMOL/L — SIGNIFICANT CHANGE UP (ref 7–14)
AST SERPL-CCNC: 15 U/L — SIGNIFICANT CHANGE UP (ref 0–41)
BILIRUB SERPL-MCNC: 1.8 MG/DL — HIGH (ref 0.2–1.2)
BUN SERPL-MCNC: 20 MG/DL — SIGNIFICANT CHANGE UP (ref 10–20)
CALCIUM SERPL-MCNC: 9.2 MG/DL — SIGNIFICANT CHANGE UP (ref 8.5–10.1)
CHLORIDE SERPL-SCNC: 102 MMOL/L — SIGNIFICANT CHANGE UP (ref 98–110)
CHOLEST SERPL-MCNC: 131 MG/DL — SIGNIFICANT CHANGE UP
CO2 SERPL-SCNC: 23 MMOL/L — SIGNIFICANT CHANGE UP (ref 17–32)
CREAT SERPL-MCNC: 1.4 MG/DL — SIGNIFICANT CHANGE UP (ref 0.7–1.5)
EGFR: 54 ML/MIN/1.73M2 — LOW
GLUCOSE SERPL-MCNC: 109 MG/DL — HIGH (ref 70–99)
HCT VFR BLD CALC: 40.3 % — LOW (ref 42–52)
HDLC SERPL-MCNC: 36 MG/DL — LOW
HGB BLD-MCNC: 13 G/DL — LOW (ref 14–18)
INR BLD: 1.82 RATIO — HIGH (ref 0.65–1.3)
LIPID PNL WITH DIRECT LDL SERPL: 74 MG/DL — SIGNIFICANT CHANGE UP
MAGNESIUM SERPL-MCNC: 2.1 MG/DL — SIGNIFICANT CHANGE UP (ref 1.8–2.4)
MCHC RBC-ENTMCNC: 27.5 PG — SIGNIFICANT CHANGE UP (ref 27–31)
MCHC RBC-ENTMCNC: 32.3 G/DL — SIGNIFICANT CHANGE UP (ref 32–37)
MCV RBC AUTO: 85.2 FL — SIGNIFICANT CHANGE UP (ref 80–94)
NON HDL CHOLESTEROL: 95 MG/DL — SIGNIFICANT CHANGE UP
NRBC # BLD: 0 /100 WBCS — SIGNIFICANT CHANGE UP (ref 0–0)
PLATELET # BLD AUTO: 182 K/UL — SIGNIFICANT CHANGE UP (ref 130–400)
POTASSIUM SERPL-MCNC: 3.9 MMOL/L — SIGNIFICANT CHANGE UP (ref 3.5–5)
POTASSIUM SERPL-SCNC: 3.9 MMOL/L — SIGNIFICANT CHANGE UP (ref 3.5–5)
PROT SERPL-MCNC: 7.1 G/DL — SIGNIFICANT CHANGE UP (ref 6–8)
PROTHROM AB SERPL-ACNC: 20.8 SEC — HIGH (ref 9.95–12.87)
RBC # BLD: 4.73 M/UL — SIGNIFICANT CHANGE UP (ref 4.7–6.1)
RBC # FLD: 15.1 % — HIGH (ref 11.5–14.5)
SODIUM SERPL-SCNC: 139 MMOL/L — SIGNIFICANT CHANGE UP (ref 135–146)
TRIGL SERPL-MCNC: 101 MG/DL — SIGNIFICANT CHANGE UP
WBC # BLD: 8.01 K/UL — SIGNIFICANT CHANGE UP (ref 4.8–10.8)
WBC # FLD AUTO: 8.01 K/UL — SIGNIFICANT CHANGE UP (ref 4.8–10.8)

## 2022-07-18 RX ADMIN — Medication 150 MILLIGRAM(S): at 05:03

## 2022-07-18 RX ADMIN — GABAPENTIN 200 MILLIGRAM(S): 400 CAPSULE ORAL at 11:40

## 2022-07-18 RX ADMIN — AMLODIPINE BESYLATE 5 MILLIGRAM(S): 2.5 TABLET ORAL at 05:04

## 2022-07-18 RX ADMIN — Medication 125 MICROGRAM(S): at 05:05

## 2022-07-18 RX ADMIN — ATORVASTATIN CALCIUM 20 MILLIGRAM(S): 80 TABLET, FILM COATED ORAL at 21:07

## 2022-07-18 RX ADMIN — HEPARIN SODIUM 5000 UNIT(S): 5000 INJECTION INTRAVENOUS; SUBCUTANEOUS at 05:05

## 2022-07-18 RX ADMIN — HEPARIN SODIUM 5000 UNIT(S): 5000 INJECTION INTRAVENOUS; SUBCUTANEOUS at 17:05

## 2022-07-18 NOTE — DISCHARGE NOTE PROVIDER - CARE PROVIDER_API CALL
Carrie Jimenes  [address] [address]  Phone: [phone]  Fax: [phone]  Established Patient  Follow Up Time: 1 week

## 2022-07-18 NOTE — DISCHARGE NOTE PROVIDER - NSDCMRMEDTOKEN_GEN_ALL_CORE_FT
acetaminophen 325 mg oral tablet: 2 tab(s) orally every 6 hours, As needed, Temp greater or equal to 38C (100.4F), Mild Pain (1 - 3)  amLODIPine 5 mg oral tablet: 1 tab(s) orally once a day  atorvastatin 20 mg oral tablet: 1 tab(s) orally once a day (at bedtime)  digoxin 125 mcg (0.125 mg) oral tablet: 1 tab(s) orally once a day  enalapril 10 mg oral tablet: 2 tab(s) orally QAM, 1 tab orally QHS  furosemide 20 mg oral tablet: 1 tab(s) orally once a day  gabapentin 100 mg oral capsule: 2 cap(s) orally once a day (at bedtime)  metoprolol succinate 50 mg oral tablet, extended release: 3 tab(s) orally once a day  warfarin 2.5 mg oral tablet: 1 tab(s) orally once a day (at bedtime)

## 2022-07-18 NOTE — DISCHARGE NOTE PROVIDER - HOSPITAL COURSE
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   - CT H + N negative for intracranial pathology, cervical spine fracture or subluxation  - CT Chest, Ab/Pelvis negative for  acute traumatic injury  - Neuro checks Qshift   - Repeat CT head if there is a change in neuro exam or change in mental status   - Orthostatic vitals -ve   - B12, Folate WNL  - PT evaluation follow recommendations  - UA negative   - Blood alcohol level neg  - Trops Negative   - Lactate 1.6 downtrending     #Normocytic anemia   - Hb 12.3  - F/u Iron panel, B12, Folate     #?JOHN on CKD IIIa  - Cr 1.7 (Last admission Cr was also 1.7)  - Avoid nephrotoxic agents   - check ua / urine electrolytes + pr:cr  - trend bmp / scr  - nephro eval if no improvement     #Afib on Coumadin   - on Digoxin   - Monitor Daily INR for Coumadin dosing   - c/w Metoprolol 150mg qd  - Holding dose of Coumadin today per Dr. Jimenes     #HTN   - c/w Amlodipine   - On enalapril 20mg in the AM and 10mg in the PM, will hold for now given ?JOHN   - On Lasix 20mg QD, Hold due to JOHN     #HLD  - F/u Lipid panel   - c/w statin     #Hx of Sciatica   - c/w gabapentin     #Chronic LE venous statis  - F/u wound care recs     #4 mm right lower lobe solid nodule - seen on CT   - F/u as outpatient  - Repeat CT in 12 months     #Misc  - DVT prophylaxis: Heparin sq  - GI prophylaxis: not indicated  - Diet: DASH   - Activity status: IAT   - Disposition: Admit to medicine This is a 70 y/o M w/ PMHx of HTN, HLD, Afib on coumadin and CVA x 15 years ago with residual Rt sided weakness presented to the ED s/p fall, +HT, -LOC, +AC (Coumadin).     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   - CT H + N negative for intracranial pathology, cervical spine fracture or subluxation  - CT Chest, Ab/Pelvis negative for  acute traumatic injury  - Neuro checks Qshift   - Repeat CT head if there is a change in neuro exam or change in mental status   - Orthostatic vitals -ve   - B12, Folate WNL  - PT evaluation follow recommendations  - UA negative   - Blood alcohol level neg  - Trops Negative   - Lactate 1.6 downtrending     #Normocytic anemia   - Hb 12.3  - F/u Iron panel, B12, Folate     #?JOHN on CKD IIIa  - Cr 1.7 (Last admission Cr was also 1.7)  - Avoid nephrotoxic agents   - check ua / urine electrolytes + pr:cr  - trend bmp / scr  - nephro eval if no improvement     #Afib on Coumadin   - on Digoxin   - Monitor Daily INR for Coumadin dosing   - c/w Metoprolol 150mg qd  - Holding dose of Coumadin today per Dr. Jimenes     #HTN   - c/w Amlodipine   - On enalapril 20mg in the AM and 10mg in the PM, will hold for now given ?JOHN   - On Lasix 20mg QD, Hold due to JOHN     #HLD  - F/u Lipid panel   - c/w statin     #Hx of Sciatica   - c/w gabapentin     #Chronic LE venous statis  - F/u wound care recs     #4 mm right lower lobe solid nodule - seen on CT   - F/u as outpatient  - Repeat CT in 12 months     Patient is medically stabilized and clear for discharge This is a 70 y/o M w/ PMHx of HTN, HLD, Afib on coumadin and CVA x 15 years ago with residual Rt sided weakness presented to the ED s/p fall, +HT, -LOC, +AC (Coumadin).     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   - CT H + N negative for intracranial pathology, cervical spine fracture or subluxation  - CT Chest, Ab/Pelvis negative for  acute traumatic injury  - Neuro checks Qshift   - Repeat CT head if there is a change in neuro exam or change in mental status   - Orthostatic vitals -ve   - B12, Folate WNL  - PT evaluation follow recommendations  - UA negative   - Blood alcohol level neg  - Trops Negative   - Lactate 1.6 downtrending       #?JOHN on CKD IIIa  - Cr 1.7 (Last admission Cr was also 1.7) improving 1.4  - Avoid nephrotoxic agents       #Afib on Coumadin   - on Digoxin   - Monitor Daily INR for Coumadin dosing   - c/w Metoprolol 150mg qd  - Holding dose of Coumadin today per Dr. Jimenes     #4 mm right lower lobe solid nodule - seen on CT   - F/u as outpatient  - Repeat CT in 12 months         Patient is medically stabilized and clear for discharge This is a 70 y/o M w/ PMHx of HTN, HLD, Afib on coumadin and CVA x 15 years ago with residual Rt sided weakness presented to the ED s/p fall, +HT, -LOC, +AC (Coumadin).     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   - CT H + N negative for intracranial pathology, cervical spine fracture or subluxation  - CT Chest, Ab/Pelvis negative for  acute traumatic injury  - Neuro checks Qshift   - Repeat CT head if there is a change in neuro exam or change in mental status   - Orthostatic vitals -ve   - B12, Folate WNL  - PT evaluation follow recommendations  - UA negative   - Blood alcohol level neg  - Trops Negative   - Lactate 1.6 downtrending       #?JOHN on CKD IIIa  - Cr 1.7 (Last admission Cr was also 1.7) improving 1.4  - Avoid nephrotoxic agents       #Afib on Coumadin   - on Digoxin   - Monitor Daily INR for Coumadin dosing   - c/w Metoprolol 150mg qd  - restarted coumadin please get weekly INR    #4 mm right lower lobe solid nodule - seen on CT   - F/u as outpatient  - Repeat CT in 12 months         Patient is medically stabilized and clear for discharge This is a 72 y/o M w/ PMHx of HTN, HLD, Afib on coumadin and CVA x 15 years ago with residual Rt sided weakness presented to the ED s/p fall, +HT, -LOC, +AC (Coumadin).     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   - CT H + N negative for intracranial pathology, cervical spine fracture or subluxation  - CT Chest, Ab/Pelvis negative for  acute traumatic injury  - Neuro checks Qshift   - Repeat CT head if there is a change in neuro exam or change in mental status   - Orthostatic vitals -ve   - B12, Folate WNL  - PT evaluation follow recommendations  - UA negative   - Blood alcohol level neg  - Trops Negative   - Lactate 1.6 downtrending       #?JOHN on CKD IIIa  - Cr 1.7 (Last admission Cr was also 1.7) improving 1.4  - Avoid nephrotoxic agents       #Afib on Coumadin   - on Digoxin   - Monitor Daily INR for Coumadin dosing   - c/w Metoprolol 150mg qd  - restarted coumadin please get daily INR    #4 mm right lower lobe solid nodule - seen on CT   - F/u as outpatient  - Repeat CT in 12 months         Patient is medically stabilized and clear for discharge This is a 70 y/o M w/ PMHx of HTN, HLD, Afib on coumadin and CVA x 15 years ago with residual Rt sided weakness presented to the ED s/p fall, +HT, -LOC, +AC (Coumadin).     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   - CT H + N negative for intracranial pathology, cervical spine fracture or subluxation  - CT Chest, Ab/Pelvis negative for  acute traumatic injury  - Orthostatic vitals -ve   - B12, Folate WNL  - UA negative   - Blood alcohol level neg  - Trops Negative   - Lactate 1.6 downtrending       #?JOHN on CKD IIIa  - Cr 1.7 (Last admission Cr was also 1.7) improving 1.4  - Avoid nephrotoxic agents       #Afib on Coumadin   - on Digoxin   - Monitor Daily INR for Coumadin dosing   - c/w Metoprolol 150mg qd  - restarted coumadin please get daily INR    #4 mm right lower lobe solid nodule - seen on CT   - F/u as outpatient  - Repeat CT in 12 months         Patient is medically stabilized and clear for discharge

## 2022-07-18 NOTE — PROGRESS NOTE ADULT - SUBJECTIVE AND OBJECTIVE BOX
Tertiary Trauma Survey (TTS)    Date of TTS: 22 @ 11:17   Admit Date: 22  Trauma Activation: ALERT  Admit GCS: 15          HPI:  This is a 70 y/o M w/ PMHx of HTN, HLD, Afib on coumadin and CVA x 15 years ago with residual Rt sided weakness presented to the ED s/p fall, +HT, -LOC, +AC (Coumadin). History was obtained from patient and his sister. Patient was walking and tripped over his feet causing him to fall down and hit his head on the ground. Patient's sister states there was blood coming from his head which prompted her to call 911. Patient was unable to get up on his own and required help to stand back up. Last dose of Coumadin taken last night. In the ED, patient HD stable, ABCs intact, GCS 15, A&Ox2 (at baseline). Pt complains of head pain. Denies SOB, CP, N/V. External signs of trauma include a ~3.5cm left frontal laceration, repaired in the ED.     ED Vitals T(F): 97.7, HR: 70, BP: 178/89, RR: 18, SpO2: 99%  Labs significant for Hb 12.3, HCT 38.6, WBC 6.40, . Trops negx1, UA neg, lactate 1.6, Cr 1.7   140  |  105  |  27<H>  ----------------------------<  104<H>  4.1   |  21  |  1.7<H>       (2022 13:43)    Patient seen and examined.     PHYSICAL EXAM:  General: NAD  HEENT: Normocephalic, 4 staples to parietal region of scalp, EOMI, PEERLA.   Neck: Soft, midline trachea. no c-spine tenderness  Chest: No chest wall tenderness, no subcutaneous emphysema   Cardiac: S1, S2, RRR  Respiratory: Bilateral breath sounds, clear and equal bilaterally  Abdomen: Soft, non-distended, non-tender, no rebound, no guarding.  Groin: Normal appearing, pelvis stable   Ext:  Moving b/l upper and lower extremities, known right upper extremity weakness s/p CVA. Palpable Radial b/l UE, b/l DP palpable in LE.   Back: No T/L/S spine tenderness, No palpable runoff/stepoff/deformity    Vital Signs Last 24 Hrs  T(C): 35.7 (2022 08:18), Max: 36.9 (2022 16:59)  T(F): 96.2 (2022 08:18), Max: 98.5 (2022 16:59)  HR: 70 (2022 08:18) (62 - 114)  BP: 169/92 (2022 08:18) (168/88 - 182/85)  BP(mean): --  RR: 18 (2022 08:18) (18 - 18)  SpO2: 98% (2022 05:15) (98% - 98%)    Parameters below as of 2022 05:15  Patient On (Oxygen Delivery Method): room air        Labs:  CAPILLARY BLOOD GLUCOSE                              13.0   8.01  )-----------( 182      ( 2022 09:10 )             40.3         07-18    139  |  102  |  20  ----------------------------<  109<H>  3.9   |  23  |  1.4      Calcium, Total Serum: 9.2 mg/dL (22 @ 09:10)      LFTs:             7.1  | 1.8  | 15       ------------------[143     ( 2022 09:10 )  4.3  | x    | 13          Lipase:x      Amylase:x         Lactate, Blood: 1.6 mmol/L (22 @ 07:20)      Coags:     20.80  ----< 1.82    ( 2022 09:10 )     x           CARDIAC MARKERS ( 2022 07:20 )  x     / <0.01 ng/mL / x     / x     / x          Urinalysis Basic - ( 2022 07:25 )    Color: Light Yellow / Appearance: Clear / S.012 / pH: x  Gluc: x / Ketone: Negative  / Bili: Negative / Urobili: <2 mg/dL   Blood: x / Protein: 30 mg/dL / Nitrite: Negative   Leuk Esterase: Negative / RBC: 3 /HPF / WBC 1 /HPF   Sq Epi: x / Non Sq Epi: 1 /HPF / Bacteria: Negative      RADIOLOGICAL FINDINGS REVIEW:  < from: CT Head No Cont (22 @ 07:50) >    No acute intracranialpathology.    Stable moderate chronic microvascular ischemic changes.    Small left frontal scalp swelling.    < end of copied text >  < from: CT Cervical Spine No Cont (22 @ 07:53) >  IMPRESSION:    No acute cervical spine fracture or subluxation.      < end of copied text >  < from: CT Chest w/ IV Cont (22 @ 08:20) >  IMPRESSION:    1.  No definite evidence of acute traumatic injury within the chest,   abdomen, or pelvis.    2.  Large right inguinal scrotal hernia containing fat and multiple   partially imaged segments of nonobstructed bowel.    3.  Large left hydrocele.    4.  Dilated ascending thoracic aorta, measuring 4.5 cm the level of the   right main pulmonary artery.    5.  4 mm right lower lobe solid nodule. In a high-risk patient, consider   12 months CT chest follow-up.    < end of copied text >  < from: Xray Chest 1 View AP/PA (22 @ 10:00) >  Impression:    No radiographic evidence of acute cardiopulmonary disease.      < end of copied text >  < from: Xray Pelvis AP only (22 @ 10:00) >  No acute osseous abnormality.    < end of copied text >      ASSESSMENT/ PLAN:   71yM w/ PMHx of HTN, HLD and CVA x 15 years ago with residual Rt sided weakness seen as Trauma Alert s/p fall, +HT, -LOC, +AC (Coumadin). Trauma assessment in ED: ABCs intact , GCS 15 , AAOx3 , with physical exam findings, imaging, and labs as documented above, injuries are identified: no acute traumatic injuries      - All images/reports reviewed. No further traumatic work-up warranted. Further management per medical team. Recall trauma PRN.

## 2022-07-18 NOTE — DISCHARGE NOTE PROVIDER - NSFOLLOWUPCLINICS_GEN_ALL_ED_FT
[facility]  Coumadin  [address] [place] [address]  Phone: [phone]  Fax:   Follow Up Time: 1-3 days

## 2022-07-18 NOTE — PROGRESS NOTE ADULT - ASSESSMENT
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 - stapled  - CT H + N negative for intracranial pathology, cervical spine fracture or subluxation  - CT Chest, Ab/Pelvis negative for  acute traumatic injury  - Neuro checks Qshift - no changes noted over 24hrs  - Repeat CT head if there is a change in neuro exam or change in mental status   - F/u Orthostatic vitals   - F/u B12, Folate   - PT evaluation   - UA negative   - Blood alcohol level NEG  - Trops Negative   - Lactate 1.6     #Normocytic anemia   - Hb 12.3  - F/u Iron panel, B12, Folate     #?JOHN on CKD IIIa  - Cr 1.7 (Last admission Cr was also 1.7)  - Avoid nephrotoxic agents   - check ua / urine electrolytes + pr:cr  - trend bmp / scr  - nephro eval if no improvement     #Afib on Coumadin   - on Digoxin   - Monitor Daily INR for Coumadin dosing   - c/w Metoprolol 150mg qd  - Holding dose of Coumadin today per Dr. Jimenes     #HTN   - c/w Amlodipine   - On enalapril 20mg in the AM and 10mg in the PM, will hold for now given ?JOHN   - On Lasix 20mg QD, Hold due to JOHN     #HLD  - F/u Lipid panel   - c/w statin     #Hx of Sciatica   - c/w gabapentin     #Chronic LE venous statis  - F/u wound care recs;  - get venous duplex;  - Dr Jimenes reports trhat LE findings are chronic     #4 mm right lower lobe solid nodule - seen on CT   - F/u as outpatient  - Repeat CT in 12 months     #Misc  - DVT prophylaxis: Heparin sq  - GI prophylaxis: not indicated  - Diet: DASH   - Activity status: IAT   - Disposition: Admit to medicine

## 2022-07-18 NOTE — PROGRESS NOTE ADULT - SUBJECTIVE AND OBJECTIVE BOX
Chart reviewed, patient examined. Pertinent results reviewed.  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 History was obtained from patient and his sister. Patient was walking and tripped over his feet causing him to fall down and hit his head on the ground. Patient's sister states there was blood coming from his head which prompted her to call 911. Patient was unable to get up on his own and required help to stand back up. Last dose of Coumadin taken last night. In the ED, patient HD stable, ABCs intact, GCS 15, A&Ox2 (at baseline). Pt complains of head pain. Denies SOB, CP, N/V. External signs of trauma include a ~3.5cm left frontal laceration, repaired in the ED.     Overnight events     Pt had bp of 182/85. was asymptomatic did not take bp meds today. He received his amlodipine and was rechecked     MEDICATIONS  (STANDING):  amLODIPine   Tablet 5 milliGRAM(s) Oral daily  atorvastatin 20 milliGRAM(s) Oral at bedtime  digoxin     Tablet 125 MICROGram(s) Oral daily  gabapentin 200 milliGRAM(s) Oral daily  heparin   Injectable 5000 Unit(s) SubCutaneous every 12 hours  metoprolol succinate  milliGRAM(s) Oral daily    MEDICATIONS  (PRN):    Vital Signs Last 24 Hrs  T(C): 35.7 (18 Jul 2022 08:18), Max: 36.4 (17 Jul 2022 23:56)  T(F): 96.2 (18 Jul 2022 08:18), Max: 97.6 (17 Jul 2022 23:56)  HR: 70 (18 Jul 2022 08:18) (63 - 114)  BP: 169/92 (18 Jul 2022 08:18) (168/88 - 169/92)  BP(mean): --  RR: 18 (18 Jul 2022 08:18) (18 - 18)  SpO2: 98% (18 Jul 2022 08:30) (98% - 98%)    Parameters below as of 18 Jul 2022 08:30  Patient On (Oxygen Delivery Method): room air              PHYSICAL EXAM:  GENERAL: NAD, lying in bed comfortably; R Hemiparesis                          HEAD:  Atraumatic, Normocephalic  EYES: EOMI, PERRLA, conjunctiva and sclera clear  ENT: Moist mucous membranes                                         ASSESSMENT & PLAN    This is a 70 y/o M w/ PMHx of HTN, HLD, Afib on coumadin and CVA x 15 years ago with residual Rt sided weakness presented to the ED s/p fall, +HT, -LOC, +AC (Coumadin).     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   - CT H + N negative for intracranial pathology, cervical spine fracture or subluxation  - CT Chest, Ab/Pelvis negative for  acute traumatic injury  - F/u Orthostatic vitals   - F/u B12, Folate   - PT evaluation - pending- home or Short Term rehab(SNF)  - UA negative   - Trops Negative   - Lactate 1.6   - order EKG    #Normocytic anemia   - Hb 12.3  - F/u Iron panel, B12, Folate     #?JOHN on CKD IIIa  - Cr 1.7 (Last admission Cr was also 1.7)  - Avoid nephrotoxic agents   - check ua / urine electrolytes + pr:cr  - trend bmp / scr  - nephro eval if no improvement     #Afib on Coumadin   - on Digoxin   \- check VR    - Monitor Daily INR for Coumadin dosing   - c/w Metoprolol 150mg qd  - Holding dose of Coumadin today per Dr. Jimenes     #HTN   - c/w Amlodipine   - On enalapril 20mg in the AM and 10mg in the PM, will hold for now given ?JOHN   - On Lasix 20mg QD, Hold due to JOHN     #HLD  - F/u Lipid panel   - c/w statin     #Hx of Sciatica   - c/w gabapentin     #Chronic LE venous statis  - F/u wound care recs   - check LE venous duplex    #4 mm right lower lobe solid nodule - seen on CT   - F/u as outpatient  - Repeat CT in 12 months     #Misc  - DVT prophylaxis: Heparin sq- until AC restarted  - GI prophylaxis: not indicated  - Diet: DASH   - Activity status: IAT   - Disposition: Admitted to medicine         Electronic Signatures:  William Villagran)  (Signed 18-Jul-2022 17:46)  	Authored: Progress Note, Reason for Admission, Subjective and Objective

## 2022-07-18 NOTE — PROGRESS NOTE ADULT - SUBJECTIVE AND OBJECTIVE BOX
MARCELO BASSETT 71y Male  MRN#: [id number]   CODE STATUS:_full    Hospital Day: 1d    Pt is currently admitted with the primary diagnosis of Mechanical fall    SUBJECTIVE  Hospital Course  This is a 70 y/o M w/ PMHx of HTN, HLD, Afib on coumadin and CVA x 15 years ago with residual Rt sided weakness presented to the ED s/p fall, +HT, -LOC, +AC (Coumadin). History was obtained from patient and his sister. Patient was walking and tripped over his feet causing him to fall down and hit his head on the ground. Patient's sister states there was blood coming from his head which prompted her to call 911. Patient was unable to get up on his own and required help to stand back up. Last dose of Coumadin taken last night. In the ED, patient HD stable, ABCs intact, GCS 15, A&Ox2 (at baseline). Pt complains of head pain. Denies SOB, CP, N/V. External signs of trauma include a ~3.5cm left frontal laceration, repaired in the ED.     Overnight events     Pt had bp of 182/85. was asymptommatic. did not take bp meds today. asked rn to give him his amlodipine and recheck in an hour.    Subjective complaints     Patient seen and examined at bed side. Patient reports to be doing well no complaints.    Present Today:   - [place]:  No [x ], Yes [   ] : Indication:                                              OBJECTIVE  PAST MEDICAL & SURGICAL HISTORY  Sciatica    HTN (hypertension)    Afib    Right sided weakness    CVA (cerebrovascular accident)  CVA 12 years ago with right sided weakness, uses four prong cane    No significant past surgical history                                                ALLERGIES:  No Known Allergies                           HOME MEDICATIONS  Home Medications:  acetaminophen 325 mg oral tablet: 2 tab(s) orally every 6 hours, As needed, Temp greater or equal to 38C (100.4F), Mild Pain (1 - 3) (2022 13:51)  amLODIPine 5 mg oral tablet: 1 tab(s) orally once a day (2022 13:51)  atorvastatin 20 mg oral tablet: 1 tab(s) orally once a day (at bedtime) (2022 13:51)  digoxin 125 mcg (0.125 mg) oral tablet: 1 tab(s) orally once a day (2022 13:51)  enalapril 10 mg oral tablet: 2 tab(s) orally QAM, 1 tab orally QHS (2022 13:51)  furosemide 20 mg oral tablet: 1 tab(s) orally once a day (2022 13:51)  gabapentin 100 mg oral capsule: 2 cap(s) orally once a day (at bedtime) (2022 13:51)  metoprolol succinate 50 mg oral tablet, extended release: 3 tab(s) orally once a day (2022 13:51)  warfarin 2.5 mg oral tablet: 1 tab(s) orally once a day (at bedtime) (2022 13:51)                           MEDICATIONS:  STANDING MEDICATIONS  amLODIPine   Tablet 5 milliGRAM(s) Oral daily  atorvastatin 20 milliGRAM(s) Oral at bedtime  digoxin     Tablet 125 MICROGram(s) Oral daily  gabapentin 200 milliGRAM(s) Oral daily  heparin   Injectable 5000 Unit(s) SubCutaneous every 12 hours  metoprolol succinate  milliGRAM(s) Oral daily    PRN MEDICATIONS                                            ------------------------------------------------------------  VITAL SIGNS: Last 24 Hours  T(C): 36.3 (2022 05:15), Max: 36.9 (2022 16:59)  T(F): 97.4 (2022 05:15), Max: 98.5 (2022 16:59)  HR: 114 (2022 05:15) (62 - 114)  BP: 168/88 (2022 05:15) (168/88 - 182/85)  BP(mean): --  RR: 18 (2022 05:15) (18 - 18)  SpO2: 98% (2022 05:15) (98% - 99%)                                               LABS:                        12.3   6.40  )-----------( 176      ( 2022 07:20 )             38.6     07-17    140  |  105  |  27<H>  ----------------------------<  104<H>  4.1   |  21  |  1.7<H>    Ca    8.8      2022 07:20    TPro  6.9  /  Alb  3.8  /  TBili  0.8  /  DBili  x   /  AST  15  /  ALT  12  /  AlkPhos  125<H>  -    PT/INR - ( 2022 07:20 )   PT: 21.10 sec;   INR: 1.85 ratio         PTT - ( 2022 07:20 )  PTT:34.0 sec  Urinalysis Basic - ( 2022 07:25 )    Color: Light Yellow / Appearance: Clear / S.012 / pH: x  Gluc: x / Ketone: Negative  / Bili: Negative / Urobili: <2 mg/dL   Blood: x / Protein: 30 mg/dL / Nitrite: Negative   Leuk Esterase: Negative / RBC: 3 /HPF / WBC 1 /HPF   Sq Epi: x / Non Sq Epi: 1 /HPF / Bacteria: Negative        Lactate, Blood: 1.6 mmol/L (22 @ 07:20)  Troponin T, Serum: <0.01 ng/mL (22 @ 07:20)          CARDIAC MARKERS ( 2022 07:20 )  x     / <0.01 ng/mL / x     / x     / x                                                  RADIOLOGY:  XR PELVIS AP ONLY 1-2 VIEWS                          PROCEDURE DATE:  2022    No acute fracture or dislocation. Degenerative change of the lower lumbar   spine and bilateral hip joints.    IMPRESSION:    No acute osseous abnormality.    CT CHEST IC                          PROCEDURE DATE:  2022      IMPRESSION:    1.  No definite evidence of acute traumatic injury within the chest,   abdomen, or pelvis.    2.  Large right inguinal scrotal hernia containing fat and multiple   partially imaged segments of nonobstructed bowel.    3.  Large left hydrocele.    4.  Dilated ascending thoracic aorta, measuring 4.5 cm the level of the   right main pulmonary artery.    5.  4 mm right lower lobe solid nodule. In a high-risk patient, consider   12 months CT chest follow-up.    CT BRAIN                          PROCEDURE DATE:  2022      IMPRESSION:    No acute intracranialpathology.    Stable moderate chronic microvascular ischemic changes.    Small left frontal scalp swelling.      PHYSICAL EXAM:  GENERAL: NAD, lying in bed comfortably  HEAD:  Atraumatic, Normocephalic  EYES: EOMI, PERRLA, conjunctiva and sclera clear  ENT: Moist mucous membranes  NECK: Supple, No JVD  CHEST/LUNG: Clear to auscultation bilaterally; No rales, rhonchi, wheezing, or rubs. Unlabored respirations  HEART: Regular rate and rhythm; No murmurs, rubs, or gallops  ABDOMEN: BSx4; Soft, nontender, nondistended  EXTREMITIES:  2+ Peripheral Pulses, brisk capillary refill. No clubbing, cyanosis, or edema  NERVOUS SYSTEM:  A&Ox3, no focal deficits   SKIN: No rashes or lesions                                         ASSESSMENT & PLAN    This is a 70 y/o M w/ PMHx of HTN, HLD, Afib on coumadin and CVA x 15 years ago with residual Rt sided weakness presented to the ED s/p fall, +HT, -LOC, +AC (Coumadin).     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   - CT H + N negative for intracranial pathology, cervical spine fracture or subluxation  - CT Chest, Ab/Pelvis negative for  acute traumatic injury  - F/u Orthostatic vitals   - F/u B12, Folate   - PT evaluation   - UA negative   - Trops Negative   - Lactate 1.6   - order EKG    #Normocytic anemia   - Hb 12.3  - F/u Iron panel, B12, Folate     #?JOHN on CKD IIIa  - Cr 1.7 (Last admission Cr was also 1.7)  - Avoid nephrotoxic agents   - check ua / urine electrolytes + pr:cr  - trend bmp / scr  - nephro eval if no improvement     #Afib on Coumadin   - on Digoxin   - Monitor Daily INR for Coumadin dosing   - c/w Metoprolol 150mg qd  - Holding dose of Coumadin today per Dr. Jimenes     #HTN   - c/w Amlodipine   - On enalapril 20mg in the AM and 10mg in the PM, will hold for now given ?JOHN   - On Lasix 20mg QD, Hold due to JOHN     #HLD  - F/u Lipid panel   - c/w statin     #Hx of Sciatica   - c/w gabapentin     #Chronic LE venous statis  - F/u wound care recs     #4 mm right lower lobe solid nodule - seen on CT   - F/u as outpatient  - Repeat CT in 12 months     #Misc  - DVT prophylaxis: Heparin sq  - GI prophylaxis: not indicated  - Diet: DASH   - Activity status: IAT   - Disposition: Admit to medicine

## 2022-07-18 NOTE — DISCHARGE NOTE PROVIDER - NSDCCPCAREPLAN_GEN_ALL_CORE_FT
PRINCIPAL DISCHARGE DIAGNOSIS  Diagnosis: Fall  Assessment and Plan of Treatment: You were diangosed with a mechanical fall secondary to tripping. You were seen and examined by trauma surgery. Trauma workup was negative for any acute pathology including fractures or bleeding in the brain. You were seen by PT and will need to follow their recommendations. Please continue to take your home medications. Dial 911 or present to nearest ED if symptoms recurr.      SECONDARY DISCHARGE DIAGNOSES  Diagnosis: Unable to ambulate  Assessment and Plan of Treatment:

## 2022-07-19 LAB
FERRITIN SERPL-MCNC: 174 NG/ML — SIGNIFICANT CHANGE UP (ref 30–400)
FOLATE SERPL-MCNC: 9.9 NG/ML — SIGNIFICANT CHANGE UP
VIT B12 SERPL-MCNC: 244 PG/ML — SIGNIFICANT CHANGE UP (ref 232–1245)

## 2022-07-19 PROCEDURE — 93970 EXTREMITY STUDY: CPT | Mod: 26

## 2022-07-19 PROCEDURE — 93010 ELECTROCARDIOGRAM REPORT: CPT

## 2022-07-19 RX ADMIN — AMLODIPINE BESYLATE 5 MILLIGRAM(S): 2.5 TABLET ORAL at 05:21

## 2022-07-19 RX ADMIN — ATORVASTATIN CALCIUM 20 MILLIGRAM(S): 80 TABLET, FILM COATED ORAL at 21:38

## 2022-07-19 RX ADMIN — Medication 150 MILLIGRAM(S): at 05:21

## 2022-07-19 RX ADMIN — Medication 125 MICROGRAM(S): at 05:21

## 2022-07-19 RX ADMIN — GABAPENTIN 200 MILLIGRAM(S): 400 CAPSULE ORAL at 11:01

## 2022-07-19 RX ADMIN — HEPARIN SODIUM 5000 UNIT(S): 5000 INJECTION INTRAVENOUS; SUBCUTANEOUS at 05:22

## 2022-07-19 RX ADMIN — HEPARIN SODIUM 5000 UNIT(S): 5000 INJECTION INTRAVENOUS; SUBCUTANEOUS at 17:24

## 2022-07-19 NOTE — PHYSICAL THERAPY INITIAL EVALUATION ADULT - ADDITIONAL COMMENTS
Patient lives with sister in house with 10 steps to enter. Was independent in ambulation using [facility] in home, short distances. Was assisted in ADL's

## 2022-07-19 NOTE — PROGRESS NOTE ADULT - SUBJECTIVE AND OBJECTIVE BOX
MARCELO BASSETT 71y Male  MRN#: [id number]   CODE STATUS:_full    Hospital Day: 2d    Pt is currently admitted with the primary diagnosis of Mechanical fall    SUBJECTIVE  Hospital Course  This is a 72 y/o M w/ PMHx of HTN, HLD, Afib on coumadin and CVA x 15 years ago with residual Rt sided weakness presented to the ED s/p fall, +HT, -LOC, +AC (Coumadin). History was obtained from patient and his sister. Patient was walking and tripped over his feet causing him to fall down and hit his head on the ground. Patient's sister states there was blood coming from his head which prompted her to call 911. Patient was unable to get up on his own and required help to stand back up. Last dose of Coumadin taken last night. In the ED, patient HD stable, ABCs intact, GCS 15, A&Ox2 (at baseline). Pt complains of head pain. Denies SOB, CP, N/V. External signs of trauma include a ~3.5cm left frontal laceration, repaired in the ED.     Overnight events     NAEO    Subjective complaints     Patient seen and examined at bed side. Patient reports to be doing well no complaints.                                             OBJECTIVE  PAST MEDICAL & SURGICAL HISTORY  Sciatica    HTN (hypertension)    Afib    Right sided weakness    CVA (cerebrovascular accident)  CVA 12 years ago with right sided weakness, uses four prong cane    No significant past surgical history                                                ALLERGIES:  No Known Allergies                           HOME MEDICATIONS  Home Medications:  acetaminophen 325 mg oral tablet: 2 tab(s) orally every 6 hours, As needed, Temp greater or equal to 38C (100.4F), Mild Pain (1 - 3) (17 Jul 2022 13:51)  amLODIPine 5 mg oral tablet: 1 tab(s) orally once a day (17 Jul 2022 13:51)  atorvastatin 20 mg oral tablet: 1 tab(s) orally once a day (at bedtime) (17 Jul 2022 13:51)  digoxin 125 mcg (0.125 mg) oral tablet: 1 tab(s) orally once a day (17 Jul 2022 13:51)  enalapril 10 mg oral tablet: 2 tab(s) orally QAM, 1 tab orally QHS (17 Jul 2022 13:51)  furosemide 20 mg oral tablet: 1 tab(s) orally once a day (17 Jul 2022 13:51)  gabapentin 100 mg oral capsule: 2 cap(s) orally once a day (at bedtime) (17 Jul 2022 13:51)  metoprolol succinate 50 mg oral tablet, extended release: 3 tab(s) orally once a day (17 Jul 2022 13:51)  warfarin 2.5 mg oral tablet: 1 tab(s) orally once a day (at bedtime) (17 Jul 2022 13:51)                           MEDICATIONS:  STANDING MEDICATIONS  amLODIPine   Tablet 5 milliGRAM(s) Oral daily  atorvastatin 20 milliGRAM(s) Oral at bedtime  digoxin     Tablet 125 MICROGram(s) Oral daily  gabapentin 200 milliGRAM(s) Oral daily  heparin   Injectable 5000 Unit(s) SubCutaneous every 12 hours  metoprolol succinate  milliGRAM(s) Oral daily    PRN MEDICATIONS                                            ------------------------------------------------------------  VITAL SIGNS: Last 24 Hours  T(C): 36.3 (19 Jul 2022 16:00), Max: 37.1 (19 Jul 2022 00:00)  T(F): 97.3 (19 Jul 2022 16:00), Max: 98.7 (19 Jul 2022 00:00)  HR: 68 (19 Jul 2022 16:00) (59 - 75)  BP: 149/72 (19 Jul 2022 16:00) (149/72 - 171/94)  BP(mean): --  RR: 18 (19 Jul 2022 16:00) (18 - 18)  SpO2: 98% (19 Jul 2022 08:19) (98% - 99%)                                               LABS:                        13.0   8.01  )-----------( 182      ( 18 Jul 2022 09:10 )             40.3     07-18    139  |  102  |  20  ----------------------------<  109<H>  3.9   |  23  |  1.4    Ca    9.2      18 Jul 2022 09:10  Mg     2.1     07-18    TPro  7.1  /  Alb  4.3  /  TBili  1.8<H>  /  DBili  x   /  AST  15  /  ALT  13  /  AlkPhos  143<H>  07-18    PT/INR - ( 18 Jul 2022 09:10 )   PT: 20.80 sec;   INR: 1.82 ratio                                                                   RADIOLOGY:    PHYSICAL EXAM:  GENERAL: NAD, lying in bed comfortably  HEAD:  Atraumatic, Normocephalic  EYES: EOMI, PERRLA, conjunctiva and sclera clear  ENT: Moist mucous membranes  NECK: Supple, No JVD  CHEST/LUNG: Clear to auscultation bilaterally; No rales, rhonchi, wheezing, or rubs. Unlabored respirations  HEART: Regular rate and rhythm; No murmurs, rubs, or gallops  ABDOMEN: BSx4; Soft, nontender, nondistended  EXTREMITIES:  2+ Peripheral Pulses, brisk capillary refill. No clubbing, cyanosis, or edema  NERVOUS SYSTEM:  A&Ox3, no focal deficits   SKIN: No rashes or lesions                                         ASSESSMENT & PLAN    This is a 72 y/o M w/ PMHx of HTN, HLD, Afib on coumadin and CVA x 15 years ago with residual Rt sided weakness presented to the ED s/p fall, +HT, -LOC, +AC (Coumadin).     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   - CT H + N negative for intracranial pathology, cervical spine fracture or subluxation  - CT Chest, Ab/Pelvis negative for  acute traumatic injury  - F/u Orthostatic vitals   - F/u B12, Folate   - PT evaluation   - UA negative   - Trops Negative   - Lactate 1.6   - needs PT    #Normocytic anemia   - Hb 12.3  - F/u Iron panel, B12, Folate     #?JOHN on CKD IIIa  - Cr 1.7 (Last admission Cr was also 1.7)  - Avoid nephrotoxic agents   - check ua / urine electrolytes + pr:cr  - trend bmp / scr  - nephro eval if no improvement     #Afib on Coumadin   - on Digoxin   - Monitor Daily INR for Coumadin dosing   - c/w Metoprolol 150mg qd  - Holding dose of Coumadin today per Dr. Jimenes     #HTN   - c/w Amlodipine   - On enalapril 20mg in the AM and 10mg in the PM, will hold for now given ?JOHN   - On Lasix 20mg QD, Hold due to JOHN     #HLD  - F/u Lipid panel   - c/w statin     #Hx of Sciatica   - c/w gabapentin     #Chronic LE venous statis  - F/u wound care recs     #4 mm right lower lobe solid nodule - seen on CT   - F/u as outpatient  - Repeat CT in 12 months     #Misc  - DVT prophylaxis: Heparin sq  - GI prophylaxis: not indicated  - Diet: DASH   - Activity status: IAT   - Disposition: Admit to medicine

## 2022-07-19 NOTE — PROGRESS NOTE ADULT - ASSESSMENT
ASSESSMENT & PLAN    This is a 70 y/o M w/ PMHx of HTN, HLD, Afib on coumadin and CVA x 15 years ago with residual Rt sided weakness presented to the ED s/p fall, +HT, -LOC, +AC (Coumadin).     Mechanical fall  Hx of CVA with residual Rt sided weakness and expressive aphasia  - +HT, -LOC, +AC (Coumadin)  - Negative trauma workup   - 3.5cm left frontal laceration, repaired in the ED.   - CT H + N negative for intracranial pathology, cervical spine fracture or subluxation  - CT Chest, Ab/Pelvis negative for  acute traumatic injury  - F/u Orthostatic vitals   - PT evaluation   - needs PT    Normocytic anemia   - Hb 12.3  - F/u Iron panel, B12, Folate     JOHN on CKD IIIa  - Cr 1.7   - Avoid nephrotoxic agents   - avoid dehydration    Afib on Coumadin   - on Digoxin   - Monitor Daily INR for Coumadin dosing   - continue Metoprolol 150mg qd  - resume Coumadin dose    HTN   - on Amlodipine   - Enalapril 20mg in the AM and 10mg in the PM, will hold for now given ?JOHN   - On Lasix 20mg QD, Hold due to JOHN     HLD  - continue statin     Hx of Chronic Back Pain /Sciatica   - stable on Gabapentin     Chronic LE venous statis  - local care  - resume diuretic    #4 mm right lower lobe solid nodule - seen on CT   - F/u as outpatient  - Repeat CT in 12 months       - DVT prophylaxis: Heparin sq  - GI prophylaxis: not indicated  - Diet: DASH   - Activity status: IAT   - Disposition: D/C planning home with services vs [facility] re[facility]. Patient agrees to [facility], has been there twice before

## 2022-07-19 NOTE — PHYSICAL THERAPY INITIAL EVALUATION ADULT - IMPAIRMENTS CONTRIBUTING TO GAIT DEVIATIONS, PT EVAL
impaired balance/decreased flexibility/impaired motor control/abnormal muscle tone/decreased strength

## 2022-07-19 NOTE — PHYSICAL THERAPY INITIAL EVALUATION ADULT - GAIT DEVIATIONS NOTED, PT EVAL
R ankle platarflexed/increased time in double stance/decreased step length/decreased stride length/increased stride width

## 2022-07-19 NOTE — PROGRESS NOTE ADULT - SUBJECTIVE AND OBJECTIVE BOX
MARCELO BASSETT  71y  Male  HPI:  This is a 72 y/o M w/ PMHx of HTN, HLD, Afib on coumadin and CVA x 15 years ago with residual Rt sided weakness presented to the ED s/p fall, +HT, -LOC, +AC (Coumadin). History was obtained from patient and his sister. Patient was walking and tripped over his feet causing him to fall down and hit his head on the ground. Patient's sister states there was blood coming from his head which prompted her to call 911. Patient was unable to get up on his own and required help to stand back up. Last dose of Coumadin taken last night. In the ED, patient HD stable, ABCs intact, GCS 15, A&Ox2 (at baseline). Pt complains of head pain. Denies SOB, CP, N/V. External signs of trauma include a ~3.5cm left frontal laceration, repaired in the ED.     ED Vitals T(F): 97.7, HR: 70, BP: 178/89, RR: 18, SpO2: 99%  Labs significant for Hb 12.3, HCT 38.6, WBC 6.40, . Trops negx1, UA neg, lactate 1.6, Cr 1.7   140  |  105  |  27<H>  ----------------------------<  104<H>  4.1   |  21  |  1.7<H>       (17 Jul 2022 13:43)    MEDICATIONS  (STANDING):  amLODIPine   Tablet 5 milliGRAM(s) Oral daily  atorvastatin 20 milliGRAM(s) Oral at bedtime  digoxin     Tablet 125 MICROGram(s) Oral daily  gabapentin 200 milliGRAM(s) Oral daily  heparin   Injectable 5000 Unit(s) SubCutaneous every 12 hours  metoprolol succinate  milliGRAM(s) Oral daily    MEDICATIONS  (PRN):    INTERVAL EVENTS: Patient seen today without distress, admits to multiple falls in the past 3 months, this time with concern for head injury, 911 called. Patient's truma f/u negative.  Coumadin has been on hold, need to resume. PT evaluation pending at time of mid day visit.    T(C): 36.3 (07-19-22 @ 16:00), Max: 37.1 (07-19-22 @ 00:00)  HR: 68 (07-19-22 @ 16:00) (59 - 75)  BP: 149/72 (07-19-22 @ 16:00) (149/72 - 171/94)  RR: 18 (07-19-22 @ 16:00) (18 - 18)  SpO2: 98% (07-19-22 @ 08:19) (98% - 99%)  Wt(kg): --Vital Signs Last 24 Hrs  T(C): 36.3 (19 Jul 2022 16:00), Max: 37.1 (19 Jul 2022 00:00)  T(F): 97.3 (19 Jul 2022 16:00), Max: 98.7 (19 Jul 2022 00:00)  HR: 68 (19 Jul 2022 16:00) (59 - 75)  BP: 149/72 (19 Jul 2022 16:00) (149/72 - 171/94)  BP(mean): --  RR: 18 (19 Jul 2022 16:00) (18 - 18)  SpO2: 98% (19 Jul 2022 08:19) (98% - 99%)        PHYSICAL EXAM:  GENERAL: NAD  NECK: Supple, No JVD  LUNG: Clear  HEART: S1, S2  ABDOMEN: Soft, Nontender, Nondistended; Bowel sounds present  EXTREMITIES: ++ edema, chronic venous stasis    LABS:  Labs:                        13.0   8.01  )-----------( 182      ( 18 Jul 2022 09:10 )             40.3             07-18    139  |  102  |  20  ----------------------------<  109<H>  3.9   |  23  |  1.4    Ca    9.2      18 Jul 2022 09:10  Mg     2.1     07-18    TPro  7.1  /  Alb  4.3  /  TBili  1.8<H>  /  DBili  x   /  AST  15  /  ALT  13  /  AlkPhos  143<H>  07-18    LIVER FUNCTIONS - ( 18 Jul 2022 09:10 )  Alb: 4.3 g/dL / Pro: 7.1 g/dL / ALK PHOS: 143 U/L / ALT: 13 U/L / AST: 15 U/L / GGT: x                   PT/INR - ( 18 Jul 2022 09:10 )   PT: 20.80 sec;   INR: 1.82 ratio            RADIOLOGY & ADDITIONAL TESTS:  < from: Xray Pelvis AP only (07.17.22 @ 10:00) >    TECHNIQUE: Single frontal view of the pelvis.    COMPARISON: None.    FINDINGS:    No acute fracture or dislocation. Degenerative change of the lower lumbar   spine and bilateral hip joints.    IMPRESSION:    No acute osseous abnormality.    --- End of Report ---      < end of copied text >      < from: Xray Chest 1 View AP/PA (07.17.22 @ 10:00) >  INTERPRETATION:  Clinical History / Reason for exam: Trauma.    Comparison : Chest radiograph dated November 27, 2021.    Technique/Positioning: Portable frontal.    Findings:    Support devices: Overlying EKG leads.    Cardiac/mediastinum/hilum: Stable cardiomegaly.    Lung parenchyma/Pleura: No lobar consolidation, pleural effusion or   pneumothorax.    Skeleton/soft tissues: Stable.    Impression:    No radiographic evidence of acute cardiopulmonary disease.        --- End of Report ---      < end of copied text >      < from: CT Abdomen and Pelvis w/ IV Cont (07.17.22 @ 08:21) >  FINDINGS:    CHEST:    LUNGS, PLEURA, AIRWAYS: Bibasilar subsegmental atelectasis. Patent   central tracheobronchial tree. No lobar consolidation, pleural effusion,   or pneumothorax. A 4 mm right lower lobe solid nodule is noted (9/171).    MEDIASTINUM/LYMPH NODES: Multiple subcentimeter and mildly enlarged   mediastinal lymph nodes, which are nonspecific.    HEART/GREAT VESSELS: Cardiomegaly. Ascending thoracic aorta is dilated to   4.5 cm at the level of the right main pulmonary artery. The main   pulmonary artery is normal in caliber.    ABDOMEN/PELVIS:    HEPATOBILIARY: Cholelithiasis.    SPLEEN: Unremarkable.    PANCREAS: Unremarkable.    ADRENAL GLANDS: Unremarkable.    KIDNEYS: Symmetric enhancement. No hydronephrosis. Bilateral renal cysts   and subcentimeter hypodensities too small to characterize.    ABDOMINOPELVIC NODES: No lymphadenopathy.    PELVIC ORGANS: Unremarkable.    PERITONEUM/MESENTERY/BOWEL: No bowel obstruction, ascites or   intraperitoneal free air. Unremarkable appendix.    BONES/SOFT TISSUES: Degenerative change of the spine. Severe degenerative   changes of the right glenohumeral joint. Large right inguinal scrotal   hernia containing fat and multiple partially imaged segments of   non obstructed bowel. Large left hydrocele.    OTHER: Atherosclerotic calcification.      IMPRESSION:    1.  No definite evidence of acute traumatic injury within the chest,   abdomen, or pelvis.    2.  Large right inguinal scrotal hernia containing fat and multiple   partially imaged segments of nonobstructed bowel.    3.  Large left hydrocele.    4.  Dilated ascending thoracic aorta, measuring 4.5 cm the level of the   right main pulmonary artery.    5.  4 mm right lower lobe solid nodule. In a high-risk patient, consider   12 months CT chest follow-up.    --- End of Report ---      < end of copied text >      < from: CT Cervical Spine No Cont (07.17.22 @ 07:53) >  FINDINGS:    There is reversed cervical lordosis with trace anterolisthesis of C4 on   C5.    There is no acute fracture, compression deformity or facet subluxation.    Vertebral body heights are uniform.    There are multilevel endplate degenerative changes as evidenced by disc   space narrowing, uncovertebral hypertrophy and facet arthropathy. This is   most pronouncedat C3-4, C4-5, and C5-6 levels, with associated varying   degrees of neural foraminal stenosis.    No significant spinal canal stenosis.    No prevertebral soft tissue swelling.      IMPRESSION:    No acute cervical spine fracture or subluxation.      < end of copied text >      < from: CT Head No Cont (07.17.22 @ 07:50) >  FINDINGS:    Redemonstration of diminished attenuation involving the left frontal and   temporal lobes, consistent with chronic infarct. The remaining gray-white   differentiation is maintained. Patchy areas of diminished attenuation are   seen in the subcortical and periventricular white matter without mass   effect compatible with moderate chronic microvascular changes.    The ventricles and cerebral sulci are prominent, consistent with   age-related parenchymal volume loss.    There is no evidence of acute intracranial hemorrhage, mass effect or   midline shift.    There is no fracture to the calvarium. Focus of air within the left   frontal scalp, consistent with overlying laceration. Mastoid air cells   are well aerated bilaterally. Mild scattered inflammatory mucosal   thickening in bilateral ethmoid sinuses.      IMPRESSION:    No acute intracranialpathology.    Stable moderate chronic microvascular ischemic changes.    Small left frontal scalp swelling.      < end of copied text >

## 2022-07-19 NOTE — PHYSICAL THERAPY INITIAL EVALUATION ADULT - PERTINENT HX OF CURRENT PROBLEM, REHAB EVAL
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 History was obtained from patient and his sister. Patient was walking and tripped over his feet causing him to fall down and hit his head on the ground. Patient's sister states there was blood coming from his head which prompted her to call 911.

## 2022-07-19 NOTE — PHYSICAL THERAPY INITIAL EVALUATION ADULT - PATIENT PROFILE REVIEW, REHAB EVAL
[facility] Service Attending Physician Supervision Note - Progress Note    I have seen and examined Erika Still personally and have reviewed the medical record independently  I have reviewed all components of the note performed and documented by the resident physician  I personally performed all the required components for patient evaluation and examined the patient  I was the supervising / collaborating physician on 01/22/2021 in the afternoon   I agree with the resident's findings and plan of care with the following additions / exceptions: Today, the patient mainly complains of persistently poor appetite and just feeling really run down and weak  The patient also has a nonproductive cough  He denies any significant shortness of breath  The patient's oxygen saturations were normal around 95-97% on room air  He denies any diarrhea or true vomiting or nausea  On exam, oral membranes only slightly dry  No pharyngeal erythema or exudates  Heart is with a regular rate rhythm normal S1 and S2 heart sounds  No obvious murmurs, rubs, or gallops  Lungs are clear to auscultation bilaterally without any wheezing, rhonchi, rales  Abdomen is soft, nondistended, nontender to palpation  Extremities are with no edema or calf tenderness  The patient seems somewhat anxious especially when we talk about potential discharge planning  The patient is sitting in the chair connected to IV fluids and on room air when I see him today  No conversational dyspnea  No use of accessory muscles for respiration  In terms of assessment and plan:  · COVID-19 Infection -   · Vitamin C, Vitamin D, Zinc   · Add remdesivir and possible steroid dosing if consistently requiring oxygen  However, currently when I see him this afternoon, he is off even the 1 liter of oxygen that he was on previously  · Incentive spirometer / encourage mobility  · Monitor PO intake  Encourage increased intake    No nausea per patient, just decreased appetite  · Oxygenation / ventilation - if able to remain off oxygen by tomorrow morning, we should check ambulatory oxygen saturations  If O2 sats ok, no temps > 102 and he has improvement in YOAV and PO intake, then can likely discharge  · Acute Kidney Injury -   · Baseline creatinine is around 1 - 1 1     · Continues on IV fluids tonight but likely can stop IV fluids by tomorrow morning as I anticipate renal function will be back at baseline  · Monitor I/O, particularly his ability to take liquid and food orally  · Avoid nephrotoxins  Losartan on hold  · Avoid hypotension  · CMP in am   · Elevated LFTs -   · Likely due to covid infection  · Trending downward  Continue to monitor with BMP in am   · No tenderness of the RUQ  · Hypertension -   · Losartan on hold due to YOAV  · IV hydralazine PRN  · Monitor blood pressures  Education and Discussions with Family / Patient:  Resident physician left voicemail for patient's wife    Time Spent for Care: 30 minutes  More than 50% of total time spent on counseling and coordination of care as described above  I attest that this information is true, accurate, and complete to the best of my knowledge      Tasneem Jimenez, DO yes

## 2022-07-20 VITALS
HEART RATE: 61 BPM | DIASTOLIC BLOOD PRESSURE: 77 MMHG | SYSTOLIC BLOOD PRESSURE: 141 MMHG | TEMPERATURE: 96 F | RESPIRATION RATE: 18 BRPM

## 2022-07-20 RX ORDER — WARFARIN SODIUM 2.5 MG/1
5 TABLET ORAL ONCE
Refills: 0 | Status: COMPLETED | OUTPATIENT
Start: 2022-07-20 | End: 2022-07-20

## 2022-07-20 RX ADMIN — GABAPENTIN 200 MILLIGRAM(S): 400 CAPSULE ORAL at 12:09

## 2022-07-20 RX ADMIN — Medication 150 MILLIGRAM(S): at 05:23

## 2022-07-20 RX ADMIN — AMLODIPINE BESYLATE 5 MILLIGRAM(S): 2.5 TABLET ORAL at 05:23

## 2022-07-20 RX ADMIN — WARFARIN SODIUM 5 MILLIGRAM(S): 2.5 TABLET ORAL at 15:51

## 2022-07-20 RX ADMIN — HEPARIN SODIUM 5000 UNIT(S): 5000 INJECTION INTRAVENOUS; SUBCUTANEOUS at 05:23

## 2022-07-20 RX ADMIN — Medication 125 MICROGRAM(S): at 05:23

## 2022-07-20 NOTE — DISCHARGE NOTE NURSING/CASE MANAGEMENT/SOCIAL WORK - NSDCPEFALRISK_GEN_ALL_CORE
For information on Fall & Injury Prevention, visit: [email or web address] OR  [email or web address] OR  https://www.cdc.gov/steadi/patient.html

## 2022-07-20 NOTE — DISCHARGE NOTE NURSING/CASE MANAGEMENT/SOCIAL WORK - PATIENT PORTAL LINK FT
You can access the FollowMyHealth Patient Portal offered by [facility] by registering at the following website: [email or web address]. By joining [facility]’s FollowMyHealth portal, you will also be able to view your health information using other applications (apps) compatible with our system.

## 2022-07-20 NOTE — PROGRESS NOTE ADULT - PROVIDER SPECIALTY LIST ADULT
Trauma Surgery
Internal Medicine

## 2022-07-20 NOTE — PROGRESS NOTE ADULT - SUBJECTIVE AND OBJECTIVE BOX
Chart reviewed, patient examined. Pertinent results reviewed.  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 History was obtained from patient and his sister. Patient was walking and tripped over his feet causing him to fall down and hit his head on the ground. Patient's sister states there was blood coming from his head which prompted her to call 911. Patient was unable to get up on his own and required help to stand back up. Last dose of Coumadin taken last night. In the ED, patient HD stable, ABCs intact, GCS 15, A&Ox2 (at baseline). Pt complains of head pain. Denies SOB, CP, N/V. External signs of trauma include a ~3.5cm left frontal laceration, repaired in the ED.     ED Vitals T(F): 97.7, HR: 70, BP: 178/89, RR: 18, SpO2: 99%  Labs significant for Hb 12.3, HCT 38.6, WBC 6.40, . Trops negx1, UA neg, lactate 1.6, Cr 1.7   140  |  105  |  27<H>  ----------------------------<  104<H>  4.1   |  21  |  1.7<H>       (17 Jul 2022 13:43)    MEDICATIONS  (STANDING):  amLODIPine   Tablet 5 milliGRAM(s) Oral daily  atorvastatin 20 milliGRAM(s) Oral at bedtime  digoxin     Tablet 125 MICROGram(s) Oral daily  gabapentin 200 milliGRAM(s) Oral daily  heparin   Injectable 5000 Unit(s) SubCutaneous every 12 hours  metoprolol succinate  milliGRAM(s) Oral daily    MEDICATIONS  (PRN):    INTERVAL EVENTS: Patient seen today without distress, admits to multiple falls in the past 3 months, this time with concern for head injury, 911 called. Patient's truma f/u negative.  Coumadin has been on hold, need to resume. PT evaluation done- noted: Rehab Facility recommended    Vital Signs Last 24 Hrs  T(C): 36.6 (20 Jul 2022 07:30), Max: 36.6 (20 Jul 2022 07:30)  T(F): 97.9 (20 Jul 2022 07:30), Max: 97.9 (20 Jul 2022 07:30)  HR: 64 (20 Jul 2022 07:30) (64 - 68)  BP: 159/87 (20 Jul 2022 07:30) (149/72 - 159/87)  BP(mean): --  RR: 18 (20 Jul 2022 07:30) (18 - 18)  SpO2: --    PHYSICAL EXAM:  GENERAL: NAD  NECK: Supple, No JVD  LUNG: Clear  HEART: IRR IRR;   ABDOMEN: Soft, Nontender, Nondistended; Bowel sounds present  EXTREMITIES: ++ edema, chronic venous stasis    LABS:  Labs:                        13.0   8.01  )-----------( 182      ( 18 Jul 2022 09:10 )             40.3             07-18    139  |  102  |  20  ----------------------------<  109<H>  3.9   |  23  |  1.4    Ca    9.2      18 Jul 2022 09:10  Mg     2.1     07-18    TPro  7.1  /  Alb  4.3  /  TBili  1.8<H>  /  DBili  x   /  AST  15  /  ALT  13  /  AlkPhos  143<H>  07-18    LIVER FUNCTIONS - ( 18 Jul 2022 09:10 )  Alb: 4.3 g/dL / Pro: 7.1 g/dL / ALK PHOS: 143 U/L / ALT: 13 U/L / AST: 15 U/L / GGT: x                   PT/INR - ( 18 Jul 2022 09:10 )   PT: 20.80 sec;   INR: 1.82 ratio            RADIOLOGY & ADDITIONAL TESTS:  < from: Xray Pelvis AP only (07.17.22 @ 10:00) >    TECHNIQUE: Single frontal view of the pelvis.    COMPARISON: None.    FINDINGS:    No acute fracture or dislocation. Degenerative change of the lower lumbar   spine and bilateral hip joints.    IMPRESSION:    No acute osseous abnormality.    --- End of Report ---      < end of copied text >      < from: Xray Chest 1 View AP/PA (07.17.22 @ 10:00) >  INTERPRETATION:  Clinical History / Reason for exam: Trauma.    Comparison : Chest radiograph dated November 27, 2021.    Technique/Positioning: Portable frontal.    Findings:    Support devices: Overlying EKG leads.    Cardiac/mediastinum/hilum: Stable cardiomegaly.    Lung parenchyma/Pleura: No lobar consolidation, pleural effusion or   pneumothorax.    Skeleton/soft tissues: Stable.    Impression:    No radiographic evidence of acute cardiopulmonary disease.        --- End of Report ---      < end of copied text >      < from: CT Abdomen and Pelvis w/ IV Cont (07.17.22 @ 08:21) >  FINDINGS:    CHEST:    LUNGS, PLEURA, AIRWAYS: Bibasilar subsegmental atelectasis. Patent   central tracheobronchial tree. No lobar consolidation, pleural effusion,   or pneumothorax. A 4 mm right lower lobe solid nodule is noted (9/171).    MEDIASTINUM/LYMPH NODES: Multiple subcentimeter and mildly enlarged   mediastinal lymph nodes, which are nonspecific.    HEART/GREAT VESSELS: Cardiomegaly. Ascending thoracic aorta is dilated to   4.5 cm at the level of the right main pulmonary artery. The main   pulmonary artery is normal in caliber.    ABDOMEN/PELVIS:    HEPATOBILIARY: Cholelithiasis.    SPLEEN: Unremarkable.    PANCREAS: Unremarkable.    ADRENAL GLANDS: Unremarkable.    KIDNEYS: Symmetric enhancement. No hydronephrosis. Bilateral renal cysts   and subcentimeter hypodensities too small to characterize.    ABDOMINOPELVIC NODES: No lymphadenopathy.    PELVIC ORGANS: Unremarkable.    PERITONEUM/MESENTERY/BOWEL: No bowel obstruction, ascites or   intraperitoneal free air. Unremarkable appendix.    BONES/SOFT TISSUES: Degenerative change of the spine. Severe degenerative   changes of the right glenohumeral joint. Large right inguinal scrotal   hernia containing fat and multiple partially imaged segments of   non obstructed bowel. Large left hydrocele.    OTHER: Atherosclerotic calcification.      IMPRESSION:    1.  No definite evidence of acute traumatic injury within the chest,   abdomen, or pelvis.    2.  Large right inguinal scrotal hernia containing fat and multiple   partially imaged segments of nonobstructed bowel.    3.  Large left hydrocele.    4.  Dilated ascending thoracic aorta, measuring 4.5 cm the level of the   right main pulmonary artery.    5.  4 mm right lower lobe solid nodule. In a high-risk patient, consider   12 months CT chest follow-up.    --- End of Report ---      < end of copied text >      < from: CT Cervical Spine No Cont (07.17.22 @ 07:53) >  FINDINGS:    There is reversed cervical lordosis with trace anterolisthesis of C4 on   C5.    There is no acute fracture, compression deformity or facet subluxation.    Vertebral body heights are uniform.    There are multilevel endplate degenerative changes as evidenced by disc   space narrowing, uncovertebral hypertrophy and facet arthropathy. This is   most pronouncedat C3-4, C4-5, and C5-6 levels, with associated varying   degrees of neural foraminal stenosis.    No significant spinal canal stenosis.    No prevertebral soft tissue swelling.      IMPRESSION:    No acute cervical spine fracture or subluxation.      < end of copied text >      < from: CT Head No Cont (07.17.22 @ 07:50) >  FINDINGS:    Redemonstration of diminished attenuation involving the left frontal and   temporal lobes, consistent with chronic infarct. The remaining gray-white   differentiation is maintained. Patchy areas of diminished attenuation are   seen in the subcortical and periventricular white matter without mass   effect compatible with moderate chronic microvascular changes.    The ventricles and cerebral sulci are prominent, consistent with   age-related parenchymal volume loss.    There is no evidence of acute intracranial hemorrhage, mass effect or   midline shift.    There is no fracture to the calvarium. Focus of air within the left   frontal scalp, consistent with overlying laceration. Mastoid air cells   are well aerated bilaterally. Mild scattered inflammatory mucosal   thickening in bilateral ethmoid sinuses.      IMPRESSION:    No acute intracranialpathology.    Stable moderate chronic microvascular ischemic changes.    Small left frontal scalp swelling.      < end of copied text >   Chart reviewed, patient examined. Pertinent results reviewed.  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 History was obtained from patient and his sister. Patient was walking and tripped over his feet causing him to fall down and hit his head on the ground. Patient's sister states there was blood coming from his head which prompted her to call 911. Patient was unable to get up on his own and required help to stand back up. Last dose of Coumadin taken last night. In the ED, patient HD stable, ABCs intact, GCS 15, A&Ox2 (at baseline). Pt complains of head pain. Denies SOB, CP, N/V. External signs of trauma include a ~3.5cm left frontal laceration, repaired in the ED.     ED Vitals T(F): 97.7, HR: 70, BP: 178/89, RR: 18, SpO2: 99%  Labs significant for Hb 12.3, HCT 38.6, WBC 6.40, . Trops negx1, UA neg, lactate 1.6, Cr 1.7   140  |  105  |  27<H>  ----------------------------<  104<H>  4.1   |  21  |  1.7<H>       (17 Jul 2022 13:43)    MEDICATIONS  (STANDING):  amLODIPine   Tablet 5 milliGRAM(s) Oral daily  atorvastatin 20 milliGRAM(s) Oral at bedtime  digoxin     Tablet 125 MICROGram(s) Oral daily  gabapentin 200 milliGRAM(s) Oral daily  heparin   Injectable 5000 Unit(s) SubCutaneous every 12 hours  metoprolol succinate  milliGRAM(s) Oral daily    MEDICATIONS  (PRN):    INTERVAL EVENTS: Patient seen today without distress, admits to multiple falls in the past 3 months, this time with concern for head injury, 911 called. Patient's truma f/u negative.  Coumadin has been on hold, need to resume. PT evaluation done- noted: Rehab Facility recommended    Vital Signs Last 24 Hrs  T(C): 36.6 (20 Jul 2022 07:30), Max: 36.6 (20 Jul 2022 07:30)  T(F): 97.9 (20 Jul 2022 07:30), Max: 97.9 (20 Jul 2022 07:30)  HR: 64 (20 Jul 2022 07:30) (64 - 68)  BP: 159/87 (20 Jul 2022 07:30) (149/72 - 159/87)  BP(mean): --  RR: 18 (20 Jul 2022 07:30) (18 - 18)  SpO2: --    PHYSICAL EXAM:  GENERAL: NAD  NECK: Supple, No JVD  LUNG: Clear  HEART: IRR IRR;   ABDOMEN: Soft, Nontender, Nondistended; Bowel sounds present  EXTREMITIES: ++ edema, chronic venous stasis    LABS:  Labs:                        13.0   8.01  )-----------( 182      ( 18 Jul 2022 09:10 )             40.3             07-18    139  |  102  |  20  ----------------------------<  109<H>  3.9   |  23  |  1.4    Ca    9.2      18 Jul 2022 09:10  Mg     2.1     07-18    TPro  7.1  /  Alb  4.3  /  TBili  1.8<H>  /  DBili  x   /  AST  15  /  ALT  13  /  AlkPhos  143<H>  07-18    LIVER FUNCTIONS - ( 18 Jul 2022 09:10 )  Alb: 4.3 g/dL / Pro: 7.1 g/dL / ALK PHOS: 143 U/L / ALT: 13 U/L / AST: 15 U/L / GGT: x                   PT/INR - ( 18 Jul 2022 09:10 )   PT: 20.80 sec;   INR: 1.82 ratio            RADIOLOGY & ADDITIONAL TESTS:  < from: Xray Pelvis AP only (07.17.22 @ 10:00) >    TECHNIQUE: Single frontal view of the pelvis.    COMPARISON: None.    FINDINGS:    No acute fracture or dislocation. Degenerative change of the lower lumbar   spine and bilateral hip joints.    IMPRESSION:    No acute osseous abnormality.    --- End of Report ---      < end of copied text >      < from: Xray Chest 1 View AP/PA (07.17.22 @ 10:00) >  INTERPRETATION:  Clinical History / Reason for exam: Trauma.    Comparison : Chest radiograph dated November 27, 2021.    Technique/Positioning: Portable frontal.    Findings:    Support devices: Overlying EKG leads.    Cardiac/mediastinum/hilum: Stable cardiomegaly.    Lung parenchyma/Pleura: No lobar consolidation, pleural effusion or   pneumothorax.    Skeleton/soft tissues: Stable.    Impression:    No radiographic evidence of acute cardiopulmonary disease.        --- End of Report ---      < end of copied text >      < from: CT Abdomen and Pelvis w/ IV Cont (07.17.22 @ 08:21) >  FINDINGS:    CHEST:    LUNGS, PLEURA, AIRWAYS: Bibasilar subsegmental atelectasis. Patent   central tracheobronchial tree. No lobar consolidation, pleural effusion,   or pneumothorax. A 4 mm right lower lobe solid nodule is noted (9/171).    MEDIASTINUM/LYMPH NODES: Multiple subcentimeter and mildly enlarged   mediastinal lymph nodes, which are nonspecific.    HEART/GREAT VESSELS: Cardiomegaly. Ascending thoracic aorta is dilated to   4.5 cm at the level of the right main pulmonary artery. The main   pulmonary artery is normal in caliber.    ABDOMEN/PELVIS:    HEPATOBILIARY: Cholelithiasis.    SPLEEN: Unremarkable.    PANCREAS: Unremarkable.    ADRENAL GLANDS: Unremarkable.    KIDNEYS: Symmetric enhancement. No hydronephrosis. Bilateral renal cysts   and subcentimeter hypodensities too small to characterize.    ABDOMINOPELVIC NODES: No lymphadenopathy.    PELVIC ORGANS: Unremarkable.    PERITONEUM/MESENTERY/BOWEL: No bowel obstruction, ascites or   intraperitoneal free air. Unremarkable appendix.    BONES/SOFT TISSUES: Degenerative change of the spine. Severe degenerative   changes of the right glenohumeral joint. Large right inguinal scrotal   hernia containing fat and multiple partially imaged segments of   non obstructed bowel. Large left hydrocele.    OTHER: Atherosclerotic calcification.      IMPRESSION:    1.  No definite evidence of acute traumatic injury within the chest,   abdomen, or pelvis.    2.  Large right inguinal scrotal hernia containing fat and multiple   partially imaged segments of nonobstructed bowel.    3.  Large left hydrocele.    4.  Dilated ascending thoracic aorta, measuring 4.5 cm the level of the   right main pulmonary artery.    5.  4 mm right lower lobe solid nodule. In a high-risk patient, consider   12 months CT chest follow-up.    --- End of Report ---      < end of copied text >      < from: CT Cervical Spine No Cont (07.17.22 @ 07:53) >  FINDINGS:    There is reversed cervical lordosis with trace anterolisthesis of C4 on   C5.    There is no acute fracture, compression deformity or facet subluxation.    Vertebral body heights are uniform.    There are multilevel endplate degenerative changes as evidenced by disc   space narrowing, uncovertebral hypertrophy and facet arthropathy. This is   most pronouncedat C3-4, C4-5, and C5-6 levels, with associated varying   degrees of neural foraminal stenosis.    No significant spinal canal stenosis.    No prevertebral soft tissue swelling.      IMPRESSION:    No acute cervical spine fracture or subluxation.      < end of copied text >      < from: CT Head No Cont (07.17.22 @ 07:50) >  FINDINGS:    Redemonstration of diminished attenuation involving the left frontal and   temporal lobes, consistent with chronic infarct. The remaining gray-white   differentiation is maintained. Patchy areas of diminished attenuation are   seen in the subcortical and periventricular white matter without mass   effect compatible with moderate chronic microvascular changes.    The ventricles and cerebral sulci are prominent, consistent with   age-related parenchymal volume loss.    There is no evidence of acute intracranial hemorrhage, mass effect or   midline shift.    There is no fracture to the calvarium. Focus of air within the left   frontal scalp, consistent with overlying laceration. Mastoid air cells   are well aerated bilaterally. Mild scattered inflammatory mucosal   thickening in bilateral ethmoid sinuses.      IMPRESSION:    No acute intracranialpathology.    Stable moderate chronic microvascular ischemic changes.    Small left frontal scalp swelling.      < end of copied text >

## 2022-07-20 NOTE — DISCHARGE NOTE NURSING/CASE MANAGEMENT/SOCIAL WORK - BRAND OF FIRST COVID-19 BOOSTER
Chief complaint:   Chief Complaint   Patient presents with   • Establish Care     prescription questions       Vitals:  Visit Vitals  /70   Pulse 69   Temp 97.6 °F (36.4 °C) (Temporal)   Resp 12   Ht 5' 5.5\" (1.664 m)   Wt 66.5 kg   SpO2 99%   BMI 24.02 kg/m²       HISTORY OF PRESENT ILLNESS     Patient here to establish care.  Previous PCP at [facility].  Also requesting Medicare wellness, see additional note.  History significant for hypothyroid.  Currently taking 75 mcg daily.  Reports she has been under well control.  Likes to get 150 mg tablets and break them in half.  Denies any fatigue or weight fluctuation.  Feel like meds are working well.  Also history of hyperlipidemia and vitamin-D deficiency.  Takes a vitamin-D supplement over-the-counter.  Healthy diet.  No cholesterol medications.  Denies any acute illness.  No fevers.  Bowel movements and urine normal.      Other significant problems:  Patient Active Problem List    Diagnosis Date Noted   • Vitamin D deficiency      Priority: Low   • Hypothyroid      Priority: Low   • Hyperlipidemia      Priority: Low       PAST MEDICAL, FAMILY AND SOCIAL HISTORY     Medications:  Current Outpatient Medications   Medication   • Synthroid 150 MCG tablet     No current facility-administered medications for this visit.        Allergies:  ALLERGIES:  Not on File    Past Medical  History/Surgeries:  Past Medical History:   Diagnosis Date   • Hyperlipidemia    • Hypothyroid    • Vitamin D deficiency        Past Surgical History:   Procedure Laterality Date   • Appendectomy     • Hysterectomy         Family History:  Family History   Problem Relation Age of Onset   • Hypertension Mother    • Hypothyroid Mother    • Diabetes Father        Social History:  Social History     Tobacco Use   • Smoking status: Never Smoker   • Smokeless tobacco: Never Used   Substance Use Topics   • Alcohol use: Never     Frequency: Never       REVIEW OF SYSTEMS     Review of Systems    Constitutional: Negative for chills, fatigue and fever.   HENT: Negative.    Eyes: Negative for photophobia and visual disturbance.   Respiratory: Negative.    Cardiovascular: Negative.    Gastrointestinal: Negative.    Genitourinary: Negative.    Musculoskeletal: Negative.    Skin: Negative.    Neurological: Negative for dizziness, light-headedness and headaches.   Psychiatric/Behavioral: Negative.        PHYSICAL EXAM     Physical Exam  Vitals signs and nursing note reviewed.   Constitutional:       Appearance: Normal appearance.   HENT:      Head: Normocephalic and atraumatic.      Nose: Nose normal.      Mouth/Throat:      Mouth: Mucous membranes are moist.      Pharynx: Oropharynx is clear.   Eyes:      Extraocular Movements: Extraocular movements intact.      Pupils: Pupils are equal, round, and reactive to light.   Neck:      Thyroid: No thyroid mass, thyromegaly or thyroid tenderness.   Cardiovascular:      Rate and Rhythm: Normal rate and regular rhythm.      Pulses: Normal pulses.      Heart sounds: Normal heart sounds.   Pulmonary:      Breath sounds: Normal breath sounds.   Abdominal:      General: Bowel sounds are normal.      Palpations: Abdomen is soft.   Skin:     General: Skin is warm and dry.   Neurological:      General: No focal deficit present.      Mental Status: She is alert.   Psychiatric:         Mood and Affect: Mood normal.         Behavior: Behavior normal.         ASSESSMENT/PLAN     1. Medicare annual wellness visit, subsequent  See additional note today    2. Acquired hypothyroidism  History of hypothyroid.  Reviewed records and history through Care everywhere.  Thyroid labs ordered.  Continue Synthroid 75 mcg daily.  Adjust meds pending lab results.  - THYROID STIMULATING HORMONE; Future  - FREE T4; Future  - Synthroid 150 MCG tablet; Take 0.5 tablets by mouth daily.  Dispense: 90 tablet; Refill: 3    3. Mixed hyperlipidemia  Reviewed history and records.  Fasting labs ordered.   Educated healthy diet  - LIPID PANEL WITH REFLEX; Future  - COMPREHENSIVE METABOLIC PANEL; Future  - CBC WITH DIFFERENTIAL; Future    4. Low vitamin D level  Reviewed history.  Labs ordered.  Continue supplementation and adjust dosing if needed per results  - VITAMIN D -25 HYDROXY; Future    5. Encounter for screening mammogram for malignant neoplasm of breast  - MAMMO SCREENING BILATERAL; Future    6. Post-menopause  - BD DEXA AXIAL SKELETON; Future    7. Osteoporosis screening  - BD DEXA AXIAL SKELETON; Future    8. Vitamin D deficiency  - VITAMIN D -25 HYDROXY; Future    9. Establishing care with new doctor, encounter for  Establish care     [employer]

## 2022-07-20 NOTE — DISCHARGE NOTE NURSING/CASE MANAGEMENT/SOCIAL WORK - NSDCVIVACCINE_GEN_ALL_CORE_FT
Tdap; 17-Jul-2022 09:05; Chasity Frederick (RN); [employer]; [id number] (Exp. Date: 11-Mar-2024); IntraMuscular; Deltoid Right.; 0.5 milliLiter(s); VIS (VIS Published: 09-May-2013, VIS Presented: 17-Jul-2022);

## 2022-07-20 NOTE — PROGRESS NOTE ADULT - SUBJECTIVE AND OBJECTIVE BOX
MARCELO BASSETT 71y Male  MRN#: [id number]   CODE STATUS:___full    Hospital Day: 3d    Pt is currently admitted with the primary diagnosis of mechanical fall    SUBJECTIVE  Hospital Course  This is a 72 y/o M w/ PMHx of HTN, HLD, Afib on coumadin and CVA x 15 years ago with residual Rt sided weakness presented to the ED s/p fall, +HT, -LOC, +AC (Coumadin). History was obtained from patient and his sister. Patient was walking and tripped over his feet causing him to fall down and hit his head on the ground. Patient's sister states there was blood coming from his head which prompted her to call 911. Patient was unable to get up on his own and required help to stand back up. Last dose of Coumadin taken last night. In the ED, patient HD stable, ABCs intact, GCS 15, A&Ox2 (at baseline). Pt complains of head pain. Denies SOB, CP, N/V. External signs of trauma include a ~3.5cm left frontal laceration, repaired in the ED.     Overnight events     NAEO    Subjective complaints     Patient seen and examined at bed side. Patient reports to be doing well no complaints.                                                OBJECTIVE  PAST MEDICAL & SURGICAL HISTORY  Sciatica    HTN (hypertension)    Afib    Right sided weakness    CVA (cerebrovascular accident)  CVA 12 years ago with right sided weakness, uses four prong cane    No significant past surgical history                                                ALLERGIES:  No Known Allergies                           HOME MEDICATIONS  Home Medications:  acetaminophen 325 mg oral tablet: 2 tab(s) orally every 6 hours, As needed, Temp greater or equal to 38C (100.4F), Mild Pain (1 - 3) (17 Jul 2022 13:51)  amLODIPine 5 mg oral tablet: 1 tab(s) orally once a day (17 Jul 2022 13:51)  atorvastatin 20 mg oral tablet: 1 tab(s) orally once a day (at bedtime) (17 Jul 2022 13:51)  digoxin 125 mcg (0.125 mg) oral tablet: 1 tab(s) orally once a day (17 Jul 2022 13:51)  enalapril 10 mg oral tablet: 2 tab(s) orally QAM, 1 tab orally QHS (17 Jul 2022 13:51)  furosemide 20 mg oral tablet: 1 tab(s) orally once a day (17 Jul 2022 13:51)  gabapentin 100 mg oral capsule: 2 cap(s) orally once a day (at bedtime) (17 Jul 2022 13:51)  metoprolol succinate 50 mg oral tablet, extended release: 3 tab(s) orally once a day (17 Jul 2022 13:51)  warfarin 2.5 mg oral tablet: 1 tab(s) orally once a day (at bedtime) (17 Jul 2022 13:51)                           MEDICATIONS:  STANDING MEDICATIONS  amLODIPine   Tablet 5 milliGRAM(s) Oral daily  atorvastatin 20 milliGRAM(s) Oral at bedtime  digoxin     Tablet 125 MICROGram(s) Oral daily  gabapentin 200 milliGRAM(s) Oral daily  heparin   Injectable 5000 Unit(s) SubCutaneous every 12 hours  metoprolol succinate  milliGRAM(s) Oral daily  warfarin 5 milliGRAM(s) Oral once    PRN MEDICATIONS                                            ------------------------------------------------------------  VITAL SIGNS: Last 24 Hours  T(C): 36.6 (20 Jul 2022 07:30), Max: 36.6 (20 Jul 2022 07:30)  T(F): 97.9 (20 Jul 2022 07:30), Max: 97.9 (20 Jul 2022 07:30)  HR: 64 (20 Jul 2022 07:30) (64 - 68)  BP: 159/87 (20 Jul 2022 07:30) (149/72 - 159/87)  BP(mean): --  RR: 18 (20 Jul 2022 07:30) (18 - 18)  SpO2: --      07-20-22 @ 07:01  -  07-20-22 @ 11:06  --------------------------------------------------------  IN: 200 mL / OUT: 0 mL / NET: 200 mL                                               LABS:                                                                    RADIOLOGY:     DUPLEX SCAN EXT VEINS LOWER BI                          PROCEDURE DATE:  07/19/2022    IMPRESSION:  No evidence of deep venous thrombosis in either lower extremity.    PHYSICAL EXAM:  GENERAL: NAD, lying in bed comfortably  HEAD:  Atraumatic, Normocephalic  EYES: EOMI, PERRLA, conjunctiva and sclera clear  ENT: Moist mucous membranes  NECK: Supple, No JVD  CHEST/LUNG: Clear to auscultation bilaterally; No rales, rhonchi, wheezing, or rubs. Unlabored respirations  HEART: Regular rate and rhythm; No murmurs, rubs, or gallops  ABDOMEN: BSx4; Soft, nontender, nondistended  EXTREMITIES:  2+ Peripheral Pulses, brisk capillary refill. No clubbing, cyanosis, or edema  NERVOUS SYSTEM:  A&Ox3, no focal deficits   SKIN: No rashes or lesions                                         ASSESSMENT & PLAN      PHYSICAL EXAM:  GENERAL: NAD, lying in bed comfortably  HEAD:  Atraumatic, Normocephalic  EYES: EOMI, PERRLA, conjunctiva and sclera clear  ENT: Moist mucous membranes  NECK: Supple, No JVD  CHEST/LUNG: Clear to auscultation bilaterally; No rales, rhonchi, wheezing, or rubs. Unlabored respirations  HEART: Regular rate and rhythm; No murmurs, rubs, or gallops  ABDOMEN: BSx4; Soft, nontender, nondistended  EXTREMITIES:  2+ Peripheral Pulses, brisk capillary refill. No clubbing, cyanosis, or edema  NERVOUS SYSTEM:  A&Ox3, no focal deficits   SKIN: No rashes or lesions                                         ASSESSMENT & PLAN    This is a 72 y/o M w/ PMHx of HTN, HLD, Afib on coumadin and CVA x 15 years ago with residual Rt sided weakness presented to the ED s/p fall, +HT, -LOC, +AC (Coumadin).     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   - CT H + N negative for intracranial pathology, cervical spine fracture or subluxation  - CT Chest, Ab/Pelvis negative for  acute traumatic injury  - F/u Orthostatic vitals   - F/u B12, Folate   - PT evaluation   - UA negative   - Trops Negative   - Lactate 1.6   - needs PT    #Normocytic anemia   - Hb 12.3  - F/u Iron panel, B12, Folate     #?JOHN on CKD IIIa  - Cr 1.7 (Last admission Cr was also 1.7)  - Avoid nephrotoxic agents   - check ua / urine electrolytes + pr:cr  - trend bmp / scr  - nephro eval if no improvement     #Afib on Coumadin   - on Digoxin   - Monitor Daily INR for Coumadin dosing   - c/w Metoprolol 150mg qd  - restarted coumadin 5mg  - F/U with coumadin clinic    #HTN   - c/w Amlodipine   - On enalapril 20mg in the AM and 10mg in the PM, will hold for now given ?JOHN   - On Lasix 20mg QD, Hold due to JOHN     #HLD  - F/u Lipid panel   - c/w statin     #Hx of Sciatica   - c/w gabapentin     #Chronic LE venous statis  - F/u wound care recs     #4 mm right lower lobe solid nodule - seen on CT   - F/u as outpatient  - Repeat CT in 12 months     #Misc  - DVT prophylaxis: Heparin sq  - GI prophylaxis: not indicated  - Diet: DASH   - Activity status: IAT   - Disposition: Admit to medicine                          MARCELO BASSETT 71y Male  MRN#: [id number]   CODE STATUS:___full    Hospital Day: 3d    Pt is currently admitted with the primary diagnosis of mechanical fall    SUBJECTIVE  Hospital Course  This is a 70 y/o M w/ PMHx of HTN, HLD, Afib on coumadin and CVA x 15 years ago with residual Rt sided weakness presented to the ED s/p fall, +HT, -LOC, +AC (Coumadin). History was obtained from patient and his sister. Patient was walking and tripped over his feet causing him to fall down and hit his head on the ground. Patient's sister states there was blood coming from his head which prompted her to call 911. Patient was unable to get up on his own and required help to stand back up. Last dose of Coumadin taken last night. In the ED, patient HD stable, ABCs intact, GCS 15, A&Ox2 (at baseline). Pt complains of head pain. Denies SOB, CP, N/V. External signs of trauma include a ~3.5cm left frontal laceration, repaired in the ED.     Overnight events     NAEO    Subjective complaints     Patient seen and examined at bed side. Patient reports to be doing well no complaints.                                                OBJECTIVE  PAST MEDICAL & SURGICAL HISTORY  Sciatica    HTN (hypertension)    Afib    Right sided weakness    CVA (cerebrovascular accident)  CVA 12 years ago with right sided weakness, uses four prong cane    No significant past surgical history                                                ALLERGIES:  No Known Allergies                           HOME MEDICATIONS  Home Medications:  acetaminophen 325 mg oral tablet: 2 tab(s) orally every 6 hours, As needed, Temp greater or equal to 38C (100.4F), Mild Pain (1 - 3) (17 Jul 2022 13:51)  amLODIPine 5 mg oral tablet: 1 tab(s) orally once a day (17 Jul 2022 13:51)  atorvastatin 20 mg oral tablet: 1 tab(s) orally once a day (at bedtime) (17 Jul 2022 13:51)  digoxin 125 mcg (0.125 mg) oral tablet: 1 tab(s) orally once a day (17 Jul 2022 13:51)  enalapril 10 mg oral tablet: 2 tab(s) orally QAM, 1 tab orally QHS (17 Jul 2022 13:51)  furosemide 20 mg oral tablet: 1 tab(s) orally once a day (17 Jul 2022 13:51)  gabapentin 100 mg oral capsule: 2 cap(s) orally once a day (at bedtime) (17 Jul 2022 13:51)  metoprolol succinate 50 mg oral tablet, extended release: 3 tab(s) orally once a day (17 Jul 2022 13:51)  warfarin 2.5 mg oral tablet: 1 tab(s) orally once a day (at bedtime) (17 Jul 2022 13:51)                           MEDICATIONS:  STANDING MEDICATIONS  amLODIPine   Tablet 5 milliGRAM(s) Oral daily  atorvastatin 20 milliGRAM(s) Oral at bedtime  digoxin     Tablet 125 MICROGram(s) Oral daily  gabapentin 200 milliGRAM(s) Oral daily  heparin   Injectable 5000 Unit(s) SubCutaneous every 12 hours  metoprolol succinate  milliGRAM(s) Oral daily  warfarin 5 milliGRAM(s) Oral once    PRN MEDICATIONS                                            ------------------------------------------------------------  VITAL SIGNS: Last 24 Hours  T(C): 36.6 (20 Jul 2022 07:30), Max: 36.6 (20 Jul 2022 07:30)  T(F): 97.9 (20 Jul 2022 07:30), Max: 97.9 (20 Jul 2022 07:30)  HR: 64 (20 Jul 2022 07:30) (64 - 68)  BP: 159/87 (20 Jul 2022 07:30) (149/72 - 159/87)  BP(mean): --  RR: 18 (20 Jul 2022 07:30) (18 - 18)  SpO2: --      07-20-22 @ 07:01  -  07-20-22 @ 11:06  --------------------------------------------------------  IN: 200 mL / OUT: 0 mL / NET: 200 mL                                               LABS:                                                                    RADIOLOGY:     DUPLEX SCAN EXT VEINS LOWER BI                          PROCEDURE DATE:  07/19/2022    IMPRESSION:  No evidence of deep venous thrombosis in either lower extremity.      PHYSICAL EXAM:  GENERAL: NAD, lying in bed comfortably  HEAD:  Atraumatic, Normocephalic  EYES: EOMI, PERRLA, conjunctiva and sclera clear  ENT: Moist mucous membranes  NECK: Supple, No JVD  CHEST/LUNG: Clear to auscultation bilaterally; No rales, rhonchi, wheezing, or rubs. Unlabored respirations  HEART: Regular rate and rhythm; No murmurs, rubs, or gallops  ABDOMEN: BSx4; Soft, nontender, nondistended  EXTREMITIES:   No clubbing, cyanosis, or edema  NERVOUS SYSTEM:  A&Ox3, no focal deficits   SKIN: No rashes or lesions                                         ASSESSMENT & PLAN    This is a 70 y/o M w/ PMHx of HTN, HLD, Afib on coumadin and CVA x 15 years ago with residual Rt sided weakness presented to the ED s/p fall, +HT, -LOC, +AC (Coumadin).     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   - CT H + N negative for intracranial pathology, cervical spine fracture or subluxation  - CT Chest, Ab/Pelvis negative for  acute traumatic injury  - F/u Orthostatic vitals   - F/u B12, Folate   - PT evaluation   - UA negative   - Trops Negative   - Lactate 1.6   - needs PT    #Normocytic anemia   - Hb 12.3  - F/u Iron panel, B12, Folate     #?JOHN on CKD IIIa  - Cr 1.7 (Last admission Cr was also 1.7)  - Avoid nephrotoxic agents   - check ua / urine electrolytes + pr:cr  - trend bmp / scr  - nephro eval if no improvement     #Afib on Coumadin   - on Digoxin   - Monitor Daily INR for Coumadin dosing   - c/w Metoprolol 150mg qd  - restarted coumadin 5mg  - F/U with coumadin clinic    #HTN   - c/w Amlodipine   - On enalapril 20mg in the AM and 10mg in the PM, will hold for now given ?JOHN   - On Lasix 20mg QD, Hold due to JOHN     #HLD  - F/u Lipid panel   - c/w statin     #Hx of Sciatica   - c/w gabapentin     #Chronic LE venous statis  - F/u wound care recs     #4 mm right lower lobe solid nodule - seen on CT   - F/u as outpatient  - Repeat CT in 12 months     #Misc  - DVT prophylaxis: Heparin sq  - GI prophylaxis: not indicated  - Diet: DASH   - Activity status: IAT   - Disposition: D/C to [facility]

## 2022-07-20 NOTE — PROGRESS NOTE ADULT - ASSESSMENT
ASSESSMENT & PLAN    This is a 70 y/o M w/ PMHx of HTN, HLD, Afib on coumadin and CVA x 15 years ago with residual Rt sided weakness presented to the ED s/p fall, +HT, -LOC, +AC (Coumadin).     Mechanical fall  Hx of CVA with residual Rt sided weakness and expressive aphasia  - +HT, -LOC, +AC (Coumadin)  - Negative trauma workup   - 3.5cm left frontal laceration, repaired in the ED.   - CT H + N negative for intracranial pathology, cervical spine fracture or subluxation  - CT Chest, Ab/Pelvis negative for  acute traumatic injury  - F/u Orthostatic vitals   - PT evaluation   - needs PT    Normocytic anemia   - Hb 12.3  - F/u Iron panel, B12, Folate     JOHN on CKD IIIa  - Cr 1.7   - Avoid nephrotoxic agents   - avoid dehydration    Afib on Coumadin   - on Digoxin   - Monitor Daily INR for Coumadin dosing   - continue Metoprolol 150mg qd  - resume Coumadin dose    HTN   - on Amlodipine   - Enalapril 20mg in the AM and 10mg in the PM, will hold for now given ?JOHN   - On Lasix 20mg QD, Hold due to JOHN     HLD  - continue statin     Hx of Chronic Back Pain /Sciatica   - stable on Gabapentin     Chronic LE venous statis  - local care  - resume diuretic    #4 mm right lower lobe solid nodule - seen on CT   - F/u as outpatient  - Repeat CT in 12 months       - DVT prophylaxis: Heparin sq  - GI prophylaxis: not indicated  - Diet: DASH   - Activity status: IAT   - Disposition: D/C planning home with services vs [facility] rehab. Patient agrees to [facility], has been there twice before                                         ASSESSMENT & PLAN    This is a 70 y/o M w/ PMHx of HTN, HLD, Afib on coumadin and CVA x 15 years ago with residual Rt sided weakness presented to the ED s/p fall, +HT, -LOC, +AC (Coumadin).     Mechanical fall  Hx of CVA with residual Rt sided weakness and expressive aphasia  - +HT, -LOC, +AC (Coumadin)  - Negative trauma workup   - 3.5cm left frontal laceration, repaired in the ED.   - CT H + N negative for intracranial pathology, cervical spine fracture or subluxation  - CT Chest, Ab/Pelvis negative for  acute traumatic injury  - F/u Orthostatic vitals   - PT evaluation   - needs PT    Normocytic anemia   - Hb 12.3  - F/u Iron panel, B12, Folate     JOHN on CKD IIIa  - Cr 1.7   - Avoid nephrotoxic agents   - avoid dehydration    Afib on Coumadin   - on Digoxin   - Monitor Daily INR for Coumadin dosing   - continue Metoprolol 150mg qd  - resume Coumadin dose    HTN   - on Amlodipine   - Enalapril 20mg in the AM and 10mg in the PM, will hold for now given ?JOHN   - On Lasix 20mg QD, Hold due to JOHN     HLD  - continue statin     Hx of Chronic Back Pain /Sciatica   - stable on Gabapentin     Chronic LE venous statis  - local care  - resume diuretic    #4 mm right lower lobe solid nodule - seen on CT   - F/u as outpatient  - Repeat CT in 12 months       - DVT prophylaxis: restart AC Rx w warfarin; check PT  - GI prophylaxis: not indicated  - Diet: DASH   - Activity status: IAT   - Disposition: D/C planning home with services vs SA rehab. After prolonged d/w pt and his sister-Patient agrees to [facility], has been there twice before; DC planning

## 2022-07-29 DIAGNOSIS — R91.1 SOLITARY PULMONARY NODULE: ICD-10-CM

## 2022-07-29 DIAGNOSIS — N18.31 CHRONIC KIDNEY DISEASE, STAGE 3A: ICD-10-CM

## 2022-07-29 DIAGNOSIS — I69.351 HEMIPLEGIA AND HEMIPARESIS FOLLOWING CEREBRAL INFARCTION AFFECTING RIGHT DOMINANT SIDE: ICD-10-CM

## 2022-07-29 DIAGNOSIS — S01.01XA LACERATION WITHOUT FOREIGN BODY OF SCALP, INITIAL ENCOUNTER: ICD-10-CM

## 2022-07-29 DIAGNOSIS — I12.9 HYPERTENSIVE CHRONIC KIDNEY DISEASE WITH STAGE 1 THROUGH STAGE 4 CHRONIC KIDNEY DISEASE, OR UNSPECIFIED CHRONIC KIDNEY DISEASE: ICD-10-CM

## 2022-07-29 DIAGNOSIS — Z79.01 LONG TERM (CURRENT) USE OF ANTICOAGULANTS: ICD-10-CM
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
## 2022-07-29 DIAGNOSIS — I48.91 UNSPECIFIED ATRIAL FIBRILLATION: ICD-10-CM

## 2022-07-29 DIAGNOSIS — R26.89 OTHER ABNORMALITIES OF GAIT AND MOBILITY: ICD-10-CM

## 2022-07-29 DIAGNOSIS — D64.9 ANEMIA, UNSPECIFIED: ICD-10-CM

## 2022-07-29 DIAGNOSIS — N40.0 BENIGN PROSTATIC HYPERPLASIA WITHOUT LOWER URINARY TRACT SYMPTOMS: ICD-10-CM
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
## 2022-07-29 DIAGNOSIS — G89.29 OTHER CHRONIC PAIN: ICD-10-CM

## 2022-07-29 DIAGNOSIS — M54.40 LUMBAGO WITH SCIATICA, UNSPECIFIED SIDE: ICD-10-CM

## 2022-07-29 DIAGNOSIS — N17.9 ACUTE KIDNEY FAILURE, UNSPECIFIED: ICD-10-CM

## 2022-07-29 DIAGNOSIS — I87.8 OTHER SPECIFIED DISORDERS OF VEINS: ICD-10-CM

## 2022-07-29 DIAGNOSIS — I69.320 APHASIA FOLLOWING CEREBRAL INFARCTION: ICD-10-CM

## 2022-07-29 DIAGNOSIS — E78.5 HYPERLIPIDEMIA, UNSPECIFIED: ICD-10-CM

## 2023-01-01 ENCOUNTER — INPATIENT (INPATIENT)
Facility: HOSPITAL | Age: 73
LOS: 3 days | DRG: 64 | End: 2023-12-05
Attending: STUDENT IN AN ORGANIZED HEALTH CARE EDUCATION/TRAINING PROGRAM | Admitting: STUDENT IN AN ORGANIZED HEALTH CARE EDUCATION/TRAINING PROGRAM
Payer: MEDICARE

## 2023-01-01 VITALS
SYSTOLIC BLOOD PRESSURE: 180 MMHG | RESPIRATION RATE: 18 BRPM | HEART RATE: 100 BPM | OXYGEN SATURATION: 100 % | TEMPERATURE: 98 F | DIASTOLIC BLOOD PRESSURE: 67 MMHG

## 2023-01-01 DIAGNOSIS — Z51.5 ENCOUNTER FOR PALLIATIVE CARE: ICD-10-CM

## 2023-01-01 DIAGNOSIS — Z71.89 OTHER SPECIFIED COUNSELING: ICD-10-CM

## 2023-01-01 DIAGNOSIS — I61.9 NONTRAUMATIC INTRACEREBRAL HEMORRHAGE, UNSPECIFIED: ICD-10-CM

## 2023-01-01 DIAGNOSIS — J96.01 ACUTE RESPIRATORY FAILURE WITH HYPOXIA: ICD-10-CM

## 2023-01-01 DIAGNOSIS — I16.9 HYPERTENSIVE CRISIS, UNSPECIFIED: ICD-10-CM

## 2023-01-01 LAB
A1C WITH ESTIMATED AVERAGE GLUCOSE RESULT: 5.6 % — SIGNIFICANT CHANGE UP (ref 4–5.6)
A1C WITH ESTIMATED AVERAGE GLUCOSE RESULT: 5.6 % — SIGNIFICANT CHANGE UP (ref 4–5.6)
ALBUMIN SERPL ELPH-MCNC: 3 G/DL — LOW (ref 3.5–5.2)
ALBUMIN SERPL ELPH-MCNC: 3 G/DL — LOW (ref 3.5–5.2)
ALBUMIN SERPL ELPH-MCNC: 3.1 G/DL — LOW (ref 3.5–5.2)
ALBUMIN SERPL ELPH-MCNC: 3.1 G/DL — LOW (ref 3.5–5.2)
ALBUMIN SERPL ELPH-MCNC: 3.2 G/DL — LOW (ref 3.5–5.2)
ALBUMIN SERPL ELPH-MCNC: 3.2 G/DL — LOW (ref 3.5–5.2)
ALBUMIN SERPL ELPH-MCNC: 3.7 G/DL — SIGNIFICANT CHANGE UP (ref 3.5–5.2)
ALBUMIN SERPL ELPH-MCNC: 3.7 G/DL — SIGNIFICANT CHANGE UP (ref 3.5–5.2)
ALP SERPL-CCNC: 108 U/L — SIGNIFICANT CHANGE UP (ref 30–115)
ALP SERPL-CCNC: 108 U/L — SIGNIFICANT CHANGE UP (ref 30–115)
ALP SERPL-CCNC: 115 U/L — SIGNIFICANT CHANGE UP (ref 30–115)
ALP SERPL-CCNC: 115 U/L — SIGNIFICANT CHANGE UP (ref 30–115)
ALP SERPL-CCNC: 116 U/L — HIGH (ref 30–115)
ALP SERPL-CCNC: 116 U/L — HIGH (ref 30–115)
ALP SERPL-CCNC: 118 U/L — HIGH (ref 30–115)
ALP SERPL-CCNC: 118 U/L — HIGH (ref 30–115)
ALT FLD-CCNC: 13 U/L — SIGNIFICANT CHANGE UP (ref 0–41)
ALT FLD-CCNC: 13 U/L — SIGNIFICANT CHANGE UP (ref 0–41)
ALT FLD-CCNC: 14 U/L — SIGNIFICANT CHANGE UP (ref 0–41)
ALT FLD-CCNC: 14 U/L — SIGNIFICANT CHANGE UP (ref 0–41)
ALT FLD-CCNC: 17 U/L — SIGNIFICANT CHANGE UP (ref 0–41)
ALT FLD-CCNC: 17 U/L — SIGNIFICANT CHANGE UP (ref 0–41)
ALT FLD-CCNC: 22 U/L — SIGNIFICANT CHANGE UP (ref 0–41)
ALT FLD-CCNC: 22 U/L — SIGNIFICANT CHANGE UP (ref 0–41)
ANION GAP SERPL CALC-SCNC: 10 MMOL/L — SIGNIFICANT CHANGE UP (ref 7–14)
ANION GAP SERPL CALC-SCNC: 11 MMOL/L — SIGNIFICANT CHANGE UP (ref 7–14)
ANION GAP SERPL CALC-SCNC: 15 MMOL/L — HIGH (ref 7–14)
ANION GAP SERPL CALC-SCNC: 15 MMOL/L — HIGH (ref 7–14)
ANION GAP SERPL CALC-SCNC: 8 MMOL/L — SIGNIFICANT CHANGE UP (ref 7–14)
ANION GAP SERPL CALC-SCNC: 8 MMOL/L — SIGNIFICANT CHANGE UP (ref 7–14)
APTT BLD: 25.4 SEC — LOW (ref 27–39.2)
APTT BLD: 25.4 SEC — LOW (ref 27–39.2)
APTT BLD: 26.9 SEC — LOW (ref 27–39.2)
APTT BLD: 26.9 SEC — LOW (ref 27–39.2)
APTT BLD: 29.8 SEC — SIGNIFICANT CHANGE UP (ref 27–39.2)
APTT BLD: 29.8 SEC — SIGNIFICANT CHANGE UP (ref 27–39.2)
APTT BLD: 30.2 SEC — SIGNIFICANT CHANGE UP (ref 27–39.2)
APTT BLD: 30.2 SEC — SIGNIFICANT CHANGE UP (ref 27–39.2)
APTT BLD: 30.5 SEC — SIGNIFICANT CHANGE UP (ref 27–39.2)
APTT BLD: 30.5 SEC — SIGNIFICANT CHANGE UP (ref 27–39.2)
AST SERPL-CCNC: 13 U/L — SIGNIFICANT CHANGE UP (ref 0–41)
AST SERPL-CCNC: 13 U/L — SIGNIFICANT CHANGE UP (ref 0–41)
AST SERPL-CCNC: 15 U/L — SIGNIFICANT CHANGE UP (ref 0–41)
AST SERPL-CCNC: 15 U/L — SIGNIFICANT CHANGE UP (ref 0–41)
AST SERPL-CCNC: 17 U/L — SIGNIFICANT CHANGE UP (ref 0–41)
AST SERPL-CCNC: 17 U/L — SIGNIFICANT CHANGE UP (ref 0–41)
AST SERPL-CCNC: 43 U/L — HIGH (ref 0–41)
AST SERPL-CCNC: 43 U/L — HIGH (ref 0–41)
BASE EXCESS BLDA CALC-SCNC: -6 MMOL/L — LOW (ref -2–3)
BASE EXCESS BLDA CALC-SCNC: -6 MMOL/L — LOW (ref -2–3)
BASE EXCESS BLDV CALC-SCNC: -0.6 MMOL/L — SIGNIFICANT CHANGE UP (ref -2–3)
BASE EXCESS BLDV CALC-SCNC: -0.6 MMOL/L — SIGNIFICANT CHANGE UP (ref -2–3)
BASOPHILS # BLD AUTO: 0.02 K/UL — SIGNIFICANT CHANGE UP (ref 0–0.2)
BASOPHILS # BLD AUTO: 0.04 K/UL — SIGNIFICANT CHANGE UP (ref 0–0.2)
BASOPHILS NFR BLD AUTO: 0.2 % — SIGNIFICANT CHANGE UP (ref 0–1)
BASOPHILS NFR BLD AUTO: 0.6 % — SIGNIFICANT CHANGE UP (ref 0–1)
BASOPHILS NFR BLD AUTO: 0.6 % — SIGNIFICANT CHANGE UP (ref 0–1)
BILIRUB SERPL-MCNC: 0.4 MG/DL — SIGNIFICANT CHANGE UP (ref 0.2–1.2)
BILIRUB SERPL-MCNC: 0.4 MG/DL — SIGNIFICANT CHANGE UP (ref 0.2–1.2)
BILIRUB SERPL-MCNC: 0.7 MG/DL — SIGNIFICANT CHANGE UP (ref 0.2–1.2)
BILIRUB SERPL-MCNC: 0.7 MG/DL — SIGNIFICANT CHANGE UP (ref 0.2–1.2)
BILIRUB SERPL-MCNC: 1 MG/DL — SIGNIFICANT CHANGE UP (ref 0.2–1.2)
BILIRUB SERPL-MCNC: 1 MG/DL — SIGNIFICANT CHANGE UP (ref 0.2–1.2)
BILIRUB SERPL-MCNC: 1.2 MG/DL — SIGNIFICANT CHANGE UP (ref 0.2–1.2)
BILIRUB SERPL-MCNC: 1.2 MG/DL — SIGNIFICANT CHANGE UP (ref 0.2–1.2)
BUN SERPL-MCNC: 29 MG/DL — HIGH (ref 10–20)
BUN SERPL-MCNC: 29 MG/DL — HIGH (ref 10–20)
BUN SERPL-MCNC: 33 MG/DL — HIGH (ref 10–20)
BUN SERPL-MCNC: 34 MG/DL — HIGH (ref 10–20)
BUN SERPL-MCNC: 34 MG/DL — HIGH (ref 10–20)
BUN SERPL-MCNC: 44 MG/DL — HIGH (ref 10–20)
BUN SERPL-MCNC: 44 MG/DL — HIGH (ref 10–20)
BUN SERPL-MCNC: 48 MG/DL — HIGH (ref 10–20)
BUN SERPL-MCNC: 48 MG/DL — HIGH (ref 10–20)
CA-I SERPL-SCNC: 1.15 MMOL/L — SIGNIFICANT CHANGE UP (ref 1.15–1.33)
CA-I SERPL-SCNC: 1.15 MMOL/L — SIGNIFICANT CHANGE UP (ref 1.15–1.33)
CALCIUM SERPL-MCNC: 7.1 MG/DL — LOW (ref 8.4–10.5)
CALCIUM SERPL-MCNC: 7.1 MG/DL — LOW (ref 8.4–10.5)
CALCIUM SERPL-MCNC: 7.7 MG/DL — LOW (ref 8.4–10.4)
CALCIUM SERPL-MCNC: 7.7 MG/DL — LOW (ref 8.4–10.4)
CALCIUM SERPL-MCNC: 8.4 MG/DL — SIGNIFICANT CHANGE UP (ref 8.4–10.5)
CALCIUM SERPL-MCNC: 8.4 MG/DL — SIGNIFICANT CHANGE UP (ref 8.4–10.5)
CALCIUM SERPL-MCNC: 8.5 MG/DL — SIGNIFICANT CHANGE UP (ref 8.4–10.5)
CALCIUM SERPL-MCNC: 8.5 MG/DL — SIGNIFICANT CHANGE UP (ref 8.4–10.5)
CALCIUM SERPL-MCNC: 8.8 MG/DL — SIGNIFICANT CHANGE UP (ref 8.4–10.5)
CALCIUM SERPL-MCNC: 8.8 MG/DL — SIGNIFICANT CHANGE UP (ref 8.4–10.5)
CALCIUM SERPL-MCNC: 8.9 MG/DL — SIGNIFICANT CHANGE UP (ref 8.4–10.5)
CALCIUM SERPL-MCNC: 8.9 MG/DL — SIGNIFICANT CHANGE UP (ref 8.4–10.5)
CHLORIDE SERPL-SCNC: 106 MMOL/L — SIGNIFICANT CHANGE UP (ref 98–110)
CHLORIDE SERPL-SCNC: 120 MMOL/L — HIGH (ref 98–110)
CHLORIDE SERPL-SCNC: 121 MMOL/L — HIGH (ref 98–110)
CHLORIDE SERPL-SCNC: 121 MMOL/L — HIGH (ref 98–110)
CHLORIDE SERPL-SCNC: 122 MMOL/L — HIGH (ref 98–110)
CHLORIDE SERPL-SCNC: 122 MMOL/L — HIGH (ref 98–110)
CHOLEST SERPL-MCNC: 115 MG/DL — SIGNIFICANT CHANGE UP
CHOLEST SERPL-MCNC: 115 MG/DL — SIGNIFICANT CHANGE UP
CO2 SERPL-SCNC: 17 MMOL/L — SIGNIFICANT CHANGE UP (ref 17–32)
CO2 SERPL-SCNC: 17 MMOL/L — SIGNIFICANT CHANGE UP (ref 17–32)
CO2 SERPL-SCNC: 19 MMOL/L — SIGNIFICANT CHANGE UP (ref 17–32)
CO2 SERPL-SCNC: 19 MMOL/L — SIGNIFICANT CHANGE UP (ref 17–32)
CO2 SERPL-SCNC: 21 MMOL/L — SIGNIFICANT CHANGE UP (ref 17–32)
CO2 SERPL-SCNC: 21 MMOL/L — SIGNIFICANT CHANGE UP (ref 17–32)
CO2 SERPL-SCNC: 22 MMOL/L — SIGNIFICANT CHANGE UP (ref 17–32)
CO2 SERPL-SCNC: 24 MMOL/L — SIGNIFICANT CHANGE UP (ref 17–32)
CO2 SERPL-SCNC: 24 MMOL/L — SIGNIFICANT CHANGE UP (ref 17–32)
CREAT SERPL-MCNC: 1.6 MG/DL — HIGH (ref 0.7–1.5)
CREAT SERPL-MCNC: 1.8 MG/DL — HIGH (ref 0.7–1.5)
CREAT SERPL-MCNC: 1.8 MG/DL — HIGH (ref 0.7–1.5)
CREAT SERPL-MCNC: 1.9 MG/DL — HIGH (ref 0.7–1.5)
CREAT SERPL-MCNC: 1.9 MG/DL — HIGH (ref 0.7–1.5)
CREAT SERPL-MCNC: 2.1 MG/DL — HIGH (ref 0.7–1.5)
CREAT SERPL-MCNC: 2.1 MG/DL — HIGH (ref 0.7–1.5)
EGFR: 33 ML/MIN/1.73M2 — LOW
EGFR: 33 ML/MIN/1.73M2 — LOW
EGFR: 37 ML/MIN/1.73M2 — LOW
EGFR: 37 ML/MIN/1.73M2 — LOW
EGFR: 40 ML/MIN/1.73M2 — LOW
EGFR: 40 ML/MIN/1.73M2 — LOW
EGFR: 46 ML/MIN/1.73M2 — LOW
EOSINOPHIL # BLD AUTO: 0 K/UL — SIGNIFICANT CHANGE UP (ref 0–0.7)
EOSINOPHIL # BLD AUTO: 0.01 K/UL — SIGNIFICANT CHANGE UP (ref 0–0.7)
EOSINOPHIL # BLD AUTO: 0.01 K/UL — SIGNIFICANT CHANGE UP (ref 0–0.7)
EOSINOPHIL # BLD AUTO: 0.13 K/UL — SIGNIFICANT CHANGE UP (ref 0–0.7)
EOSINOPHIL # BLD AUTO: 0.13 K/UL — SIGNIFICANT CHANGE UP (ref 0–0.7)
EOSINOPHIL NFR BLD AUTO: 0 % — SIGNIFICANT CHANGE UP (ref 0–8)
EOSINOPHIL NFR BLD AUTO: 0.1 % — SIGNIFICANT CHANGE UP (ref 0–8)
EOSINOPHIL NFR BLD AUTO: 0.1 % — SIGNIFICANT CHANGE UP (ref 0–8)
EOSINOPHIL NFR BLD AUTO: 1.9 % — SIGNIFICANT CHANGE UP (ref 0–8)
EOSINOPHIL NFR BLD AUTO: 1.9 % — SIGNIFICANT CHANGE UP (ref 0–8)
ESTIMATED AVERAGE GLUCOSE: 114 MG/DL — SIGNIFICANT CHANGE UP (ref 68–114)
ESTIMATED AVERAGE GLUCOSE: 114 MG/DL — SIGNIFICANT CHANGE UP (ref 68–114)
ETHANOL SERPL-MCNC: <10 MG/DL — SIGNIFICANT CHANGE UP
ETHANOL SERPL-MCNC: <10 MG/DL — SIGNIFICANT CHANGE UP
GAS PNL BLDA: SIGNIFICANT CHANGE UP
GAS PNL BLDV: 138 MMOL/L — SIGNIFICANT CHANGE UP (ref 136–145)
GAS PNL BLDV: 138 MMOL/L — SIGNIFICANT CHANGE UP (ref 136–145)
GAS PNL BLDV: SIGNIFICANT CHANGE UP
GAS PNL BLDV: SIGNIFICANT CHANGE UP
GLUCOSE BLDC GLUCOMTR-MCNC: 115 MG/DL — HIGH (ref 70–99)
GLUCOSE BLDC GLUCOMTR-MCNC: 115 MG/DL — HIGH (ref 70–99)
GLUCOSE BLDC GLUCOMTR-MCNC: 118 MG/DL — HIGH (ref 70–99)
GLUCOSE BLDC GLUCOMTR-MCNC: 118 MG/DL — HIGH (ref 70–99)
GLUCOSE BLDC GLUCOMTR-MCNC: 131 MG/DL — HIGH (ref 70–99)
GLUCOSE BLDC GLUCOMTR-MCNC: 134 MG/DL — HIGH (ref 70–99)
GLUCOSE BLDC GLUCOMTR-MCNC: 134 MG/DL — HIGH (ref 70–99)
GLUCOSE BLDC GLUCOMTR-MCNC: 139 MG/DL — HIGH (ref 70–99)
GLUCOSE BLDC GLUCOMTR-MCNC: 148 MG/DL — HIGH (ref 70–99)
GLUCOSE BLDC GLUCOMTR-MCNC: 148 MG/DL — HIGH (ref 70–99)
GLUCOSE BLDC GLUCOMTR-MCNC: 179 MG/DL — HIGH (ref 70–99)
GLUCOSE BLDC GLUCOMTR-MCNC: 179 MG/DL — HIGH (ref 70–99)
GLUCOSE BLDC GLUCOMTR-MCNC: 184 MG/DL — HIGH (ref 70–99)
GLUCOSE BLDC GLUCOMTR-MCNC: 184 MG/DL — HIGH (ref 70–99)
GLUCOSE BLDC GLUCOMTR-MCNC: 221 MG/DL — HIGH (ref 70–99)
GLUCOSE BLDC GLUCOMTR-MCNC: 221 MG/DL — HIGH (ref 70–99)
GLUCOSE BLDC GLUCOMTR-MCNC: 223 MG/DL — HIGH (ref 70–99)
GLUCOSE BLDC GLUCOMTR-MCNC: 223 MG/DL — HIGH (ref 70–99)
GLUCOSE BLDC GLUCOMTR-MCNC: 99 MG/DL — SIGNIFICANT CHANGE UP (ref 70–99)
GLUCOSE BLDC GLUCOMTR-MCNC: 99 MG/DL — SIGNIFICANT CHANGE UP (ref 70–99)
GLUCOSE SERPL-MCNC: 132 MG/DL — HIGH (ref 70–99)
GLUCOSE SERPL-MCNC: 132 MG/DL — HIGH (ref 70–99)
GLUCOSE SERPL-MCNC: 136 MG/DL — HIGH (ref 70–99)
GLUCOSE SERPL-MCNC: 136 MG/DL — HIGH (ref 70–99)
GLUCOSE SERPL-MCNC: 140 MG/DL — HIGH (ref 70–99)
GLUCOSE SERPL-MCNC: 140 MG/DL — HIGH (ref 70–99)
GLUCOSE SERPL-MCNC: 141 MG/DL — HIGH (ref 70–99)
GLUCOSE SERPL-MCNC: 141 MG/DL — HIGH (ref 70–99)
GLUCOSE SERPL-MCNC: 142 MG/DL — HIGH (ref 70–99)
GLUCOSE SERPL-MCNC: 142 MG/DL — HIGH (ref 70–99)
GLUCOSE SERPL-MCNC: 234 MG/DL — HIGH (ref 70–99)
GLUCOSE SERPL-MCNC: 234 MG/DL — HIGH (ref 70–99)
HCO3 BLDA-SCNC: 20 MMOL/L — LOW (ref 21–28)
HCO3 BLDA-SCNC: 20 MMOL/L — LOW (ref 21–28)
HCO3 BLDV-SCNC: 25 MMOL/L — SIGNIFICANT CHANGE UP (ref 22–29)
HCO3 BLDV-SCNC: 25 MMOL/L — SIGNIFICANT CHANGE UP (ref 22–29)
HCT VFR BLD CALC: 33.2 % — LOW (ref 42–52)
HCT VFR BLD CALC: 33.2 % — LOW (ref 42–52)
HCT VFR BLD CALC: 33.7 % — LOW (ref 42–52)
HCT VFR BLD CALC: 33.7 % — LOW (ref 42–52)
HCT VFR BLD CALC: 36.6 % — LOW (ref 42–52)
HCT VFR BLD CALC: 36.6 % — LOW (ref 42–52)
HCT VFR BLD CALC: 38.3 % — LOW (ref 42–52)
HCT VFR BLD CALC: 38.3 % — LOW (ref 42–52)
HDLC SERPL-MCNC: 41 MG/DL — SIGNIFICANT CHANGE UP
HDLC SERPL-MCNC: 41 MG/DL — SIGNIFICANT CHANGE UP
HGB BLD-MCNC: 10.6 G/DL — LOW (ref 14–18)
HGB BLD-MCNC: 10.6 G/DL — LOW (ref 14–18)
HGB BLD-MCNC: 10.8 G/DL — LOW (ref 14–18)
HGB BLD-MCNC: 10.8 G/DL — LOW (ref 14–18)
HGB BLD-MCNC: 11.3 G/DL — LOW (ref 14–18)
HGB BLD-MCNC: 11.3 G/DL — LOW (ref 14–18)
HGB BLD-MCNC: 12.8 G/DL — LOW (ref 14–18)
HGB BLD-MCNC: 12.8 G/DL — LOW (ref 14–18)
IMM GRANULOCYTES NFR BLD AUTO: 0.3 % — SIGNIFICANT CHANGE UP (ref 0.1–0.3)
IMM GRANULOCYTES NFR BLD AUTO: 0.3 % — SIGNIFICANT CHANGE UP (ref 0.1–0.3)
IMM GRANULOCYTES NFR BLD AUTO: 0.4 % — HIGH (ref 0.1–0.3)
IMM GRANULOCYTES NFR BLD AUTO: 0.4 % — HIGH (ref 0.1–0.3)
IMM GRANULOCYTES NFR BLD AUTO: 0.6 % — HIGH (ref 0.1–0.3)
IMM GRANULOCYTES NFR BLD AUTO: 0.6 % — HIGH (ref 0.1–0.3)
IMM GRANULOCYTES NFR BLD AUTO: 0.9 % — HIGH (ref 0.1–0.3)
IMM GRANULOCYTES NFR BLD AUTO: 0.9 % — HIGH (ref 0.1–0.3)
INR BLD: 1.16 RATIO — SIGNIFICANT CHANGE UP (ref 0.65–1.3)
INR BLD: 1.16 RATIO — SIGNIFICANT CHANGE UP (ref 0.65–1.3)
INR BLD: 1.18 RATIO — SIGNIFICANT CHANGE UP (ref 0.65–1.3)
INR BLD: 1.18 RATIO — SIGNIFICANT CHANGE UP (ref 0.65–1.3)
INR BLD: 1.36 RATIO — HIGH (ref 0.65–1.3)
INR BLD: 1.36 RATIO — HIGH (ref 0.65–1.3)
INR BLD: 1.55 RATIO — HIGH (ref 0.65–1.3)
INR BLD: 1.55 RATIO — HIGH (ref 0.65–1.3)
INR BLD: 2.81 RATIO — HIGH (ref 0.65–1.3)
INR BLD: 2.81 RATIO — HIGH (ref 0.65–1.3)
LACTATE BLDV-MCNC: 1.6 MMOL/L — SIGNIFICANT CHANGE UP (ref 0.5–2)
LACTATE BLDV-MCNC: 1.6 MMOL/L — SIGNIFICANT CHANGE UP (ref 0.5–2)
LIPID PNL WITH DIRECT LDL SERPL: 53 MG/DL — SIGNIFICANT CHANGE UP
LIPID PNL WITH DIRECT LDL SERPL: 53 MG/DL — SIGNIFICANT CHANGE UP
LYMPHOCYTES # BLD AUTO: 0.43 K/UL — LOW (ref 1.2–3.4)
LYMPHOCYTES # BLD AUTO: 0.43 K/UL — LOW (ref 1.2–3.4)
LYMPHOCYTES # BLD AUTO: 0.48 K/UL — LOW (ref 1.2–3.4)
LYMPHOCYTES # BLD AUTO: 0.48 K/UL — LOW (ref 1.2–3.4)
LYMPHOCYTES # BLD AUTO: 0.71 K/UL — LOW (ref 1.2–3.4)
LYMPHOCYTES # BLD AUTO: 0.71 K/UL — LOW (ref 1.2–3.4)
LYMPHOCYTES # BLD AUTO: 1.26 K/UL — SIGNIFICANT CHANGE UP (ref 1.2–3.4)
LYMPHOCYTES # BLD AUTO: 1.26 K/UL — SIGNIFICANT CHANGE UP (ref 1.2–3.4)
LYMPHOCYTES # BLD AUTO: 17.9 % — LOW (ref 20.5–51.1)
LYMPHOCYTES # BLD AUTO: 17.9 % — LOW (ref 20.5–51.1)
LYMPHOCYTES # BLD AUTO: 3.7 % — LOW (ref 20.5–51.1)
LYMPHOCYTES # BLD AUTO: 3.7 % — LOW (ref 20.5–51.1)
LYMPHOCYTES # BLD AUTO: 4 % — LOW (ref 20.5–51.1)
LYMPHOCYTES # BLD AUTO: 4 % — LOW (ref 20.5–51.1)
LYMPHOCYTES # BLD AUTO: 4.4 % — LOW (ref 20.5–51.1)
LYMPHOCYTES # BLD AUTO: 4.4 % — LOW (ref 20.5–51.1)
MAGNESIUM SERPL-MCNC: 1.7 MG/DL — LOW (ref 1.8–2.4)
MAGNESIUM SERPL-MCNC: 1.7 MG/DL — LOW (ref 1.8–2.4)
MAGNESIUM SERPL-MCNC: 2.3 MG/DL — SIGNIFICANT CHANGE UP (ref 1.8–2.4)
MAGNESIUM SERPL-MCNC: 2.3 MG/DL — SIGNIFICANT CHANGE UP (ref 1.8–2.4)
MAGNESIUM SERPL-MCNC: 2.6 MG/DL — HIGH (ref 1.8–2.4)
MAGNESIUM SERPL-MCNC: 2.6 MG/DL — HIGH (ref 1.8–2.4)
MCHC RBC-ENTMCNC: 27.6 PG — SIGNIFICANT CHANGE UP (ref 27–31)
MCHC RBC-ENTMCNC: 27.6 PG — SIGNIFICANT CHANGE UP (ref 27–31)
MCHC RBC-ENTMCNC: 27.8 PG — SIGNIFICANT CHANGE UP (ref 27–31)
MCHC RBC-ENTMCNC: 27.8 PG — SIGNIFICANT CHANGE UP (ref 27–31)
MCHC RBC-ENTMCNC: 27.9 PG — SIGNIFICANT CHANGE UP (ref 27–31)
MCHC RBC-ENTMCNC: 27.9 PG — SIGNIFICANT CHANGE UP (ref 27–31)
MCHC RBC-ENTMCNC: 28.6 PG — SIGNIFICANT CHANGE UP (ref 27–31)
MCHC RBC-ENTMCNC: 28.6 PG — SIGNIFICANT CHANGE UP (ref 27–31)
MCHC RBC-ENTMCNC: 30.9 G/DL — LOW (ref 32–37)
MCHC RBC-ENTMCNC: 30.9 G/DL — LOW (ref 32–37)
MCHC RBC-ENTMCNC: 31.9 G/DL — LOW (ref 32–37)
MCHC RBC-ENTMCNC: 31.9 G/DL — LOW (ref 32–37)
MCHC RBC-ENTMCNC: 32 G/DL — SIGNIFICANT CHANGE UP (ref 32–37)
MCHC RBC-ENTMCNC: 32 G/DL — SIGNIFICANT CHANGE UP (ref 32–37)
MCHC RBC-ENTMCNC: 33.4 G/DL — SIGNIFICANT CHANGE UP (ref 32–37)
MCHC RBC-ENTMCNC: 33.4 G/DL — SIGNIFICANT CHANGE UP (ref 32–37)
MCV RBC AUTO: 83.6 FL — SIGNIFICANT CHANGE UP (ref 80–94)
MCV RBC AUTO: 83.6 FL — SIGNIFICANT CHANGE UP (ref 80–94)
MCV RBC AUTO: 86.9 FL — SIGNIFICANT CHANGE UP (ref 80–94)
MCV RBC AUTO: 86.9 FL — SIGNIFICANT CHANGE UP (ref 80–94)
MCV RBC AUTO: 89.5 FL — SIGNIFICANT CHANGE UP (ref 80–94)
MONOCYTES # BLD AUTO: 0.41 K/UL — SIGNIFICANT CHANGE UP (ref 0.1–0.6)
MONOCYTES # BLD AUTO: 0.41 K/UL — SIGNIFICANT CHANGE UP (ref 0.1–0.6)
MONOCYTES # BLD AUTO: 0.65 K/UL — HIGH (ref 0.1–0.6)
MONOCYTES # BLD AUTO: 0.65 K/UL — HIGH (ref 0.1–0.6)
MONOCYTES # BLD AUTO: 0.71 K/UL — HIGH (ref 0.1–0.6)
MONOCYTES # BLD AUTO: 0.71 K/UL — HIGH (ref 0.1–0.6)
MONOCYTES # BLD AUTO: 1.06 K/UL — HIGH (ref 0.1–0.6)
MONOCYTES # BLD AUTO: 1.06 K/UL — HIGH (ref 0.1–0.6)
MONOCYTES NFR BLD AUTO: 5.7 % — SIGNIFICANT CHANGE UP (ref 1.7–9.3)
MONOCYTES NFR BLD AUTO: 5.7 % — SIGNIFICANT CHANGE UP (ref 1.7–9.3)
MONOCYTES NFR BLD AUTO: 5.8 % — SIGNIFICANT CHANGE UP (ref 1.7–9.3)
MONOCYTES NFR BLD AUTO: 5.8 % — SIGNIFICANT CHANGE UP (ref 1.7–9.3)
MONOCYTES NFR BLD AUTO: 5.9 % — SIGNIFICANT CHANGE UP (ref 1.7–9.3)
MONOCYTES NFR BLD AUTO: 5.9 % — SIGNIFICANT CHANGE UP (ref 1.7–9.3)
MONOCYTES NFR BLD AUTO: 6.5 % — SIGNIFICANT CHANGE UP (ref 1.7–9.3)
MONOCYTES NFR BLD AUTO: 6.5 % — SIGNIFICANT CHANGE UP (ref 1.7–9.3)
NEUTROPHILS # BLD AUTO: 10.37 K/UL — HIGH (ref 1.4–6.5)
NEUTROPHILS # BLD AUTO: 10.37 K/UL — HIGH (ref 1.4–6.5)
NEUTROPHILS # BLD AUTO: 10.71 K/UL — HIGH (ref 1.4–6.5)
NEUTROPHILS # BLD AUTO: 10.71 K/UL — HIGH (ref 1.4–6.5)
NEUTROPHILS # BLD AUTO: 14.38 K/UL — HIGH (ref 1.4–6.5)
NEUTROPHILS # BLD AUTO: 14.38 K/UL — HIGH (ref 1.4–6.5)
NEUTROPHILS # BLD AUTO: 5.12 K/UL — SIGNIFICANT CHANGE UP (ref 1.4–6.5)
NEUTROPHILS # BLD AUTO: 5.12 K/UL — SIGNIFICANT CHANGE UP (ref 1.4–6.5)
NEUTROPHILS NFR BLD AUTO: 72.9 % — SIGNIFICANT CHANGE UP (ref 42.2–75.2)
NEUTROPHILS NFR BLD AUTO: 72.9 % — SIGNIFICANT CHANGE UP (ref 42.2–75.2)
NEUTROPHILS NFR BLD AUTO: 88.2 % — HIGH (ref 42.2–75.2)
NEUTROPHILS NFR BLD AUTO: 88.2 % — HIGH (ref 42.2–75.2)
NEUTROPHILS NFR BLD AUTO: 89.5 % — HIGH (ref 42.2–75.2)
NEUTROPHILS NFR BLD AUTO: 89.5 % — HIGH (ref 42.2–75.2)
NEUTROPHILS NFR BLD AUTO: 90.1 % — HIGH (ref 42.2–75.2)
NEUTROPHILS NFR BLD AUTO: 90.1 % — HIGH (ref 42.2–75.2)
NON HDL CHOLESTEROL: 74 MG/DL — SIGNIFICANT CHANGE UP
NON HDL CHOLESTEROL: 74 MG/DL — SIGNIFICANT CHANGE UP
NRBC # BLD: 0 /100 WBCS — SIGNIFICANT CHANGE UP (ref 0–0)
OSMOLALITY SERPL: 326 MOS/KG — HIGH (ref 280–301)
OSMOLALITY SERPL: 326 MOS/KG — HIGH (ref 280–301)
PCO2 BLDA: 38 MMHG — SIGNIFICANT CHANGE UP (ref 35–48)
PCO2 BLDA: 38 MMHG — SIGNIFICANT CHANGE UP (ref 35–48)
PCO2 BLDV: 46 MMHG — SIGNIFICANT CHANGE UP (ref 42–55)
PCO2 BLDV: 46 MMHG — SIGNIFICANT CHANGE UP (ref 42–55)
PH BLDA: 7.32 — LOW (ref 7.35–7.45)
PH BLDA: 7.32 — LOW (ref 7.35–7.45)
PH BLDV: 7.35 — SIGNIFICANT CHANGE UP (ref 7.32–7.43)
PH BLDV: 7.35 — SIGNIFICANT CHANGE UP (ref 7.32–7.43)
PHOSPHATE SERPL-MCNC: 1 MG/DL — LOW (ref 2.1–4.9)
PHOSPHATE SERPL-MCNC: 1 MG/DL — LOW (ref 2.1–4.9)
PHOSPHATE SERPL-MCNC: 2.8 MG/DL — SIGNIFICANT CHANGE UP (ref 2.1–4.9)
PHOSPHATE SERPL-MCNC: 2.8 MG/DL — SIGNIFICANT CHANGE UP (ref 2.1–4.9)
PHOSPHATE SERPL-MCNC: 3.2 MG/DL — SIGNIFICANT CHANGE UP (ref 2.1–4.9)
PHOSPHATE SERPL-MCNC: 3.2 MG/DL — SIGNIFICANT CHANGE UP (ref 2.1–4.9)
PLATELET # BLD AUTO: 154 K/UL — SIGNIFICANT CHANGE UP (ref 130–400)
PLATELET # BLD AUTO: 154 K/UL — SIGNIFICANT CHANGE UP (ref 130–400)
PLATELET # BLD AUTO: 166 K/UL — SIGNIFICANT CHANGE UP (ref 130–400)
PLATELET # BLD AUTO: 166 K/UL — SIGNIFICANT CHANGE UP (ref 130–400)
PLATELET # BLD AUTO: 169 K/UL — SIGNIFICANT CHANGE UP (ref 130–400)
PLATELET # BLD AUTO: 169 K/UL — SIGNIFICANT CHANGE UP (ref 130–400)
PLATELET # BLD AUTO: 202 K/UL — SIGNIFICANT CHANGE UP (ref 130–400)
PLATELET # BLD AUTO: 202 K/UL — SIGNIFICANT CHANGE UP (ref 130–400)
PMV BLD: 10.3 FL — SIGNIFICANT CHANGE UP (ref 7.4–10.4)
PMV BLD: 10.3 FL — SIGNIFICANT CHANGE UP (ref 7.4–10.4)
PMV BLD: 9.4 FL — SIGNIFICANT CHANGE UP (ref 7.4–10.4)
PMV BLD: 9.4 FL — SIGNIFICANT CHANGE UP (ref 7.4–10.4)
PMV BLD: 9.9 FL — SIGNIFICANT CHANGE UP (ref 7.4–10.4)
PO2 BLDA: 340 MMHG — HIGH (ref 83–108)
PO2 BLDA: 340 MMHG — HIGH (ref 83–108)
PO2 BLDV: 94 MMHG — SIGNIFICANT CHANGE UP
PO2 BLDV: 94 MMHG — SIGNIFICANT CHANGE UP
POTASSIUM BLDV-SCNC: 4.4 MMOL/L — SIGNIFICANT CHANGE UP (ref 3.5–5.1)
POTASSIUM BLDV-SCNC: 4.4 MMOL/L — SIGNIFICANT CHANGE UP (ref 3.5–5.1)
POTASSIUM SERPL-MCNC: 3.3 MMOL/L — LOW (ref 3.5–5)
POTASSIUM SERPL-MCNC: 3.3 MMOL/L — LOW (ref 3.5–5)
POTASSIUM SERPL-MCNC: 4 MMOL/L — SIGNIFICANT CHANGE UP (ref 3.5–5)
POTASSIUM SERPL-MCNC: 4 MMOL/L — SIGNIFICANT CHANGE UP (ref 3.5–5)
POTASSIUM SERPL-MCNC: 4.2 MMOL/L — SIGNIFICANT CHANGE UP (ref 3.5–5)
POTASSIUM SERPL-MCNC: 5.4 MMOL/L — HIGH (ref 3.5–5)
POTASSIUM SERPL-MCNC: 5.4 MMOL/L — HIGH (ref 3.5–5)
POTASSIUM SERPL-MCNC: 5.6 MMOL/L — HIGH (ref 3.5–5)
POTASSIUM SERPL-MCNC: 5.6 MMOL/L — HIGH (ref 3.5–5)
POTASSIUM SERPL-SCNC: 3.3 MMOL/L — LOW (ref 3.5–5)
POTASSIUM SERPL-SCNC: 3.3 MMOL/L — LOW (ref 3.5–5)
POTASSIUM SERPL-SCNC: 4 MMOL/L — SIGNIFICANT CHANGE UP (ref 3.5–5)
POTASSIUM SERPL-SCNC: 4 MMOL/L — SIGNIFICANT CHANGE UP (ref 3.5–5)
POTASSIUM SERPL-SCNC: 4.2 MMOL/L — SIGNIFICANT CHANGE UP (ref 3.5–5)
POTASSIUM SERPL-SCNC: 5.4 MMOL/L — HIGH (ref 3.5–5)
POTASSIUM SERPL-SCNC: 5.4 MMOL/L — HIGH (ref 3.5–5)
POTASSIUM SERPL-SCNC: 5.6 MMOL/L — HIGH (ref 3.5–5)
POTASSIUM SERPL-SCNC: 5.6 MMOL/L — HIGH (ref 3.5–5)
PROT SERPL-MCNC: 5.6 G/DL — LOW (ref 6–8)
PROT SERPL-MCNC: 5.6 G/DL — LOW (ref 6–8)
PROT SERPL-MCNC: 5.7 G/DL — LOW (ref 6–8)
PROT SERPL-MCNC: 6.5 G/DL — SIGNIFICANT CHANGE UP (ref 6–8)
PROT SERPL-MCNC: 6.5 G/DL — SIGNIFICANT CHANGE UP (ref 6–8)
PROTHROM AB SERPL-ACNC: 13.3 SEC — HIGH (ref 9.95–12.87)
PROTHROM AB SERPL-ACNC: 13.3 SEC — HIGH (ref 9.95–12.87)
PROTHROM AB SERPL-ACNC: 13.5 SEC — HIGH (ref 9.95–12.87)
PROTHROM AB SERPL-ACNC: 13.5 SEC — HIGH (ref 9.95–12.87)
PROTHROM AB SERPL-ACNC: 15.5 SEC — HIGH (ref 9.95–12.87)
PROTHROM AB SERPL-ACNC: 15.5 SEC — HIGH (ref 9.95–12.87)
PROTHROM AB SERPL-ACNC: 17.8 SEC — HIGH (ref 9.95–12.87)
PROTHROM AB SERPL-ACNC: 17.8 SEC — HIGH (ref 9.95–12.87)
PROTHROM AB SERPL-ACNC: 32.4 SEC — HIGH (ref 9.95–12.87)
PROTHROM AB SERPL-ACNC: 32.4 SEC — HIGH (ref 9.95–12.87)
RBC # BLD: 3.71 M/UL — LOW (ref 4.7–6.1)
RBC # BLD: 3.71 M/UL — LOW (ref 4.7–6.1)
RBC # BLD: 3.88 M/UL — LOW (ref 4.7–6.1)
RBC # BLD: 3.88 M/UL — LOW (ref 4.7–6.1)
RBC # BLD: 4.09 M/UL — LOW (ref 4.7–6.1)
RBC # BLD: 4.09 M/UL — LOW (ref 4.7–6.1)
RBC # BLD: 4.58 M/UL — LOW (ref 4.7–6.1)
RBC # BLD: 4.58 M/UL — LOW (ref 4.7–6.1)
RBC # FLD: 14.6 % — HIGH (ref 11.5–14.5)
RBC # FLD: 15.4 % — HIGH (ref 11.5–14.5)
RBC # FLD: 15.4 % — HIGH (ref 11.5–14.5)
RBC # FLD: 15.8 % — HIGH (ref 11.5–14.5)
RBC # FLD: 15.8 % — HIGH (ref 11.5–14.5)
SAO2 % BLDA: 99.7 % — HIGH (ref 94–98)
SAO2 % BLDA: 99.7 % — HIGH (ref 94–98)
SAO2 % BLDV: 99 % — SIGNIFICANT CHANGE UP
SAO2 % BLDV: 99 % — SIGNIFICANT CHANGE UP
SODIUM SERPL-SCNC: 138 MMOL/L — SIGNIFICANT CHANGE UP (ref 135–146)
SODIUM SERPL-SCNC: 138 MMOL/L — SIGNIFICANT CHANGE UP (ref 135–146)
SODIUM SERPL-SCNC: 141 MMOL/L — SIGNIFICANT CHANGE UP (ref 135–146)
SODIUM SERPL-SCNC: 141 MMOL/L — SIGNIFICANT CHANGE UP (ref 135–146)
SODIUM SERPL-SCNC: 150 MMOL/L — HIGH (ref 135–146)
SODIUM SERPL-SCNC: 150 MMOL/L — HIGH (ref 135–146)
SODIUM SERPL-SCNC: 152 MMOL/L — HIGH (ref 135–146)
TRIGL SERPL-MCNC: 108 MG/DL — SIGNIFICANT CHANGE UP
TRIGL SERPL-MCNC: 108 MG/DL — SIGNIFICANT CHANGE UP
TROPONIN T SERPL-MCNC: <0.01 NG/ML — SIGNIFICANT CHANGE UP
TROPONIN T SERPL-MCNC: <0.01 NG/ML — SIGNIFICANT CHANGE UP
TSH SERPL-MCNC: 0.7 UIU/ML — SIGNIFICANT CHANGE UP (ref 0.27–4.2)
TSH SERPL-MCNC: 0.7 UIU/ML — SIGNIFICANT CHANGE UP (ref 0.27–4.2)
WBC # BLD: 11.5 K/UL — HIGH (ref 4.8–10.8)
WBC # BLD: 11.5 K/UL — HIGH (ref 4.8–10.8)
WBC # BLD: 11.97 K/UL — HIGH (ref 4.8–10.8)
WBC # BLD: 11.97 K/UL — HIGH (ref 4.8–10.8)
WBC # BLD: 16.29 K/UL — HIGH (ref 4.8–10.8)
WBC # BLD: 16.29 K/UL — HIGH (ref 4.8–10.8)
WBC # BLD: 7.02 K/UL — SIGNIFICANT CHANGE UP (ref 4.8–10.8)
WBC # BLD: 7.02 K/UL — SIGNIFICANT CHANGE UP (ref 4.8–10.8)
WBC # FLD AUTO: 11.5 K/UL — HIGH (ref 4.8–10.8)
WBC # FLD AUTO: 11.5 K/UL — HIGH (ref 4.8–10.8)
WBC # FLD AUTO: 11.97 K/UL — HIGH (ref 4.8–10.8)
WBC # FLD AUTO: 11.97 K/UL — HIGH (ref 4.8–10.8)
WBC # FLD AUTO: 16.29 K/UL — HIGH (ref 4.8–10.8)
WBC # FLD AUTO: 16.29 K/UL — HIGH (ref 4.8–10.8)
WBC # FLD AUTO: 7.02 K/UL — SIGNIFICANT CHANGE UP (ref 4.8–10.8)
WBC # FLD AUTO: 7.02 K/UL — SIGNIFICANT CHANGE UP (ref 4.8–10.8)

## 2023-01-01 PROCEDURE — 99291 CRITICAL CARE FIRST HOUR: CPT

## 2023-01-01 PROCEDURE — 95711 VEEG 2-12 HR UNMONITORED: CPT

## 2023-01-01 PROCEDURE — 83036 HEMOGLOBIN GLYCOSYLATED A1C: CPT

## 2023-01-01 PROCEDURE — 93010 ELECTROCARDIOGRAM REPORT: CPT

## 2023-01-01 PROCEDURE — 95700 EEG CONT REC W/VID EEG TECH: CPT

## 2023-01-01 PROCEDURE — 80061 LIPID PANEL: CPT

## 2023-01-01 PROCEDURE — 83735 ASSAY OF MAGNESIUM: CPT

## 2023-01-01 PROCEDURE — 71045 X-RAY EXAM CHEST 1 VIEW: CPT | Mod: 26

## 2023-01-01 PROCEDURE — 82803 BLOOD GASES ANY COMBINATION: CPT

## 2023-01-01 PROCEDURE — 80048 BASIC METABOLIC PNL TOTAL CA: CPT

## 2023-01-01 PROCEDURE — 84100 ASSAY OF PHOSPHORUS: CPT

## 2023-01-01 PROCEDURE — 70450 CT HEAD/BRAIN W/O DYE: CPT | Mod: 26

## 2023-01-01 PROCEDURE — 93306 TTE W/DOPPLER COMPLETE: CPT

## 2023-01-01 PROCEDURE — 99291 CRITICAL CARE FIRST HOUR: CPT | Mod: FS,25

## 2023-01-01 PROCEDURE — 82962 GLUCOSE BLOOD TEST: CPT

## 2023-01-01 PROCEDURE — 94003 VENT MGMT INPAT SUBQ DAY: CPT

## 2023-01-01 PROCEDURE — 82330 ASSAY OF CALCIUM: CPT

## 2023-01-01 PROCEDURE — 71045 X-RAY EXAM CHEST 1 VIEW: CPT | Mod: 26,77

## 2023-01-01 PROCEDURE — 31500 INSERT EMERGENCY AIRWAY: CPT

## 2023-01-01 PROCEDURE — 99223 1ST HOSP IP/OBS HIGH 75: CPT

## 2023-01-01 PROCEDURE — 83930 ASSAY OF BLOOD OSMOLALITY: CPT

## 2023-01-01 PROCEDURE — 80053 COMPREHEN METABOLIC PANEL: CPT

## 2023-01-01 PROCEDURE — 71045 X-RAY EXAM CHEST 1 VIEW: CPT

## 2023-01-01 PROCEDURE — 93306 TTE W/DOPPLER COMPLETE: CPT | Mod: 26

## 2023-01-01 PROCEDURE — 85730 THROMBOPLASTIN TIME PARTIAL: CPT

## 2023-01-01 PROCEDURE — 92610 EVALUATE SWALLOWING FUNCTION: CPT | Mod: GN

## 2023-01-01 PROCEDURE — 70450 CT HEAD/BRAIN W/O DYE: CPT | Mod: 26,MA

## 2023-01-01 PROCEDURE — 85014 HEMATOCRIT: CPT

## 2023-01-01 PROCEDURE — 84132 ASSAY OF SERUM POTASSIUM: CPT

## 2023-01-01 PROCEDURE — 36415 COLL VENOUS BLD VENIPUNCTURE: CPT

## 2023-01-01 PROCEDURE — 84443 ASSAY THYROID STIM HORMONE: CPT

## 2023-01-01 PROCEDURE — 85018 HEMOGLOBIN: CPT

## 2023-01-01 PROCEDURE — 70450 CT HEAD/BRAIN W/O DYE: CPT

## 2023-01-01 PROCEDURE — 85610 PROTHROMBIN TIME: CPT

## 2023-01-01 PROCEDURE — 85025 COMPLETE CBC W/AUTO DIFF WBC: CPT

## 2023-01-01 PROCEDURE — 95720 EEG PHY/QHP EA INCR W/VEEG: CPT

## 2023-01-01 PROCEDURE — 83605 ASSAY OF LACTIC ACID: CPT

## 2023-01-01 PROCEDURE — 36556 INSERT NON-TUNNEL CV CATH: CPT

## 2023-01-01 PROCEDURE — 84295 ASSAY OF SERUM SODIUM: CPT

## 2023-01-01 RX ORDER — GABAPENTIN 400 MG/1
2 CAPSULE ORAL
Qty: 0 | Refills: 0 | DISCHARGE

## 2023-01-01 RX ORDER — FAMOTIDINE 10 MG/ML
20 INJECTION INTRAVENOUS DAILY
Refills: 0 | Status: DISCONTINUED | OUTPATIENT
Start: 2023-01-01 | End: 2023-01-01

## 2023-01-01 RX ORDER — SODIUM ZIRCONIUM CYCLOSILICATE 10 G/10G
10 POWDER, FOR SUSPENSION ORAL ONCE
Refills: 0 | Status: COMPLETED | OUTPATIENT
Start: 2023-01-01 | End: 2023-01-01

## 2023-01-01 RX ORDER — HYDROMORPHONE HYDROCHLORIDE 2 MG/ML
1 INJECTION INTRAMUSCULAR; INTRAVENOUS; SUBCUTANEOUS EVERY 4 HOURS
Refills: 0 | Status: DISCONTINUED | OUTPATIENT
Start: 2023-01-01 | End: 2023-01-01

## 2023-01-01 RX ORDER — LABETALOL HCL 100 MG
10 TABLET ORAL
Refills: 0 | Status: DISCONTINUED | OUTPATIENT
Start: 2023-01-01 | End: 2023-01-01

## 2023-01-01 RX ORDER — PROPOFOL 10 MG/ML
10 INJECTION, EMULSION INTRAVENOUS
Qty: 1000 | Refills: 0 | Status: DISCONTINUED | OUTPATIENT
Start: 2023-01-01 | End: 2023-01-01

## 2023-01-01 RX ORDER — PHYTONADIONE (VIT K1) 5 MG
10 TABLET ORAL DAILY
Refills: 0 | Status: COMPLETED | OUTPATIENT
Start: 2023-01-01 | End: 2023-01-01

## 2023-01-01 RX ORDER — SODIUM,POTASSIUM PHOSPHATES 278-250MG
1 POWDER IN PACKET (EA) ORAL EVERY 6 HOURS
Refills: 0 | Status: COMPLETED | OUTPATIENT
Start: 2023-01-01 | End: 2023-01-01

## 2023-01-01 RX ORDER — POLYETHYLENE GLYCOL 3350 17 G/17G
17 POWDER, FOR SOLUTION ORAL EVERY 12 HOURS
Refills: 0 | Status: DISCONTINUED | OUTPATIENT
Start: 2023-01-01 | End: 2023-01-01

## 2023-01-01 RX ORDER — NICARDIPINE HYDROCHLORIDE 30 MG/1
5 CAPSULE, EXTENDED RELEASE ORAL
Qty: 40 | Refills: 0 | Status: DISCONTINUED | OUTPATIENT
Start: 2023-01-01 | End: 2023-01-01

## 2023-01-01 RX ORDER — CHLORHEXIDINE GLUCONATE 213 G/1000ML
1 SOLUTION TOPICAL
Refills: 0 | Status: DISCONTINUED | OUTPATIENT
Start: 2023-01-01 | End: 2023-01-01

## 2023-01-01 RX ORDER — AMLODIPINE BESYLATE 2.5 MG/1
5 TABLET ORAL DAILY
Refills: 0 | Status: DISCONTINUED | OUTPATIENT
Start: 2023-01-01 | End: 2023-01-01

## 2023-01-01 RX ORDER — FUROSEMIDE 40 MG
1 TABLET ORAL
Qty: 0 | Refills: 0 | DISCHARGE

## 2023-01-01 RX ORDER — SENNA PLUS 8.6 MG/1
2 TABLET ORAL AT BEDTIME
Refills: 0 | Status: DISCONTINUED | OUTPATIENT
Start: 2023-01-01 | End: 2023-01-01

## 2023-01-01 RX ORDER — HYDROMORPHONE HYDROCHLORIDE 2 MG/ML
1 INJECTION INTRAMUSCULAR; INTRAVENOUS; SUBCUTANEOUS ONCE
Refills: 0 | Status: DISCONTINUED | OUTPATIENT
Start: 2023-01-01 | End: 2023-01-01

## 2023-01-01 RX ORDER — FUROSEMIDE 40 MG
40 TABLET ORAL DAILY
Refills: 0 | Status: DISCONTINUED | OUTPATIENT
Start: 2023-01-01 | End: 2023-01-01

## 2023-01-01 RX ORDER — DEXMEDETOMIDINE HYDROCHLORIDE IN 0.9% SODIUM CHLORIDE 4 UG/ML
0.5 INJECTION INTRAVENOUS
Qty: 400 | Refills: 0 | Status: DISCONTINUED | OUTPATIENT
Start: 2023-01-01 | End: 2023-01-01

## 2023-01-01 RX ORDER — MANNITOL
97 POWDER (GRAM) MISCELLANEOUS ONCE
Refills: 0 | Status: COMPLETED | OUTPATIENT
Start: 2023-01-01 | End: 2023-01-01

## 2023-01-01 RX ORDER — DEXTROSE 50 % IN WATER 50 %
15 SYRINGE (ML) INTRAVENOUS ONCE
Refills: 0 | Status: DISCONTINUED | OUTPATIENT
Start: 2023-01-01 | End: 2023-01-01

## 2023-01-01 RX ORDER — FUROSEMIDE 40 MG
60 TABLET ORAL DAILY
Refills: 0 | Status: DISCONTINUED | OUTPATIENT
Start: 2023-01-01 | End: 2023-01-01

## 2023-01-01 RX ORDER — GLUCAGON INJECTION, SOLUTION 0.5 MG/.1ML
1 INJECTION, SOLUTION SUBCUTANEOUS ONCE
Refills: 0 | Status: DISCONTINUED | OUTPATIENT
Start: 2023-01-01 | End: 2023-01-01

## 2023-01-01 RX ORDER — ROCURONIUM BROMIDE 10 MG/ML
100 VIAL (ML) INTRAVENOUS ONCE
Refills: 0 | Status: COMPLETED | OUTPATIENT
Start: 2023-01-01 | End: 2023-01-01

## 2023-01-01 RX ORDER — ATORVASTATIN CALCIUM 80 MG/1
1 TABLET, FILM COATED ORAL
Qty: 0 | Refills: 0 | DISCHARGE

## 2023-01-01 RX ORDER — HYDROMORPHONE HYDROCHLORIDE 2 MG/ML
0.5 INJECTION INTRAMUSCULAR; INTRAVENOUS; SUBCUTANEOUS EVERY 4 HOURS
Refills: 0 | Status: DISCONTINUED | OUTPATIENT
Start: 2023-01-01 | End: 2023-01-01

## 2023-01-01 RX ORDER — ETOMIDATE 2 MG/ML
20 INJECTION INTRAVENOUS ONCE
Refills: 0 | Status: COMPLETED | OUTPATIENT
Start: 2023-01-01 | End: 2023-01-01

## 2023-01-01 RX ORDER — HYDRALAZINE HCL 50 MG
10 TABLET ORAL ONCE
Refills: 0 | Status: COMPLETED | OUTPATIENT
Start: 2023-01-01 | End: 2023-01-01

## 2023-01-01 RX ORDER — CHLORHEXIDINE GLUCONATE 213 G/1000ML
15 SOLUTION TOPICAL EVERY 12 HOURS
Refills: 0 | Status: DISCONTINUED | OUTPATIENT
Start: 2023-01-01 | End: 2023-01-01

## 2023-01-01 RX ORDER — DEXMEDETOMIDINE HYDROCHLORIDE IN 0.9% SODIUM CHLORIDE 4 UG/ML
0.49 INJECTION INTRAVENOUS
Qty: 400 | Refills: 0 | Status: DISCONTINUED | OUTPATIENT
Start: 2023-01-01 | End: 2023-01-01

## 2023-01-01 RX ORDER — BACLOFEN 100 %
1 POWDER (GRAM) MISCELLANEOUS
Refills: 0 | DISCHARGE

## 2023-01-01 RX ORDER — POTASSIUM CHLORIDE 20 MEQ
40 PACKET (EA) ORAL
Refills: 0 | Status: COMPLETED | OUTPATIENT
Start: 2023-01-01 | End: 2023-01-01

## 2023-01-01 RX ORDER — PHYTONADIONE (VIT K1) 5 MG
10 TABLET ORAL ONCE
Refills: 0 | Status: COMPLETED | OUTPATIENT
Start: 2023-01-01 | End: 2023-01-01

## 2023-01-01 RX ORDER — SODIUM CHLORIDE 9 MG/ML
1000 INJECTION, SOLUTION INTRAVENOUS
Refills: 0 | Status: DISCONTINUED | OUTPATIENT
Start: 2023-01-01 | End: 2023-01-01

## 2023-01-01 RX ORDER — SODIUM CHLORIDE 5 G/100ML
500 INJECTION, SOLUTION INTRAVENOUS
Refills: 0 | Status: DISCONTINUED | OUTPATIENT
Start: 2023-01-01 | End: 2023-01-01

## 2023-01-01 RX ORDER — MAGNESIUM SULFATE 500 MG/ML
2 VIAL (ML) INJECTION ONCE
Refills: 0 | Status: COMPLETED | OUTPATIENT
Start: 2023-01-01 | End: 2023-01-01

## 2023-01-01 RX ORDER — ACETAMINOPHEN 500 MG
1000 TABLET ORAL ONCE
Refills: 0 | Status: COMPLETED | OUTPATIENT
Start: 2023-01-01 | End: 2023-01-01

## 2023-01-01 RX ORDER — FENTANYL CITRATE 50 UG/ML
50 INJECTION INTRAVENOUS
Refills: 0 | Status: DISCONTINUED | OUTPATIENT
Start: 2023-01-01 | End: 2023-01-01

## 2023-01-01 RX ORDER — MANNITOL
97 POWDER (GRAM) MISCELLANEOUS ONCE
Refills: 0 | Status: DISCONTINUED | OUTPATIENT
Start: 2023-01-01 | End: 2023-01-01

## 2023-01-01 RX ORDER — WARFARIN SODIUM 2.5 MG/1
1 TABLET ORAL
Qty: 0 | Refills: 0 | DISCHARGE

## 2023-01-01 RX ORDER — DEXTROSE 50 % IN WATER 50 %
25 SYRINGE (ML) INTRAVENOUS ONCE
Refills: 0 | Status: DISCONTINUED | OUTPATIENT
Start: 2023-01-01 | End: 2023-01-01

## 2023-01-01 RX ORDER — SODIUM CHLORIDE 9 MG/ML
30 INJECTION INTRAMUSCULAR; INTRAVENOUS; SUBCUTANEOUS ONCE
Refills: 0 | Status: COMPLETED | OUTPATIENT
Start: 2023-01-01 | End: 2023-01-01

## 2023-01-01 RX ORDER — PROTHROMBIN COMPLEX CONCENTRATE (HUMAN) 25.5; 16.5; 24; 22; 22; 26 [IU]/ML; [IU]/ML; [IU]/ML; [IU]/ML; [IU]/ML; [IU]/ML
1500 POWDER, FOR SOLUTION INTRAVENOUS ONCE
Refills: 0 | Status: COMPLETED | OUTPATIENT
Start: 2023-01-01 | End: 2023-01-01

## 2023-01-01 RX ORDER — FENTANYL CITRATE 50 UG/ML
25 INJECTION INTRAVENOUS
Refills: 0 | Status: DISCONTINUED | OUTPATIENT
Start: 2023-01-01 | End: 2023-01-01

## 2023-01-01 RX ORDER — DEXTROSE 50 % IN WATER 50 %
12.5 SYRINGE (ML) INTRAVENOUS ONCE
Refills: 0 | Status: DISCONTINUED | OUTPATIENT
Start: 2023-01-01 | End: 2023-01-01

## 2023-01-01 RX ORDER — POTASSIUM CHLORIDE 20 MEQ
20 PACKET (EA) ORAL
Refills: 0 | Status: DISCONTINUED | OUTPATIENT
Start: 2023-01-01 | End: 2023-01-01

## 2023-01-01 RX ORDER — DIGOXIN 250 MCG
1 TABLET ORAL
Qty: 0 | Refills: 0 | DISCHARGE

## 2023-01-01 RX ORDER — INSULIN LISPRO 100/ML
VIAL (ML) SUBCUTANEOUS EVERY 6 HOURS
Refills: 0 | Status: DISCONTINUED | OUTPATIENT
Start: 2023-01-01 | End: 2023-01-01

## 2023-01-01 RX ORDER — FUROSEMIDE 40 MG
40 TABLET ORAL ONCE
Refills: 0 | Status: COMPLETED | OUTPATIENT
Start: 2023-01-01 | End: 2023-01-01

## 2023-01-01 RX ORDER — AMLODIPINE BESYLATE 2.5 MG/1
1 TABLET ORAL
Qty: 0 | Refills: 0 | DISCHARGE

## 2023-01-01 RX ADMIN — Medication 40 MILLIGRAM(S): at 10:39

## 2023-01-01 RX ADMIN — Medication 2: at 06:37

## 2023-01-01 RX ADMIN — Medication 1 PACKET(S): at 17:36

## 2023-01-01 RX ADMIN — HYDROMORPHONE HYDROCHLORIDE 1 MILLIGRAM(S): 2 INJECTION INTRAMUSCULAR; INTRAVENOUS; SUBCUTANEOUS at 16:25

## 2023-01-01 RX ADMIN — HYDROMORPHONE HYDROCHLORIDE 1 MILLIGRAM(S): 2 INJECTION INTRAMUSCULAR; INTRAVENOUS; SUBCUTANEOUS at 16:51

## 2023-01-01 RX ADMIN — AMLODIPINE BESYLATE 5 MILLIGRAM(S): 2.5 TABLET ORAL at 05:01

## 2023-01-01 RX ADMIN — FENTANYL CITRATE 25 MICROGRAM(S): 50 INJECTION INTRAVENOUS at 19:05

## 2023-01-01 RX ADMIN — Medication 400 MILLIGRAM(S): at 16:51

## 2023-01-01 RX ADMIN — CHLORHEXIDINE GLUCONATE 15 MILLILITER(S): 213 SOLUTION TOPICAL at 05:01

## 2023-01-01 RX ADMIN — Medication 25 GRAM(S): at 06:32

## 2023-01-01 RX ADMIN — FAMOTIDINE 20 MILLIGRAM(S): 10 INJECTION INTRAVENOUS at 12:32

## 2023-01-01 RX ADMIN — Medication 100 MILLIGRAM(S): at 22:19

## 2023-01-01 RX ADMIN — CHLORHEXIDINE GLUCONATE 1 APPLICATION(S): 213 SOLUTION TOPICAL at 06:32

## 2023-01-01 RX ADMIN — DEXMEDETOMIDINE HYDROCHLORIDE IN 0.9% SODIUM CHLORIDE 12.2 MICROGRAM(S)/KG/HR: 4 INJECTION INTRAVENOUS at 17:12

## 2023-01-01 RX ADMIN — Medication 10 MILLIGRAM(S): at 08:50

## 2023-01-01 RX ADMIN — Medication 400 MILLIGRAM(S): at 00:30

## 2023-01-01 RX ADMIN — CHLORHEXIDINE GLUCONATE 15 MILLILITER(S): 213 SOLUTION TOPICAL at 06:32

## 2023-01-01 RX ADMIN — SENNA PLUS 2 TABLET(S): 8.6 TABLET ORAL at 21:17

## 2023-01-01 RX ADMIN — NICARDIPINE HYDROCHLORIDE 25 MG/HR: 30 CAPSULE, EXTENDED RELEASE ORAL at 21:17

## 2023-01-01 RX ADMIN — Medication 400 MILLIGRAM(S): at 08:38

## 2023-01-01 RX ADMIN — Medication 1: at 11:53

## 2023-01-01 RX ADMIN — FENTANYL CITRATE 25 MICROGRAM(S): 50 INJECTION INTRAVENOUS at 13:58

## 2023-01-01 RX ADMIN — CHLORHEXIDINE GLUCONATE 15 MILLILITER(S): 213 SOLUTION TOPICAL at 17:07

## 2023-01-01 RX ADMIN — FENTANYL CITRATE 25 MICROGRAM(S): 50 INJECTION INTRAVENOUS at 08:44

## 2023-01-01 RX ADMIN — HYDROMORPHONE HYDROCHLORIDE 0.5 MILLIGRAM(S): 2 INJECTION INTRAMUSCULAR; INTRAVENOUS; SUBCUTANEOUS at 14:37

## 2023-01-01 RX ADMIN — SODIUM CHLORIDE 30 MILLILITER(S): 9 INJECTION INTRAMUSCULAR; INTRAVENOUS; SUBCUTANEOUS at 22:50

## 2023-01-01 RX ADMIN — DEXMEDETOMIDINE HYDROCHLORIDE IN 0.9% SODIUM CHLORIDE 12.2 MICROGRAM(S)/KG/HR: 4 INJECTION INTRAVENOUS at 09:52

## 2023-01-01 RX ADMIN — POLYETHYLENE GLYCOL 3350 17 GRAM(S): 17 POWDER, FOR SOLUTION ORAL at 17:36

## 2023-01-01 RX ADMIN — ETOMIDATE 20 MILLIGRAM(S): 2 INJECTION INTRAVENOUS at 22:19

## 2023-01-01 RX ADMIN — SODIUM CHLORIDE 30 MILLILITER(S): 5 INJECTION, SOLUTION INTRAVENOUS at 22:10

## 2023-01-01 RX ADMIN — Medication 10 MILLIGRAM(S): at 18:18

## 2023-01-01 RX ADMIN — FENTANYL CITRATE 25 MICROGRAM(S): 50 INJECTION INTRAVENOUS at 09:21

## 2023-01-01 RX ADMIN — Medication 40 MILLIEQUIVALENT(S): at 11:35

## 2023-01-01 RX ADMIN — Medication 102 MILLIGRAM(S): at 15:21

## 2023-01-01 RX ADMIN — SENNA PLUS 2 TABLET(S): 8.6 TABLET ORAL at 21:33

## 2023-01-01 RX ADMIN — POLYETHYLENE GLYCOL 3350 17 GRAM(S): 17 POWDER, FOR SOLUTION ORAL at 17:07

## 2023-01-01 RX ADMIN — FENTANYL CITRATE 25 MICROGRAM(S): 50 INJECTION INTRAVENOUS at 09:32

## 2023-01-01 RX ADMIN — Medication 1000 MILLIGRAM(S): at 16:51

## 2023-01-01 RX ADMIN — Medication 40 MILLIGRAM(S): at 05:58

## 2023-01-01 RX ADMIN — Medication 40 MILLIEQUIVALENT(S): at 08:02

## 2023-01-01 RX ADMIN — Medication 10 MILLIGRAM(S): at 22:31

## 2023-01-01 RX ADMIN — CHLORHEXIDINE GLUCONATE 1 APPLICATION(S): 213 SOLUTION TOPICAL at 05:01

## 2023-01-01 RX ADMIN — Medication 10 MILLIGRAM(S): at 08:07

## 2023-01-01 RX ADMIN — PROTHROMBIN COMPLEX CONCENTRATE (HUMAN) 400 INTERNATIONAL UNIT(S): 25.5; 16.5; 24; 22; 22; 26 POWDER, FOR SOLUTION INTRAVENOUS at 22:51

## 2023-01-01 RX ADMIN — POLYETHYLENE GLYCOL 3350 17 GRAM(S): 17 POWDER, FOR SOLUTION ORAL at 05:01

## 2023-01-01 RX ADMIN — SODIUM ZIRCONIUM CYCLOSILICATE 10 GRAM(S): 10 POWDER, FOR SUSPENSION ORAL at 04:49

## 2023-01-01 RX ADMIN — POLYETHYLENE GLYCOL 3350 17 GRAM(S): 17 POWDER, FOR SOLUTION ORAL at 05:25

## 2023-01-01 RX ADMIN — SODIUM CHLORIDE 30 MILLILITER(S): 5 INJECTION, SOLUTION INTRAVENOUS at 07:00

## 2023-01-01 RX ADMIN — Medication 102 MILLIGRAM(S): at 22:49

## 2023-01-01 RX ADMIN — DEXMEDETOMIDINE HYDROCHLORIDE IN 0.9% SODIUM CHLORIDE 12.2 MICROGRAM(S)/KG/HR: 4 INJECTION INTRAVENOUS at 22:10

## 2023-01-01 RX ADMIN — Medication 10 MILLIGRAM(S): at 16:57

## 2023-01-01 RX ADMIN — CHLORHEXIDINE GLUCONATE 15 MILLILITER(S): 213 SOLUTION TOPICAL at 05:24

## 2023-01-01 RX ADMIN — NICARDIPINE HYDROCHLORIDE 25 MG/HR: 30 CAPSULE, EXTENDED RELEASE ORAL at 22:49

## 2023-01-01 RX ADMIN — Medication 10 MILLIGRAM(S): at 10:39

## 2023-01-01 RX ADMIN — Medication 10 MILLIGRAM(S): at 00:01

## 2023-01-01 RX ADMIN — FAMOTIDINE 20 MILLIGRAM(S): 10 INJECTION INTRAVENOUS at 09:53

## 2023-01-01 RX ADMIN — FENTANYL CITRATE 25 MICROGRAM(S): 50 INJECTION INTRAVENOUS at 07:19

## 2023-01-01 RX ADMIN — DEXMEDETOMIDINE HYDROCHLORIDE IN 0.9% SODIUM CHLORIDE 12.2 MICROGRAM(S)/KG/HR: 4 INJECTION INTRAVENOUS at 21:17

## 2023-01-01 RX ADMIN — Medication 1000 MILLIGRAM(S): at 00:45

## 2023-01-01 RX ADMIN — Medication 1000 MILLIGRAM(S): at 08:44

## 2023-01-01 RX ADMIN — Medication 1 PACKET(S): at 05:25

## 2023-01-01 RX ADMIN — NICARDIPINE HYDROCHLORIDE 25 MG/HR: 30 CAPSULE, EXTENDED RELEASE ORAL at 09:55

## 2023-01-01 RX ADMIN — Medication 10 MILLIGRAM(S): at 13:32

## 2023-01-01 RX ADMIN — FENTANYL CITRATE 50 MICROGRAM(S): 50 INJECTION INTRAVENOUS at 00:05

## 2023-01-01 RX ADMIN — FENTANYL CITRATE 50 MICROGRAM(S): 50 INJECTION INTRAVENOUS at 00:20

## 2023-01-01 RX ADMIN — Medication 1 PACKET(S): at 23:43

## 2023-01-01 RX ADMIN — CHLORHEXIDINE GLUCONATE 1 APPLICATION(S): 213 SOLUTION TOPICAL at 05:14

## 2023-01-01 RX ADMIN — Medication 40 MILLIGRAM(S): at 12:03

## 2023-01-01 RX ADMIN — Medication 10 MILLIGRAM(S): at 02:00

## 2023-01-01 RX ADMIN — PROPOFOL 5.85 MICROGRAM(S)/KG/MIN: 10 INJECTION, EMULSION INTRAVENOUS at 23:09

## 2023-01-01 RX ADMIN — AMLODIPINE BESYLATE 5 MILLIGRAM(S): 2.5 TABLET ORAL at 09:54

## 2023-01-01 RX ADMIN — Medication 102 MILLIGRAM(S): at 11:36

## 2023-01-01 RX ADMIN — Medication 10 MILLIGRAM(S): at 11:42

## 2023-01-01 RX ADMIN — Medication 10 MILLIGRAM(S): at 21:37

## 2023-01-01 RX ADMIN — HYDROMORPHONE HYDROCHLORIDE 0.5 MILLIGRAM(S): 2 INJECTION INTRAMUSCULAR; INTRAVENOUS; SUBCUTANEOUS at 14:50

## 2023-01-01 RX ADMIN — FENTANYL CITRATE 25 MICROGRAM(S): 50 INJECTION INTRAVENOUS at 18:57

## 2023-01-01 RX ADMIN — CHLORHEXIDINE GLUCONATE 1 APPLICATION(S): 213 SOLUTION TOPICAL at 05:24

## 2023-01-01 RX ADMIN — CHLORHEXIDINE GLUCONATE 15 MILLILITER(S): 213 SOLUTION TOPICAL at 17:12

## 2023-01-01 RX ADMIN — CHLORHEXIDINE GLUCONATE 15 MILLILITER(S): 213 SOLUTION TOPICAL at 17:36

## 2023-01-01 RX ADMIN — FAMOTIDINE 20 MILLIGRAM(S): 10 INJECTION INTRAVENOUS at 12:03

## 2023-01-01 RX ADMIN — Medication 970 GRAM(S): at 22:51

## 2023-01-01 RX ADMIN — Medication 102 MILLIGRAM(S): at 11:56

## 2023-01-01 RX ADMIN — CHLORHEXIDINE GLUCONATE 15 MILLILITER(S): 213 SOLUTION TOPICAL at 05:14

## 2023-01-01 RX ADMIN — FENTANYL CITRATE 25 MICROGRAM(S): 50 INJECTION INTRAVENOUS at 13:53

## 2023-01-01 RX ADMIN — Medication 1 PACKET(S): at 12:03

## 2023-05-12 NOTE — PATIENT PROFILE ADULT - PATIENT REPRESENTATIVE: ( YOU CAN CHOOSE ANY PERSON THAT CAN ASSIST YOU WITH YOUR HEALTH CARE PREFERENCES, DOES NOT HAVE TO BE A SPOUSE, IMMEDIATE FAMILY OR SIGNIFICANT OTHER/PARTNER)
Encounter addended by: Maritza Villegas RN on: 5/12/2023 11:12 AM   Actions taken: Charge Capture section accepted, Clinical Note Signed yes

## 2023-09-16 NOTE — PROGRESS NOTE ADULT - REASON FOR ADMISSION
[facility] - [place], [address]                                OPERATIVE REPORT    PATIENT NAME: Dallas Norman                    :        1991  MED REC NO:   [id number]                          ROOM:  ACCOUNT NO:   [de-identified]                           ADMIT DATE: 09/15/2023  PROVIDER:     Giovanny Good MD    DATE OF PROCEDURE:  09/15/2023    PREOPERATIVE DIAGNOSIS:  Lipoma versus cyst, upper back. POSTOPERATIVE DIAGNOSIS:  Deep lipoma of upper back. OPERATION PERFORMED:  Lipoma excision, upper back. SURGEON:  Giovanny Good MD    ANESTHESIA:  Local.    ESTIMATED BLOOD LOSS:  Less than 50 mL. SPECIMEN:  Lipoma. COUNT:  Sponge and needle count correct. INDICATIONS:  The patient is a 53-year-old male with broad subcutaneous  mass on the upper back and in junction with the neck and shoulder, that  is consistent with either a large sebaceous cyst or a lipoma. He  presents for elective excision of the lesion. FINDINGS:  Deep, subfascial lipoma, approximately 6 x 7 cm, fully  removed. DESCRIPTION OF PROCEDURE:  After informed consent was obtained, the  patient was taken to the operating room, and placed in the prone  position with appropriate padding to all pressure points. The  previously marked mass on the right upper back near the junction with  the shoulder was then prepped and draped in a sterile fashion. An  elliptical incision measuring approximately 1.5 x 4 cm was outlined over  the mass and then diffusely infiltrated with local anesthesia. The skin  was then incised and we carried the dissection down through the dermis  into the subcutaneous fat. Initially, the actual lesion was not viable,  so I had to continue dissecting deeper until I eventually went through  superficial fascial layer and now deep to this fascia we could see  abnormal fat popping up consistent with lipoma.   I continued releasing  the fascia overlying the lipoma until I could fully expose the lipoma. It was then very easy to mobilize the lipoma out of its deep pocket and  release and remove it. It measured approximately 6 x 7 cm in dimension. The pocket was checked for hemostasis, which was excellent. I then  reapproximated the fascial layers with interrupted 3-0 Vicryl sutures. The skin incision was closed with 3-0 Vicryl subcutaneous sutures  followed by a 3-0 nylon horizontal mattress sutures. A sterile dressing  was applied. The patient was then taken to recovery room in stable  condition.         Daneila Brito MD    D: 09/15/2023 13:38:24       T: 09/15/2023 15:59:07     [id number]  Job#: [id number]     Doc#: [id number]    CC:  [id number]

## 2023-12-01 NOTE — ED PROVIDER NOTE - OBJECTIVE STATEMENT
74 yo M pmhx afib on coumadin, htn, hld, cva 15 years ago with residual R sided weakness BIBEMS unresponsive. as per sister after dinner she heard pt collapse and was found down, at that time EMS was called. upon arrival to ED pt unresponsive.

## 2023-12-01 NOTE — ED PROVIDER NOTE - PROGRESS NOTE DETAILS
As per sister bedside, patient was going to the bathroom when they heard a thud at 8:30pm. Found him to be unresponsive which is not his baseline. Stroke code activated on arrival. Patient unresponsive on exam, intubated for airway protection. Medication list evaluated, patient is on coumadin. Discussed with Dr. Banks from neurology who recommending imaging and INR prior to TNK decision. SS Patient arrived from [place]. VS stable, sbp 125 on propofol gtt. Neurosurgery and Neuro ICU - at bedside. Sister at bedside. SS Messaged PCP Dr Feliz about patient admission. Plan for admission to nsg. Patient made DNR after discussion with neuro team and sister at bedside. No acute intervention by NSG at this time.

## 2023-12-01 NOTE — ED ADULT TRIAGE NOTE - CHIEF COMPLAINT QUOTE
BIBA found on the floor of his home unresponsive. suspected stroke. FS complete 140 BIBA found on the floor of his home unresponsive. suspected stroke. FS complete 140. HX of stroke 17 years ago with RT side defecate.

## 2023-12-01 NOTE — ED PROVIDER NOTE - CARE PLAN
Principal Discharge DX:	Intraparenchymal hemorrhage of brain  Secondary Diagnosis:	Fall   1 Principal Discharge DX:	Intraparenchymal hemorrhage of brain

## 2023-12-01 NOTE — ED PROVIDER NOTE - CRITICAL CARE ATTENDING CONTRIBUTION TO CARE
I have personally seen and examined this patient.  I have fully participated in the care of this patient. I have reviewed all pertinent clinical information, including history, physical exam, plan and the PA's note and agree except as noted    73-year-old male past med history of hypertension, hyperlipidemia, CVA 15 years ago with residual right-sided deficits, A-fib on Coumadin presents after being found unresponsive.  Patient lives with sister who states that he was at his baseline.  Had dinner with him states that he went to use the restroom and then she heard a thud.  Was immediately at his side and found him to be unresponsive.  EMS was called.  On arrival patient remained unresponsive stroke code activated.  Patient intubated for airway protection.    CONSTITUTIONAL: Well-developed; well-nourished; in no acute distress.   SKIN: warm, dry  HEAD: Normocephalic; atraumatic.  EYES: PERRL, no conjunctival erythema, 2mm pupils bilaterally.   ENT: No nasal discharge; airway clear.  NECK: Supple; non tender.  CARD: S1, S2 normal;  Regular rate and rhythm.   RESP: decreased respiratory effort with increased secretions and noisy breathing.   ABD: soft non tender, non distended  EXT: No movement   LYMPH: No acute cervical adenopathy.  NEURO: unresponsive

## 2023-12-01 NOTE — ED PROVIDER NOTE - CLINICAL SUMMARY MEDICAL DECISION MAKING FREE TEXT BOX
Patient transferred from [facility] for large large volume ICH.  Intubated upon arrival.  Maintained on propofol, Cardene.  Status post reversal of Coumadin with vitamin K and Kcentra.  Blood pressure appropriate at this time.  Status post neurocritical care and neurosurgery evaluation at bedside.  Will admit for further evaluation.

## 2023-12-01 NOTE — ED PROVIDER NOTE - PHYSICAL EXAMINATION
GENERAL: unresponsive  HEAD: No visible or palpable bumps or hematomas. No ecchymosis behind ears B/L.  Eyes: PERRLA, L pupil deviated medially  CVS: Normal S1,S2. No murmurs appreciated on auscultation   RESP: decreased respiratory effort with noisy breathing and secretions  GI: Normal auscultation of bowel sounds in all 4 quadrants. Soft, Nontender, Nondistended. No guarding or rebound tenderness.   Skin: Warm, Dry. No rashes or lesions. Good cap refill < 2 sec B/L.  EXT: Radial and pedal pulses present B/L. (+)b/l LE edema  Neuro: unresponsive. GCS 3

## 2023-12-01 NOTE — ED ADULT NURSE NOTE - CHIEF COMPLAINT QUOTE
BIBA found on the floor of his home unresponsive. suspected stroke. FS complete 140. HX of stroke 17 years ago with RT side defecate.

## 2023-12-02 NOTE — PROGRESS NOTE ADULT - SUBJECTIVE AND OBJECTIVE BOX
MARCELO BASSETT  73y  Male  HPI:  73M w/ PMHx of Afib on Coumadin, HTN, HLD, CVA 17yrs ago w/ residual R sided weakness (ambulates w/ cane at baseline, requires assistance w/ ADLs, lives with his sister who is his primary caretaker) BIBEMS to [facility] ED after pt's sister found him unresponsive. Pt's sister states they were eating dinner, pt at around 8pm pt went to use bathroom. Sister heard thud from bathroom, attempted to open door but pt had fallen in front of door and she was unable to reach him. Sister called EMS, who were able to open door and found pt unresponsive on the floor with sonorous respirations. GCS 3 on arrival, pt noted to have equal/reactive pupils. Intubated for airway protection. Had CTH showing large R basal ganglia IPH w/ SOHAM, surrounding vasogenic edema, and 1.3cm leftward MLS. SBP 160s, pt started on Nicardipine gtt. INR 2.81, pt given Kcentra and Vitamin K for reversal of Coumadin. Hyperosmolar therapy administered Pt transferred to [facility].      Admission scores:  GCS 3  ICH 4  nihss 23 (02 Dec 2023 01:48)    MEDICATIONS  (STANDING):  amLODIPine   Tablet 5 milliGRAM(s) Oral daily  chlorhexidine 0.12% Liquid 15 milliLiter(s) Oral Mucosa every 12 hours  chlorhexidine 2% Cloths 1 Application(s) Topical <User Schedule>  dexMEDEtomidine Infusion 0.5 MICROgram(s)/kG/Hr (12.2 mL/Hr) IV Continuous <Continuous>  dextrose 5%. 1000 milliLiter(s) (100 mL/Hr) IV Continuous <Continuous>  dextrose 5%. 1000 milliLiter(s) (50 mL/Hr) IV Continuous <Continuous>  dextrose 50% Injectable 25 Gram(s) IV Push once  dextrose 50% Injectable 25 Gram(s) IV Push once  dextrose 50% Injectable 12.5 Gram(s) IV Push once  famotidine    Tablet 20 milliGRAM(s) Oral daily  glucagon  Injectable 1 milliGRAM(s) IntraMuscular once  insulin lispro (ADMELOG) corrective regimen sliding scale   SubCutaneous every 6 hours  niCARdipine Infusion 5 mG/Hr (25 mL/Hr) IV Continuous <Continuous>  phytonadione  IVPB 10 milliGRAM(s) IV Intermittent daily  potassium chloride   Powder 40 milliEquivalent(s) Oral every 2 hours  sodium chloride 3%. 500 milliLiter(s) (30 mL/Hr) IV Continuous <Continuous>    MEDICATIONS  (PRN):  dextrose Oral Gel 15 Gram(s) Oral once PRN Blood Glucose LESS THAN 70 milliGRAM(s)/deciliter  fentaNYL    Injectable 25 MICROGram(s) IV Push every 2 hours PRN CPOT 4-5  fentaNYL    Injectable 50 MICROGram(s) IV Push every 2 hours PRN CPOT 6-8  labetalol Injectable 10 milliGRAM(s) IV Push every 2 hours PRN Systolic blood pressure > 140    INTERVAL EVENTS: Patient seen today, chart reviewed.    T(C): 36.1 (12-02-23 @ 08:00), Max: 37.2 (12-02-23 @ 04:00)  HR: 82 (12-02-23 @ 11:00) (82 - 118)  BP: 140/66 (12-02-23 @ 11:00) (115/55 - 220/103)  RR: 23 (12-02-23 @ 11:00) (18 - 27)  SpO2: 100% (12-02-23 @ 11:00) (98% - 100%)  Wt(kg): --Vital Signs Last 24 Hrs  T(C): 36.1 (02 Dec 2023 08:00), Max: 37.2 (02 Dec 2023 04:00)  T(F): 97 (02 Dec 2023 08:00), Max: 98.9 (02 Dec 2023 04:00)  HR: 82 (02 Dec 2023 11:00) (82 - 118)  BP: 140/66 (02 Dec 2023 11:00) (115/55 - 220/103)  BP(mean): 95 (02 Dec 2023 11:00) (78 - 104)  RR: 23 (02 Dec 2023 11:00) (18 - 27)  SpO2: 100% (02 Dec 2023 11:00) (98% - 100%)    Parameters below as of 02 Dec 2023 08:00  Patient On (Oxygen Delivery Method): ventilator    O2 Concentration (%): 50    PHYSICAL EXAM:  GENERAL: Patient vented, unresponsive, bruising to the right forehead  NECK: Supple, No JVD,  CHEST/LUNG: Transmittable sounds  HEART: S1, S2, irregular   ABDOMEN: Soft, Nontender, Nondistended; Bowel sounds present  EXTREMITIES: Chronic edema, R > L      LABS:                          10.6   16.29 )-----------( 169      ( 02 Dec 2023 04:00 )             33.2             12-02    138  |  106  |  29<H>  ----------------------------<  234<H>  3.3<L>   |  17  |  1.6<H>    Ca    7.1<L>      02 Dec 2023 04:00  Phos  2.8     12-02  Mg     1.7     12-02    TPro  5.7<L>  /  Alb  3.2<L>  /  TBili  1.0  /  DBili  x   /  AST  17  /  ALT  17  /  AlkPhos  116<H>  12-02    LIVER FUNCTIONS - ( 02 Dec 2023 04:00 )  Alb: 3.2 g/dL / Pro: 5.7 g/dL / ALK PHOS: 116 U/L / ALT: 17 U/L / AST: 17 U/L / GGT: x                   PT/INR - ( 02 Dec 2023 09:55 )   PT: 15.50 sec;   INR: 1.36 ratio         PTT - ( 02 Dec 2023 09:55 )  PTT:30.5 sec  CARDIAC MARKERS ( 01 Dec 2023 21:40 )  x     / <0.01 ng/mL / x     / x     / x          ABG - ( 02 Dec 2023 09:54 )  pH, Arterial: 7.37  pH, Blood: x     /  pCO2: 41    /  pO2: 169   / HCO3: 24    / Base Excess: -1.5  /  SaO2: 99.6          Urinalysis Basic - ( 02 Dec 2023 04:00 )    Color: x / Appearance: x / SG: x / pH: x  Gluc: 234 mg/dL / Ketone: x  / Bili: x / Urobili: x   Blood: x / Protein: x / Nitrite: x   Leuk Esterase: x / RBC: x / WBC x   Sq Epi: x / Non Sq Epi: x / Bacteria: x        < from: 12 Lead ECG (12.01.23 @ 22:05) >    Ventricular Rate 70 BPM    Atrial Rate 340 BPM    QRS Duration 120 ms    Q-T Interval 382 ms    QTC Calculation(Bazett) 412 ms    R Axis 58 degrees    T Axis 102 degrees    Diagnosis Line    Atrial fibrillation  Left ventricular hypertrophy with QRS widening  ST & T wave abnormality, consider lateral ischemia or digitalis effect  Abnormal ECG    < end of copied text >      RADIOLOGY & ADDITIONAL TESTS:  < from: Xray Chest 1 View- PORTABLE-Urgent (Xray Chest 1 View- PORTABLE-Urgent .) (12.02.23 @ 06:57) >    PROCEDURE DATE:  12/02/2023          INTERPRETATION:  Clinical History / Reason for exam: Follow-up.    Comparison : Chest radiograph December 1, 2023.    Technique/Positioning: Low lung volume.    Findings:    Support devices: ETT with its tip above the anshul and NGT with its tip   below the diaphragm.    Cardiac/mediastinum/hilum: Stable    Lung parenchyma/Pleura: Within normal limits.    Skeleton/soft tissues: Stable    Impression:    Low lung volume.    No acute infiltrates.    Support tubes and lines as above.    --- End of Report ---    < end of copied text >  < from: CT Head No Cont (12.02.23 @ 05:04) >    INTERPRETATION:  CLINICAL HISTORY: Intracranial hemorrhage, follow-up.    COMPARISON: CT head 12/1/2023.    TECHNIQUE: Contiguous unenhanced CT axial images of the head with coronal   and sagittal reformats without the administration of IV contrast.    FINDINGS:    There is increased size of large right basal ganglia intraparenchymal   hemorrhage measuring approximately 10.6 x 7.9 cm, previously 9.3 x 6.6 cm   (measured on 2/20). There is increased intraventricular hemorrhage   predominantly layering within the posterior horn lateral ventricles,   temporal horns, third, and fourth ventricles. There is worsening   subfalcine herniation, central herniation as well as uncal herniation.    There is new intraparenchymal hemorrhage in the left frontal and parietal   lobes.    There is prominence of the sulci, sylvian fissures, and ventricles likely   reflecting mild diffuse parenchymal volume loss.    There are scattered patchy low attenuations in bilateral periventricular   cerebral white matter consistent with severe chronic microvascular   ischemic changes. Stable left frontoparietal and occipital gliosis and   encephalomalacia from chronic infarct.    No acute osseous abnormality or depressed skull fracture. Impacted right   molar tooth. The mastoid air cells are aerated bilaterally. Mild ethmoid   and maxillary sinus mucosal thickening. The visualized orbits are   unremarkable.    IMPRESSION:  Since the recent prior head CT there is increased large acute hemorrhage   centered within the right basal ganglia with intraventricular extension   as well as new hemorrhage within the left frontal and parietal lobes.    Worsening trapping of the left lateral ventricle as well as subfalcine,   central as well as uncal herniation.    These findings were discussed with KENNEY ALVA [id number] at 12/2/2023   5:47 AM by Dr. Ley with read back confirmation.    --- End of Report ---    < end of copied text >  < from: CT Brain Stroke Protocol (12.01.23 @ 21:51) >    INTERPRETATION:  CLINICAL HISTORY: Code stroke.    COMPARISON: CT head 7/17/2022.    TECHNIQUE: Contiguous unenhanced CT axial images of the head with coronal   and sagittal reformats without the administration of IV contrast.    FINDINGS:    Large right basal ganglia intraparenchymal hemorrhage with surrounding   vasogenic edema and extension into the posterior horn lateral ventricles,   the aqueduct of Sylvius and the 4th ventricle. There is 1.3 cm leftward   midline shift.    There is prominence of the sulci, sylvian fissures, and ventricles likely   reflecting mild diffuse parenchymal volume loss. Redemonstration of   extensive left frontal, parietal, and occipital gliosis and   encephalomalacia compatible with chronic infarct.    There are scattered patchy low attenuations in bilateral periventricular   cerebral white matter consistent with moderate-severe chronic   microvascular ischemic changes.    There is no diffuse ventriculomegaly.    No acute osseous abnormality or depressed skull fracture. Impacted right   molar tooth. The mastoid air cells are aerated bilaterally. Moderate   ethmoid mucosal thickening. Mildfrontal and maxillary mucosal   thickening. The visualized orbits are unremarkable.    IMPRESSION:    1.  Large right basal ganglia intraparenchymal hemorrhage with extension   into the posterior horn lateral ventricles, the aqueduct of Sylvius and   the 4th ventricle.  There is surrounding vasogenic edema. Etiology favors   a hypertensive hemorrhage.  2.  Leftward midline shift measures 1.3 cm.  3.  Moderate-severe chronic microvascular ischemic changes and chronic   infarct as above.    Preliminary findings were discussed with KENNEY Rodriguez at 12/1/2023 9:51 PM.    --- End of Report ---          < end of copied text >

## 2023-12-02 NOTE — SWALLOW BEDSIDE ASSESSMENT ADULT - SLP PERTINENT HISTORY OF CURRENT PROBLEM
73M w/ PMHx of Afib on Coumadin, HTN, HLD, CVA 17yrs ago w/ residual R sided weakness (ambulates w/ cane at baseline, requires assistance w/ ADLs, lives with his sister who is his primary caretaker) BIBEMS to [facility] ED after pt's sister found him unresponsive. Pt's sister states they were eating dinner, pt at around 8pm pt went to use bathroom. Sister heard thud from bathroom, attempted to open door but pt had fallen in front of door and she was unable to reach him. Sister called EMS, who were able to open door and found pt unresponsive on the floor with sonorous respirations. GCS 3 on arrival, pt noted to have equal/reactive pupils. Intubated for airway protection. Had CTH showing large R basal ganglia IPH w/ SOHAM, surrounding vasogenic edema, and 1.3cm leftward MLS.

## 2023-12-02 NOTE — PROGRESS NOTE ADULT - SUBJECTIVE AND OBJECTIVE BOX
HD# 2       S/P Rt BG hemorrhage with Intraventricular extension     pt seen and examined at bedside pt is intubated sedated on propofol       Vital Signs Last 24 Hrs  T(C): 36.1 (02 Dec 2023 08:00), Max: 37.2 (02 Dec 2023 04:00)  T(F): 97 (02 Dec 2023 08:00), Max: 98.9 (02 Dec 2023 04:00)  HR: 88 (02 Dec 2023 09:00) (88 - 118)  BP: 139/65 (02 Dec 2023 09:00) (115/55 - 220/103)  BP(mean): 94 (02 Dec 2023 09:00) (78 - 94)  RR: 25 (02 Dec 2023 09:00) (18 - 27)  SpO2: 100% (02 Dec 2023 09:00) (98% - 100%)    Parameters below as of 02 Dec 2023 08:00  Patient On (Oxygen Delivery Method): ventilator    O2 Concentration (%): 50    I&O's Detail    01 Dec 2023 07:01  -  02 Dec 2023 07:00  --------------------------------------------------------  IN:    Propofol: 29.8 mL    sodium chloride 3%: 60 mL  Total IN: 89.8 mL    OUT:    Voided (mL): 400 mL  Total OUT: 400 mL    Total NET: -310.2 mL        I&O's Summary    01 Dec 2023 07:01  -  02 Dec 2023 07:00  --------------------------------------------------------  IN: 89.8 mL / OUT: 400 mL / NET: -310.2 mL        PHYSICAL EXAM:    Intubated , Follows commands   + corneal reflexes bilaterally   Pupils 3 mm reactive bilaterally   WD bilateral UE's to noxious stimuli   WD bilateral LE's to noxious stimuli         LABS:                        10.6   16.29 )-----------( 169      ( 02 Dec 2023 04:00 )             33.2     12-02    138  |  106  |  29<H>  ----------------------------<  234<H>  3.3<L>   |  17  |  1.6<H>    Ca    7.1<L>      02 Dec 2023 04:00  Phos  2.8     12-02  Mg     1.7     12-02    TPro  5.7<L>  /  Alb  3.2<L>  /  TBili  1.0  /  DBili  x   /  AST  17  /  ALT  17  /  AlkPhos  116<H>  12-02    PT/INR - ( 02 Dec 2023 04:00 )   PT: 17.80 sec;   INR: 1.55 ratio         PTT - ( 02 Dec 2023 04:00 )  PTT:30.2 sec  Urinalysis Basic - ( 02 Dec 2023 04:00 )    Color: x / Appearance: x / SG: x / pH: x  Gluc: 234 mg/dL / Ketone: x  / Bili: x / Urobili: x   Blood: x / Protein: x / Nitrite: x   Leuk Esterase: x / RBC: x / WBC x   Sq Epi: x / Non Sq Epi: x / Bacteria: x      CARDIAC MARKERS ( 01 Dec 2023 21:40 )  x     / <0.01 ng/mL / x     / x     / x          CAPILLARY BLOOD GLUCOSE  140 (01 Dec 2023 21:20)      POCT Blood Glucose.: 223 mg/dL (02 Dec 2023 06:35)  POCT Blood Glucose.: 221 mg/dL (01 Dec 2023 23:56)      Drug Levels: [] N/A    CSF Analysis: [] N/A      Allergies    No Known Allergies    Intolerances      MEDICATIONS:  Antibiotics:    Neuro:  dexMEDEtomidine Infusion 0.5 MICROgram(s)/kG/Hr IV Continuous <Continuous>  fentaNYL    Injectable 25 MICROGram(s) IV Push every 2 hours PRN  fentaNYL    Injectable 50 MICROGram(s) IV Push every 2 hours PRN    Anticoagulation:    OTHER:  amLODIPine   Tablet 5 milliGRAM(s) Oral daily  chlorhexidine 0.12% Liquid 15 milliLiter(s) Oral Mucosa every 12 hours  chlorhexidine 2% Cloths 1 Application(s) Topical <User Schedule>  dextrose 50% Injectable 12.5 Gram(s) IV Push once  dextrose 50% Injectable 25 Gram(s) IV Push once  dextrose 50% Injectable 25 Gram(s) IV Push once  dextrose Oral Gel 15 Gram(s) Oral once PRN  famotidine    Tablet 20 milliGRAM(s) Oral daily  glucagon  Injectable 1 milliGRAM(s) IntraMuscular once  insulin lispro (ADMELOG) corrective regimen sliding scale   SubCutaneous every 6 hours  labetalol Injectable 10 milliGRAM(s) IV Push every 2 hours PRN  niCARdipine Infusion 5 mG/Hr IV Continuous <Continuous>    IVF:  dextrose 5%. 1000 milliLiter(s) IV Continuous <Continuous>  dextrose 5%. 1000 milliLiter(s) IV Continuous <Continuous>  phytonadione  IVPB 10 milliGRAM(s) IV Intermittent daily  potassium chloride   Powder 40 milliEquivalent(s) Oral every 2 hours  sodium chloride 3%. 500 milliLiter(s) IV Continuous <Continuous>    CULTURES:    RADIOLOGY & ADDITIONAL TESTS:      ASSESSMENT:  73y Male s/p    Hypertensive crisis    Yes    No pertinent family history in first degree relatives    Handoff    MEWS Score    TIA (transient ischemic attack)    Sciatica    HTN (hypertension)    Afib    Kidney stone    Right sided weakness    CVA (cerebrovascular accident)    Intraparenchymal hemorrhage of brain    No significant past surgical history    STROKE    TRANSFER    90+    Fall    [email or web address]      A/p               S/p Right Basal ganglia hemorrhage with intraventricular extension                     No Neurosurgical intervention

## 2023-12-02 NOTE — H&P ADULT - NSHPLABSRESULTS_GEN_ALL_CORE
LABS:  Na: 141 (12-01 @ 21:40)  K: 5.4 (12-01 @ 21:40)  Cl: 106 (12-01 @ 21:40)  CO2: 24 (12-01 @ 21:40)  BUN: 33 (12-01 @ 21:40)  Cr: 1.8 (12-01 @ 21:40)  Glu: 132(12-01 @ 21:40)    Hgb: 12.8 (12-01 @ 21:40)  Hct: 38.3 (12-01 @ 21:40)  WBC: 7.02 (12-01 @ 21:40)  Plt: 166 (12-01 @ 21:40)    INR: 2.81 12-01-23 @ 21:40  PTT: 25.4 12-01-23 @ 21:40        LIVER FUNCTIONS - ( 01 Dec 2023 21:40 )  Alb: 3.7 g/dL / Pro: 6.5 g/dL / ALK PHOS: 118 U/L / ALT: 22 U/L / AST: 43 U/L / GGT: x           < from: CT Brain Stroke Protocol (12.01.23 @ 21:51) >    IMPRESSION:    1.  Large right basal ganglia intraparenchymal hemorrhage with extension   into the posterior horn lateral ventricles, the aqueduct of Sylvius and   the 4th ventricle.  There is surrounding vasogenic edema. Etiology favors   a hypertensive hemorrhage.  2.  Leftward midline shift measures 1.3 cm.  3.  Moderate-severe chronic microvascular ischemic changes and chronic   infarct as above.    < end of copied text >

## 2023-12-02 NOTE — CONSULT NOTE ADULT - SUBJECTIVE AND OBJECTIVE BOX
Neurosurgery Consultation Note    HPI  73M PMH HTN/HLD, A fib (on coumadin), CVA with R sided residual weakness, presents found down my family.  Per sister who is the main care giver, the pt was in his normal state of health day, after dinner the sister her the pt fall and was found down with + LOC.  Pt was BIBA to ED [facility] site, presented with GSC 3 and intubated for airway protection.  CTH obtained demonstrating a large R BG IPH with extension into the posterior horn lateral ventricles, the aqueduct of Sylvius and the 4th ventricle.  At [facility] ED pt received mannitol, 23%, K centra and was transferred north for Neurosurgery and NeuroICU evaluation.    Pt seen and examined at the bedside.  At time of exam the pt was intubated on MV, sedated on propofol, Pt was triggering the MV, +gag, WD L sided extremities , TF to RLE.  I reviewed the results of the CTH with the pts sister and offered possible surgical intervention including placement of EVD catheter, after a lengthy discussion she expressed the pt did not wish for any aggressive surgical intervention, therefore the decision was made to pursue medical management.          Subjective: 73yMale with a pmhx of Yes    No pertinent family history in first degree relatives    MEWS Score    TIA (transient ischemic attack)    Sciatica    HTN (hypertension)    Afib    Kidney stone    Right sided weakness    CVA (cerebrovascular accident)    Intraparenchymal hemorrhage of brain    No significant past surgical history    STROKE    TRANSFER    90+    Fall    SysAdmin_VisitLink      s/p     Allergies    No Known Allergies    Intolerances        Vital Signs Last 24 Hrs  T(C): 37.1 (01 Dec 2023 22:03), Max: 37.1 (01 Dec 2023 22:03)  T(F): 98.7 (01 Dec 2023 22:03), Max: 98.7 (01 Dec 2023 22:03)  HR: 113 (02 Dec 2023 01:00) (100 - 118)  BP: 133/60 (02 Dec 2023 01:00) (133/60 - 220/103)  BP(mean): 86 (02 Dec 2023 01:00) (86 - 86)  RR: 18 (02 Dec 2023 01:00) (18 - 26)  SpO2: 100% (02 Dec 2023 01:00) (99% - 100%)      chlorhexidine 0.12% Liquid 15 milliLiter(s) Oral Mucosa every 12 hours  labetalol Injectable 10 milliGRAM(s) IV Push every 2 hours PRN  niCARdipine Infusion 5 mG/Hr IV Continuous <Continuous>  propofol Infusion 10 MICROgram(s)/kG/Min IV Continuous <Continuous>  sodium chloride 3%. 500 milliLiter(s) IV Continuous <Continuous>          REVIEW OF SYSTEMS    [ ] A ten-point review of systems was otherwise negative except as noted.  [ x] Due to altered mental status/intubation, subjective information were not able to be obtained from the patient. History was obtained, to the extent possible, from review of the chart and collateral sources of information.      Exam:  intubated ETT in place, on MV  triggering MV  + GAG  PERRLA  WD LLE  WD LUE  TF RLE  WD RUE        CBC Full  -  ( 01 Dec 2023 21:40 )  WBC Count : 7.02 K/uL  RBC Count : 4.58 M/uL  Hemoglobin : 12.8 g/dL  Hematocrit : 38.3 %  Platelet Count - Automated : 166 K/uL  Mean Cell Volume : 83.6 fL  Mean Cell Hemoglobin : 27.9 pg  Mean Cell Hemoglobin Concentration : 33.4 g/dL  Auto Neutrophil # : 5.12 K/uL  Auto Lymphocyte # : 1.26 K/uL  Auto Monocyte # : 0.41 K/uL  Auto Eosinophil # : 0.13 K/uL  Auto Basophil # : 0.04 K/uL  Auto Neutrophil % : 72.9 %  Auto Lymphocyte % : 17.9 %  Auto Monocyte % : 5.8 %  Auto Eosinophil % : 1.9 %  Auto Basophil % : 0.6 %    12-01    141  |  106  |  33<H>  ----------------------------<  132<H>  5.4<H>   |  24  |  1.8<H>    Ca    8.5      01 Dec 2023 21:40    TPro  6.5  /  Alb  3.7  /  TBili  0.4  /  DBili  x   /  AST  43<H>  /  ALT  22  /  AlkPhos  118<H>  12-01    PT/INR - ( 01 Dec 2023 21:40 )   PT: 32.40 sec;   INR: 2.81 ratio         PTT - ( 01 Dec 2023 21:40 )  PTT:25.4 sec        Imaging:    IMPRESSION:    1.  Large right basal ganglia intraparenchymal hemorrhage with extension   into the posterior horn lateral ventricles, the aqueduct of Sylvius and   the 4th ventricle.  There is surrounding vasogenic edema. Etiology favors   a hypertensive hemorrhage.  2.  Leftward midline shift measures 1.3 cm.  3.  Moderate-severe chronic microvascular ischemic changes and chronic   infarct as above.    Preliminary findings were discussed with KENNEY Rodriguez at 12/1/2023 9:51 PM.    --- End of Report ---          KAYKAY LEWIS MD; Resident Radiologist  This document has been electronically signed.  SILVIA DELGADILLO MD; Attending Interventional Radiologist  This document has been electronically signed. Dec  1 2023 11:00PM      Assessment/Plan:   74M PMH HTN/HLD, A fib (on coumadin), CVA with R sided residual weakness, presents found down with large R BG IPH with IVH extension      Images reviewed  Pt did not wish for any aggressive surgical intervention, family to sign DNR  SBP <140  Na goal 145-155  Care per NeuroICU  Neurosurgery Sign off, reconsult PRN, Neurosurgery team available for any questions    Plan and care discussed with Dr Franklin, NeuroICU and ED team.           Neurosurgery Consultation Note    HPI  73M PMH HTN/HLD, A fib (on coumadin), CVA with R sided residual weakness, presents found down my family.  Per sister who is the main care giver, the pt was in his normal state of health day, after dinner the sister her the pt fall and was found down with + LOC.  Pt was BIBA to ED [facility] site, presented with GSC 3 and intubated for airway protection.  CTH obtained demonstrating a large R BG IPH with extension into the posterior horn lateral ventricles, the aqueduct of Sylvius and the 4th ventricle.  At [facility] ED pt received mannitol, 23%, K centra and was transferred north for Neurosurgery and NeuroICU evaluation.    Pt seen and examined at the bedside.  At time of exam the pt was intubated on MV, sedated on propofol, Pt was triggering the MV, +gag, WD L sided extremities , TF to RLE.  I reviewed the results of the CTH with the pts sister and offered possible surgical intervention including placement of EVD catheter, after a lengthy discussion she expressed the pt did not wish for any aggressive surgical intervention, therefore the decision was made to pursue medical management.          Subjective: 73yMale with a pmhx of Yes    No pertinent family history in first degree relatives    MEWS Score    TIA (transient ischemic attack)    Sciatica    HTN (hypertension)    Afib    Kidney stone    Right sided weakness    CVA (cerebrovascular accident)    Intraparenchymal hemorrhage of brain    No significant past surgical history    STROKE    TRANSFER    90+    Fall    SysAdmin_VisitLink      s/p     Allergies    No Known Allergies    Intolerances        Vital Signs Last 24 Hrs  T(C): 37.1 (01 Dec 2023 22:03), Max: 37.1 (01 Dec 2023 22:03)  T(F): 98.7 (01 Dec 2023 22:03), Max: 98.7 (01 Dec 2023 22:03)  HR: 113 (02 Dec 2023 01:00) (100 - 118)  BP: 133/60 (02 Dec 2023 01:00) (133/60 - 220/103)  BP(mean): 86 (02 Dec 2023 01:00) (86 - 86)  RR: 18 (02 Dec 2023 01:00) (18 - 26)  SpO2: 100% (02 Dec 2023 01:00) (99% - 100%)      chlorhexidine 0.12% Liquid 15 milliLiter(s) Oral Mucosa every 12 hours  labetalol Injectable 10 milliGRAM(s) IV Push every 2 hours PRN  niCARdipine Infusion 5 mG/Hr IV Continuous <Continuous>  propofol Infusion 10 MICROgram(s)/kG/Min IV Continuous <Continuous>  sodium chloride 3%. 500 milliLiter(s) IV Continuous <Continuous>          REVIEW OF SYSTEMS    [ ] A ten-point review of systems was otherwise negative except as noted.  [ x] Due to altered mental status/intubation, subjective information were not able to be obtained from the patient. History was obtained, to the extent possible, from review of the chart and collateral sources of information.      Exam:  intubated ETT in place, on MV  triggering MV  + GAG  PERRLA  Pupils 3mm bl reactive  + corneal    WD LLE  WD LUE  TF RLE  WD RUE        CBC Full  -  ( 01 Dec 2023 21:40 )  WBC Count : 7.02 K/uL  RBC Count : 4.58 M/uL  Hemoglobin : 12.8 g/dL  Hematocrit : 38.3 %  Platelet Count - Automated : 166 K/uL  Mean Cell Volume : 83.6 fL  Mean Cell Hemoglobin : 27.9 pg  Mean Cell Hemoglobin Concentration : 33.4 g/dL  Auto Neutrophil # : 5.12 K/uL  Auto Lymphocyte # : 1.26 K/uL  Auto Monocyte # : 0.41 K/uL  Auto Eosinophil # : 0.13 K/uL  Auto Basophil # : 0.04 K/uL  Auto Neutrophil % : 72.9 %  Auto Lymphocyte % : 17.9 %  Auto Monocyte % : 5.8 %  Auto Eosinophil % : 1.9 %  Auto Basophil % : 0.6 %    12-01    141  |  106  |  33<H>  ----------------------------<  132<H>  5.4<H>   |  24  |  1.8<H>    Ca    8.5      01 Dec 2023 21:40    TPro  6.5  /  Alb  3.7  /  TBili  0.4  /  DBili  x   /  AST  43<H>  /  ALT  22  /  AlkPhos  118<H>  12-01    PT/INR - ( 01 Dec 2023 21:40 )   PT: 32.40 sec;   INR: 2.81 ratio         PTT - ( 01 Dec 2023 21:40 )  PTT:25.4 sec        Imaging:    IMPRESSION:    1.  Large right basal ganglia intraparenchymal hemorrhage with extension   into the posterior horn lateral ventricles, the aqueduct of Sylvius and   the 4th ventricle.  There is surrounding vasogenic edema. Etiology favors   a hypertensive hemorrhage.  2.  Leftward midline shift measures 1.3 cm.  3.  Moderate-severe chronic microvascular ischemic changes and chronic   infarct as above.    Preliminary findings were discussed with KENNEY Rodriguez at 12/1/2023 9:51 PM.    --- End of Report ---          KAYKAY LEWIS MD; Resident Radiologist  This document has been electronically signed.  SILVIA DELGADILLO MD; Attending Interventional Radiologist  This document has been electronically signed. Dec  1 2023 11:00PM      Assessment/Plan:   74M PMH HTN/HLD, A fib (on coumadin), CVA with R sided residual weakness, presents found down with large R BG IPH with IVH extension      Images reviewed  Pt did not wish for any aggressive surgical intervention, family to sign DNR  SBP <140  Na goal 145-155  Care per NeuroICU  Neurosurgery Sign off, reconsult PRN, Neurosurgery team available for any questions    Plan and care discussed with Dr Franklin, NeuroICU and ED team.

## 2023-12-02 NOTE — H&P ADULT - NSHPPHYSICALEXAM_GEN_ALL_CORE
EXAMINATION:  General: Intubated  HENT: NC/AT, moist oral mucosa  Eyes: Anicteric sclerae  Cardiac: [id number]  Lungs: Clear  Abdomen: Soft, non-tender, +BS  Extremities: b/l LE edema  Skin/Incision Site: Clean, dry and intact  Neurologic: Sedation held for assessment. Pt intubated, no eye opening to noxious, no command following, no spontaneous movements, no BTT b/l. Pupils 3mm, reactive b/l, midline gaze, +oculocephalic/cough/corneals b/l. Triggering vent. WD LUE/LLE, trace WD RUE, TF RLE.

## 2023-12-02 NOTE — PATIENT PROFILE ADULT - FALL HARM RISK - HARM RISK INTERVENTIONS

## 2023-12-02 NOTE — H&P ADULT - CRITICAL CARE ATTENDING COMMENT
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  ICH 4    Patient with devastating spontaneous ICH with herniation/IVH/hydrocephalus s/p hyperosmolar therapy for neuroresuscitation, coagulopathy reversal and BP control  Pt family opting against neurosurgical intervention    patient made DNR by family    pall care consult    poor neurologic prognosis likely

## 2023-12-02 NOTE — ED ADULT NURSE REASSESSMENT NOTE - NS ED NURSE REASSESS COMMENT FT1
unable to start pt on 3% as pt only has 2 IV access to LUE with propofol and cardene infusing. attempted to contact neuro PA, neuro ICU res extension and ICU resident with no call back/response.   unable to use RUE for access at this time d/t swelling and wheeping from south site IV.

## 2023-12-02 NOTE — PROGRESS NOTE ADULT - ASSESSMENT
73M with PMHx of Afib on Coumadin, HTN, HLD, CVA 17years ago w/ residual R sided weakness and expressive aphasia. Patient presented to the ED after collapsing in the bathroom at home. Patient was found with large R basal ganglia ICH w/ SOHAM  ICH 4    ICH  c/b brain herniation, IVH, hydrocephalus  Hypertensive Emergency,   Acute Respiratory Failure due ICH  - Patient vented  - sister signed DNR in the ER  - BP control as per neuro    Afib anticoagulated on Coumadin prior to adm  - rate controlled  - anticoagulation reversed in ED  - patient known to Dr Mccall    Hx of prior CVA with aphasia and right sided weakness  - patient was ambulatory with quad cane, WC used for distance  HLD    JOHN, on CKD 3  - trend labs    Attempted to call sister, failed  Continue supportive measures, will follow

## 2023-12-02 NOTE — PATIENT PROFILE ADULT - NSPROGENSOURCEINFO_GEN_A_NUR
Fellow contacted me 20:39 stating that he received page from nurse #[id number], called  to see why pages were not being sent to my mobile device.  investigated and saw that my phone and none of the other NPs'- Diabetes Specialty/Endocrinology phones are activated in System ([employer]). I instructed  to make sure that all pages are sent directly to my mobile device that is on file.   Our night call switch w/ endocrinology is at 1900.   Spoke w/ night nurseMemo, pt at shift change was at 71 u/hr, then 81 u/hr, BG remain globally elevated >350 mg/dl.  Discussed kidney functions and uop, pt has ckd3, uop~45 cc/hr. Recently received lasix.   Nurse instructed to follow protocol, then call endocrine at 2200.   Nurse now has my direct phone # for any issues overnight.        Instructions/orders via nursing communication to decrease by 80% if BG is less than 250 mg/dl,   At shift change, insulin gtt was changed from peripheral to central line.  Pt has + temp, out of OR BG >500.   Will watch closely tonight. Nurse has direct phone line.   unable to respond

## 2023-12-02 NOTE — SWALLOW BEDSIDE ASSESSMENT ADULT - SWALLOW EVAL: DIAGNOSIS
Pt intubated at time of SLP arrival not appropriate for PO trials given respiratory status. Recommend NPO. SLP to d/c.

## 2023-12-02 NOTE — ED ADULT NURSE REASSESSMENT NOTE - NS ED NURSE REASSESS COMMENT FT1
Pt BIBA from [facility] S intubated with mannitol, propofol and cardene infusing via IVs. RUE noted to be red, swelling and oozing. IV removed from R AC. MD made aware of the same. VS stable. Pt wife at bedside, updated on pt status and to be admitted to neuro ICU.

## 2023-12-02 NOTE — H&P ADULT - ASSESSMENT
ASSESSMENT/PLAN:     NEURO:  q1h coma checks  6hr CTH  CT Head in AM, MRI post-op, Surgical drains per NSGY, Pain control  Activity: [] OOB as tolerated [] Bedrest [] PT [] OT [] PMNR    PULM:  Lung protective vent settings  HOB>30    CV:  -140  Nicardipine gtt, Labetalol PRN    RENAL:  S/p 23.4% NaCl, mannitol bolus  3% NaCl @30mL/hr  Goal Na 145-155  Serial BMPs  [place]  Monitor I&Os    GI:  NPO  GI prophylaxis [x] not indicated [] PPI [] pepcid  Bowel regimen [] miralax [] senna [] other:    ENDO:   Goal -180    HEME/ONC:  VTE prophylaxis: [x] SCDs [] chemoprophylaxis [x] hold chemoprophylaxis due to: ICH    ID:  Maintain normothermia    MISC:    SOCIAL/FAMILY:  [] awaiting [x] updated at bedside [] family meeting    CODE STATUS:  [] Full Code [x] DNR [] DNI [] Palliative/Comfort Care    DISPOSITION:  [x] ICU [] Stroke Unit [] Floor [] CEU [] Tele ASSESSMENT/PLAN: 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  ICH 4      NEURO:  q1h coma checks  6hr CTH  Evaluated by NSGY- pt's sister declining surgical interventions  Sedation- Propofol  Activity: [] OOB as tolerated [x] Bedrest [] PT [] OT [] PMNR    PULM:  Lung protective vent settings  HOB>30    CV:  -140  Nicardipine gtt, Labetalol PRN    RENAL:  S/p 23.4% NaCl, mannitol bolus  3% NaCl @30mL/hr  Goal Na 145-155  Serial BMPs  [place]erson  Monitor I&Os    GI:  NPO  GI prophylaxis [x] not indicated [] PPI [] pepcid  Bowel regimen [] miralax [] senna [] other:    ENDO:   Goal -180    HEME/ONC:  VTE prophylaxis: [x] SCDs [] chemoprophylaxis [x] hold chemoprophylaxis due to: ICH  S/p Kcentra and Vitamin K for Coumadin reversal  Repeat coags    ID:  Maintain normothermia    MISC:    SOCIAL/FAMILY:  [] awaiting [x] updated at bedside:  Discussion held at bedside with pt's sister (pt's caretaker and decision maker) regarding CT results. NSGY discussed possibility of surgical intervention, including EVD. Sister states that since stroke 17yrs ago pt has consistently expressed that in the event of another stroke or similarly debilitating event he would not want aggressive interventions to prolong his life, and he would not want any surgery. Sister in agreement with continuing current medical management, expresses understanding that this will not reverse hemorrhage and that pt is at high risk of cerebral edema, rebleeding, and deterioration. Discussed code status, sister expressed that in the event that pt's heart were to stop he would not want CPR or attempts at resuscitation; pt made DNR.    CODE STATUS:  [] Full Code [x] DNR [] DNI [] Palliative/Comfort Care    DISPOSITION:  [x] ICU [] Stroke Unit [] Floor [] CEU [] Tele ASSESSMENT/PLAN: 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  ICH 4    sICH likely due to hypertensive emergency, c/b brain herniation, IVH, hydrocephalus  acute respiratory failure due ICH  afib  lactic acidosis  JOHN      NEURO:  q1h coma checks  6hr CTH worsened  Evaluated by NSGY- pt's sister declining surgical interventions  IV hyperosmolar therapy   s/p coagulopathy reversal  sbp 110-140  Sedation- precedex  liveOn consult  Activity: [] OOB as tolerated [x] Bedrest [] PT [] OT [] PMNR    PULM:  Lung protective vent settings  HOB>30  neurologically unstable for PSV    CV:  -140  Nicardipine gtt, Labetalol PRN  at home pt on norvasc, toprol, enalipril, digoxin, statin, coumadin, lasix  lipid profile  coumadin held given ICH  echo    RENAL:  renal insufficiency  S/p 23.4% NaCl, mannitol bolus  3% NaCl @30mL/hr  Goal Na 145-155  Serial BMPs  E[place]erson  Monitor I&Os    GI:  NPO, OGT in place, initiate trophic feeds  GI prophylaxis [x] not indicated [] PPI [x] pepcid  Bowel regimen [] miralax [] senna [] other:    ENDO:   Goal -180  a1c and tfts    HEME/ONC:  VTE prophylaxis: [x] SCDs [] chemoprophylaxis [x] hold chemoprophylaxis due to: ICH  S/p Kcentra and Vitamin K for Coumadin reversal; c/w vit K  Repeat coags    ID:  Maintain normothermia    MISC:    SOCIAL/FAMILY:  [] awaiting [x] updated at bedside:  Discussion held at bedside with pt's sister (pt's caretaker and decision maker) regarding CT results. NSGY discussed possibility of surgical intervention, including EVD. Sister states that since stroke 17yrs ago pt has consistently expressed that in the event of another stroke or similarly debilitating event he would not want aggressive interventions to prolong his life, and he would not want any surgery. Sister in agreement with continuing current medical management, expresses understanding that this will not reverse hemorrhage and that pt is at high risk of cerebral edema, rebleeding, and deterioration. Discussed code status, sister expressed that in the event that pt's heart were to stop he would not want CPR or attempts at resuscitation; pt made DNR.    CODE STATUS:  [] Full Code [x] DNR [] DNI [] Palliative/Comfort Care    DISPOSITION:  [x] ICU [] Stroke Unit [] Floor [] CEU [] Tele

## 2023-12-02 NOTE — H&P ADULT - HISTORY OF PRESENT ILLNESS
73M w/ PMHx of Afib on Coumadin, HTN, HLD, CVA 17yrs ago w/ residual R sided weakness (ambulates w/ cane at baseline, requires assistance w/ ADLs, lives with his sister who is his primary caretaker) BIBEMS to [facility] ED after pt's sister found him unresponsive. Pt's sister states they were eating dinner, pt at around 8pm pt went to use bathroom. Sister heard thud from bathroom, attempted to open door but pt had fallen in front of door and she was unable to reach him. Sister called EMS, who were able to open door and found pt unresponsive on the floor with sonorous respirations. GCS 3 on arrival, pt noted to have equal/reactive pupils. Intubated for airway protection. Had CTH showing large R basal ganglia IPH w/ SOHAM, surrounding vasogenic edema, and 1.3cm leftward MLS. SBP 160s, pt started on Nicardipine gtt. INR 2.81, pt given Kcentra and Vitamin K for reversal of Coumadin. Hyperosmolar therapy administered Pt transferred to [facility]  Admission scores:  GCS 3  ICH 4 73M w/ PMHx of Afib on Coumadin, HTN, HLD, CVA 17yrs ago w/ residual R sided weakness (ambulates w/ cane at baseline, requires assistance w/ ADLs, lives with his sister who is his primary caretaker) BIBEMS to [facility] ED after pt's sister found him unresponsive. Pt's sister states they were eating dinner, pt at around 8pm pt went to use bathroom. Sister heard thud from bathroom, attempted to open door but pt had fallen in front of door and she was unable to reach him. Sister called EMS, who were able to open door and found pt unresponsive on the floor with sonorous respirations. GCS 3 on arrival, pt noted to have equal/reactive pupils. Intubated for airway protection. Had CTH showing large R basal ganglia IPH w/ SOHAM, surrounding vasogenic edema, and 1.3cm leftward MLS. SBP 160s, pt started on Nicardipine gtt. INR 2.81, pt given Kcentra and Vitamin K for reversal of Coumadin. Hyperosmolar therapy administered Pt transferred to [facility]      Admission scores:  GCS 3  ICH 4 73M w/ PMHx of Afib on Coumadin, HTN, HLD, CVA 17yrs ago w/ residual R sided weakness (ambulates w/ cane at baseline, requires assistance w/ ADLs, lives with his sister who is his primary caretaker) BIBEMS to [facility] ED after pt's sister found him unresponsive. Pt's sister states they were eating dinner, pt at around 8pm pt went to use bathroom. Sister heard thud from bathroom, attempted to open door but pt had fallen in front of door and she was unable to reach him. Sister called EMS, who were able to open door and found pt unresponsive on the floor with sonorous respirations. GCS 3 on arrival, pt noted to have equal/reactive pupils. Intubated for airway protection. Had CTH showing large R basal ganglia IPH w/ SOHAM, surrounding vasogenic edema, and 1.3cm leftward MLS. SBP 160s, pt started on Nicardipine gtt. INR 2.81, pt given Kcentra and Vitamin K for reversal of Coumadin. Hyperosmolar therapy administered Pt transferred to [facility].      Admission scores:  GCS 3  ICH 4  nihss 23

## 2023-12-03 NOTE — PROVIDER CONTACT NOTE (OTHER) - SITUATION
Pt max on nicardipine drip. Pt received labetolol PRN push and Fentlyn PRN push for pain. Pt not in blood pressure range 110-140

## 2023-12-03 NOTE — PROCEDURE NOTE - NSPROCNAME_GEN_A_CORE
Show Aperture Variable?: Yes Central Line Insertion Render Note In Bullet Format When Appropriate: No Post-Care Instructions: I reviewed with the patient in detail post-care instructions. Patient is to wear sunprotection, and avoid picking at any of the treated lesions. Pt may apply Vaseline to crusted or scabbing areas. Detail Level: Detailed Consent: The patient's consent was obtained including but not limited to risks of crusting, scabbing, blistering, scarring, darker or lighter pigmentary change, recurrence, incomplete removal and infection. Duration Of Freeze Thaw-Cycle (Seconds): 6 Number Of Freeze-Thaw Cycles: 2 freeze-thaw cycles

## 2023-12-03 NOTE — PROGRESS NOTE ADULT - ASSESSMENT
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  ICH 4    sICH likely due to hypertensive emergency, c/b brain herniation, IVH, hydrocephalus  acute respiratory failure due ICH  afib  lactic acidosis  JOHN      NEURO:  q1h coma checks  cth now  Evaluated by NSGY- pt's sister declining surgical interventions  IV hyperosmolar therapy   patient with poor neurologic functional outcome expected with optimal medical manage and with or without neurosurgical intervention   vEEG  Sedation- precedex  liveOn consult  Activity: [] OOB as tolerated [x] Bedrest [] PT [] OT [] PMNR    PULM:  Lung protective vent settings  PSV as tolerates today  HOB>30    CV:  -140  Nicardipine gtt, Labetalol PRN  at home pt on norvasc, toprol, enalipril, digoxin, statin, coumadin, lasix  LDL  echo- lvef 45%, RV moderate reduced function, PASP 50 mmHg  coumadin held and reversed given ICH  echo    RENAL:  renal insufficiency  Epperson in place  goal Na 145-155    GI:  NPO, OGT in place, initiate trophic feeds  GI prophylaxis [x] not indicated [] PPI [x] pepcid while on ventilator  Bowel regimen [] miralax [] senna [] other:  LBM 12/2    ENDO:   Goal -180  a1c and tfts    HEME/ONC:  VTE prophylaxis: [x] SCDs [] chemoprophylaxis [x] hold chemoprophylaxis due to: ICH (recent coumadin intake at home)  S/p Kcentra and Vitamin K for Coumadin reversal; c/w vit K  Repeat coags q 3 days     ID:  Maintain normothermia    MISC:    SOCIAL/FAMILY:  family to be updated     CODE STATUS:  [] Full Code [x] DNR [] DNI [x] Palliative/Comfort Care    DISPOSITION:  [x] ICU [] Stroke Unit [] Floor [] CEU [] Tele

## 2023-12-03 NOTE — PROGRESS NOTE ADULT - SUBJECTIVE AND OBJECTIVE BOX
73M w/ PMHx of Afib on Coumadin, HTN, HLD, CVA 17yrs ago w/ residual R sided weakness (ambulates w/ cane at baseline, requires assistance w/ ADLs, lives with his sister who is his primary caretaker) BIBEMS to [facility] ([place] ED after pt's sister found him unresponsive. Pt's sister states they were eating dinner, pt at around 8pm pt went to use bathroom. Sister heard thud from bathroom, attempted to open door but pt had fallen in front of door and she was unable to reach him. Sister called EMS, who were able to open door and found pt unresponsive on the floor with sonorous respirations. GCS 3 on arrival, pt noted to have equal/reactive pupils. Intubated for airway protection. Had CTH showing large R basal ganglia IPH w/ SOHAM, surrounding vasogenic edema, and 1.3cm leftward MLS. SBP 160s, pt started on Nicardipine gtt. INR 2.81, pt given Kcentra and Vitamin K for reversal of Coumadin. Hyperosmolar therapy administered Pt transferred to [facility].      Admission scores:  GCS 3  ICH 4  nihss 23      OVERNIGHT EVENTS: poor exam      SEDATION SCORES:  RASS: CAM-ICU:       ROS deferred due to neurologic status        DEVICES:   [] Restraints [] PIVs [] ET tube [] central line [] PICC [] arterial line [] crowe [] NGT/OGT [] EVD [] LD [] HENNY/HMV [] LiCOX [] ICP monitor [] Trach [] PEG [] Chest Tube [] other:    EXAMINATION:  General: ill appearing  HEENT: Anicteric sclerae, orally intuabted  Cardiac: [id number]  Lungs: Clear  Abdomen: Soft, non-tender, +BS  Extremities: No c/c/e  Skin/Incision Site: +3 b/l UE and LE edema with venous stasis changes/brawning  Neurologic: stuporous, no EO, downward gaze, perrl 2mm, WD RUE, LUE extensor, b/l LE TFs              ICU Vital Signs Last 24 Hrs  T(C): 36.4 (03 Dec 2023 08:00), Max: 36.4 (02 Dec 2023 12:00)  T(F): 97.6 (03 Dec 2023 08:00), Max: 97.6 (03 Dec 2023 08:00)  HR: 81 (03 Dec 2023 09:15) (46 - 89)  BP: 169/78 (03 Dec 2023 09:15) (99/54 - 192/88)  BP(mean): 112 (03 Dec 2023 09:15) (70 - 127)  ABP: --  ABP(mean): --  RR: 25 (03 Dec 2023 09:15) (20 - 37)  SpO2: 99% (03 Dec 2023 09:15) (98% - 100%)      12-02-23 @ 07:01  -  12-03-23 @ 07:00  --------------------------------------------------------  IN: 1343.6 mL / OUT: 2230 mL / NET: -886.4 mL    12-03-23 @ 07:01  -  12-03-23 @ 10:02  --------------------------------------------------------  IN: 184 mL / OUT: 50 mL / NET: 134 mL        Mode: AC/ CMV (Assist Control/ Continuous Mandatory Ventilation), RR (machine): 20, TV (machine): 450, FiO2: 40, PEEP: 5, ITime: 1, MAP: 9, PIP: 19    amLODIPine   Tablet 5 milliGRAM(s) Oral daily  chlorhexidine 0.12% Liquid 15 milliLiter(s) Oral Mucosa every 12 hours  chlorhexidine 2% Cloths 1 Application(s) Topical <User Schedule>  dexMEDEtomidine Infusion 0.5 MICROgram(s)/kG/Hr (12.2 mL/Hr) IV Continuous <Continuous>  dextrose 5%. 1000 milliLiter(s) (50 mL/Hr) IV Continuous <Continuous>  dextrose 5%. 1000 milliLiter(s) (100 mL/Hr) IV Continuous <Continuous>  dextrose 50% Injectable 25 Gram(s) IV Push once  dextrose 50% Injectable 25 Gram(s) IV Push once  dextrose 50% Injectable 12.5 Gram(s) IV Push once  dextrose Oral Gel 15 Gram(s) Oral once PRN  famotidine    Tablet 20 milliGRAM(s) Oral daily  fentaNYL    Injectable 50 MICROGram(s) IV Push every 2 hours PRN  fentaNYL    Injectable 25 MICROGram(s) IV Push every 2 hours PRN  glucagon  Injectable 1 milliGRAM(s) IntraMuscular once  insulin lispro (ADMELOG) corrective regimen sliding scale   SubCutaneous every 6 hours  labetalol Injectable 10 milliGRAM(s) IV Push every 2 hours PRN  niCARdipine Infusion 5 mG/Hr (25 mL/Hr) IV Continuous <Continuous>  phytonadione  IVPB 10 milliGRAM(s) IV Intermittent daily      LABS:  Na: 150 (12-03 @ 04:30), 138 (12-02 @ 04:00), 141 (12-01 @ 21:40)  K: 5.6 (12-03 @ 04:30), 3.3 (12-02 @ 04:00), 5.4 (12-01 @ 21:40)  Cl: 120 (12-03 @ 04:30), 106 (12-02 @ 04:00), 106 (12-01 @ 21:40)  CO2: 19 (12-03 @ 04:30), 17 (12-02 @ 04:00), 24 (12-01 @ 21:40)  BUN: 34 (12-03 @ 04:30), 29 (12-02 @ 04:00), 33 (12-01 @ 21:40)  Cr: 1.6 (12-03 @ 04:30), 1.6 (12-02 @ 04:00), 1.8 (12-01 @ 21:40)  Glu: 141(12-03 @ 04:30), 234(12-02 @ 04:00), 132(12-01 @ 21:40)    Hgb: 10.8 (12-03 @ 04:30), 10.6 (12-02 @ 04:00), 12.8 (12-01 @ 21:40)  Hct: 33.7 (12-03 @ 04:30), 33.2 (12-02 @ 04:00), 38.3 (12-01 @ 21:40)  WBC: 11.97 (12-03 @ 04:30), 16.29 (12-02 @ 04:00), 7.02 (12-01 @ 21:40)  Plt: 154 (12-03 @ 04:30), 169 (12-02 @ 04:00), 166 (12-01 @ 21:40)    INR: 1.18 12-03-23 @ 04:30, 1.36 12-02-23 @ 09:55, 1.55 12-02-23 @ 04:00, 2.81 12-01-23 @ 21:40  PTT: 29.8 12-03-23 @ 04:30, 30.5 12-02-23 @ 09:55, 30.2 12-02-23 @ 04:00, 25.4 12-01-23 @ 21:40          LIVER FUNCTIONS - ( 03 Dec 2023 04:30 )  Alb: 3.1 g/dL / Pro: 5.7 g/dL / ALK PHOS: 115 U/L / ALT: 14 U/L / AST: 15 U/L / GGT: x           ABG - ( 03 Dec 2023 04:01 )  pH, Arterial: 7.36  pH, Blood: x     /  pCO2: 41    /  pO2: 127   / HCO3: 23    / Base Excess: -2.1  /  SaO2: 100.0                73M w/ PMHx of Afib on Coumadin, HTN, HLD, CVA 17yrs ago w/ residual R sided weakness (ambulates w/ cane at baseline, requires assistance w/ ADLs, lives with his sister who is his primary caretaker) BIBEMS to [facility] ED after pt's sister found him unresponsive. Pt's sister states they were eating dinner, pt at around 8pm pt went to use bathroom. Sister heard thud from bathroom, attempted to open door but pt had fallen in front of door and she was unable to reach him. Sister called EMS, who were able to open door and found pt unresponsive on the floor with sonorous respirations. GCS 3 on arrival, pt noted to have equal/reactive pupils. Intubated for airway protection. Had CTH showing large R basal ganglia IPH w/ SOHAM, surrounding vasogenic edema, and 1.3cm leftward MLS. SBP 160s, pt started on Nicardipine gtt. INR 2.81, pt given Kcentra and Vitamin K for reversal of Coumadin. Hyperosmolar therapy administered Pt transferred to [facility].      Admission scores:  GCS 3  ICH 4  nihss 23      OVERNIGHT EVENTS: poor exam      SEDATION SCORES:  RASS: CAM-ICU:       ROS deferred due to neurologic status        DEVICES:   [] Restraints [] PIVs [] ET tube [] central line [] PICC [] arterial line [] crowe [] NGT/OGT [] EVD [] LD [] HENNY/HMV [] LiCOX [] ICP monitor [] Trach [] PEG [] Chest Tube [] other:    EXAMINATION:  General: ill appearing  HEENT: Anicteric sclerae, orally intuabted  Cardiac: [id number]  Lungs: Clear  Abdomen: Soft, non-tender, +BS  Extremities: No c/c/e  Skin/Incision Site: +3 b/l UE and LE edema with venous stasis changes/brawning  Neurologic: stuporous, no EO, downward gaze, perrl 2mm, WD RUE, LUE extensor, b/l LE TFs              ICU Vital Signs Last 24 Hrs  T(C): 36.4 (03 Dec 2023 08:00), Max: 36.4 (02 Dec 2023 12:00)  T(F): 97.6 (03 Dec 2023 08:00), Max: 97.6 (03 Dec 2023 08:00)  HR: 81 (03 Dec 2023 09:15) (46 - 89)  BP: 169/78 (03 Dec 2023 09:15) (99/54 - 192/88)  BP(mean): 112 (03 Dec 2023 09:15) (70 - 127)  ABP: --  ABP(mean): --  RR: 25 (03 Dec 2023 09:15) (20 - 37)  SpO2: 99% (03 Dec 2023 09:15) (98% - 100%)      12-02-23 @ 07:01  -  12-03-23 @ 07:00  --------------------------------------------------------  IN: 1343.6 mL / OUT: 2230 mL / NET: -886.4 mL    12-03-23 @ 07:01  -  12-03-23 @ 10:02  --------------------------------------------------------  IN: 184 mL / OUT: 50 mL / NET: 134 mL        Mode: AC/ CMV (Assist Control/ Continuous Mandatory Ventilation), RR (machine): 20, TV (machine): 450, FiO2: 40, PEEP: 5, ITime: 1, MAP: 9, PIP: 19    amLODIPine   Tablet 5 milliGRAM(s) Oral daily  chlorhexidine 0.12% Liquid 15 milliLiter(s) Oral Mucosa every 12 hours  chlorhexidine 2% Cloths 1 Application(s) Topical <User Schedule>  dexMEDEtomidine Infusion 0.5 MICROgram(s)/kG/Hr (12.2 mL/Hr) IV Continuous <Continuous>  dextrose 5%. 1000 milliLiter(s) (50 mL/Hr) IV Continuous <Continuous>  dextrose 5%. 1000 milliLiter(s) (100 mL/Hr) IV Continuous <Continuous>  dextrose 50% Injectable 25 Gram(s) IV Push once  dextrose 50% Injectable 25 Gram(s) IV Push once  dextrose 50% Injectable 12.5 Gram(s) IV Push once  dextrose Oral Gel 15 Gram(s) Oral once PRN  famotidine    Tablet 20 milliGRAM(s) Oral daily  fentaNYL    Injectable 50 MICROGram(s) IV Push every 2 hours PRN  fentaNYL    Injectable 25 MICROGram(s) IV Push every 2 hours PRN  glucagon  Injectable 1 milliGRAM(s) IntraMuscular once  insulin lispro (ADMELOG) corrective regimen sliding scale   SubCutaneous every 6 hours  labetalol Injectable 10 milliGRAM(s) IV Push every 2 hours PRN  niCARdipine Infusion 5 mG/Hr (25 mL/Hr) IV Continuous <Continuous>  phytonadione  IVPB 10 milliGRAM(s) IV Intermittent daily      LABS:  Na: 150 (12-03 @ 04:30), 138 (12-02 @ 04:00), 141 (12-01 @ 21:40)  K: 5.6 (12-03 @ 04:30), 3.3 (12-02 @ 04:00), 5.4 (12-01 @ 21:40)  Cl: 120 (12-03 @ 04:30), 106 (12-02 @ 04:00), 106 (12-01 @ 21:40)  CO2: 19 (12-03 @ 04:30), 17 (12-02 @ 04:00), 24 (12-01 @ 21:40)  BUN: 34 (12-03 @ 04:30), 29 (12-02 @ 04:00), 33 (12-01 @ 21:40)  Cr: 1.6 (12-03 @ 04:30), 1.6 (12-02 @ 04:00), 1.8 (12-01 @ 21:40)  Glu: 141(12-03 @ 04:30), 234(12-02 @ 04:00), 132(12-01 @ 21:40)    Hgb: 10.8 (12-03 @ 04:30), 10.6 (12-02 @ 04:00), 12.8 (12-01 @ 21:40)  Hct: 33.7 (12-03 @ 04:30), 33.2 (12-02 @ 04:00), 38.3 (12-01 @ 21:40)  WBC: 11.97 (12-03 @ 04:30), 16.29 (12-02 @ 04:00), 7.02 (12-01 @ 21:40)  Plt: 154 (12-03 @ 04:30), 169 (12-02 @ 04:00), 166 (12-01 @ 21:40)    INR: 1.18 12-03-23 @ 04:30, 1.36 12-02-23 @ 09:55, 1.55 12-02-23 @ 04:00, 2.81 12-01-23 @ 21:40  PTT: 29.8 12-03-23 @ 04:30, 30.5 12-02-23 @ 09:55, 30.2 12-02-23 @ 04:00, 25.4 12-01-23 @ 21:40          LIVER FUNCTIONS - ( 03 Dec 2023 04:30 )  Alb: 3.1 g/dL / Pro: 5.7 g/dL / ALK PHOS: 115 U/L / ALT: 14 U/L / AST: 15 U/L / GGT: x           ABG - ( 03 Dec 2023 04:01 )  pH, Arterial: 7.36  pH, Blood: x     /  pCO2: 41    /  pO2: 127   / HCO3: 23    / Base Excess: -2.1  /  SaO2: 100.0                73M w/ PMHx of Afib on Coumadin, HTN, HLD, CVA 17yrs ago w/ residual R sided weakness (ambulates w/ cane at baseline, requires assistance w/ ADLs, lives with his sister who is his primary caretaker) BIBEMS to [facility] ED after pt's sister found him unresponsive. Pt's sister states they were eating dinner, pt at around 8pm pt went to use bathroom. Sister heard thud from bathroom, attempted to open door but pt had fallen in front of door and she was unable to reach him. Sister called EMS, who were able to open door and found pt unresponsive on the floor with sonorous respirations. GCS 3 on arrival, pt noted to have equal/reactive pupils. Intubated for airway protection. Had CTH showing large R basal ganglia IPH w/ SOHAM, surrounding vasogenic edema, and 1.3cm leftward MLS. SBP 160s, pt started on Nicardipine gtt. INR 2.81, pt given Kcentra and Vitamin K for reversal of Coumadin. Hyperosmolar therapy administered Pt transferred to [facility].      Admission scores:  GCS 3  ICH 4  nihss 23      OVERNIGHT EVENTS: poor exam      SEDATION SCORES:  RASS: CAM-ICU:       ROS deferred due to neurologic status        DEVICES:   [] Restraints [] PIVs [] ET tube [] central line [] PICC [] arterial line [] crowe [] NGT/OGT [] EVD [] LD [] HENNY/HMV [] LiCOX [] ICP monitor [] Trach [] PEG [] Chest Tube [] other:    EXAMINATION:  General: ill appearing  HEENT: Anicteric sclerae, orally intuabted  Cardiac: [id number]  Lungs: Clear  Abdomen: Soft, non-tender, +BS  Extremities: No c/c/e  Skin/Incision Site: +3 b/l UE and LE edema with venous stasis changes/brawning  Neurologic: stuporous, no EO, downward gaze, perrl 2mm, WD RUE, LUE extensor, b/l LE TFs              ICU Vital Signs Last 24 Hrs  T(C): 36.4 (03 Dec 2023 08:00), Max: 36.4 (02 Dec 2023 12:00)  T(F): 97.6 (03 Dec 2023 08:00), Max: 97.6 (03 Dec 2023 08:00)  HR: 81 (03 Dec 2023 09:15) (46 - 89)  BP: 169/78 (03 Dec 2023 09:15) (99/54 - 192/88)  BP(mean): 112 (03 Dec 2023 09:15) (70 - 127)  ABP: --  ABP(mean): --  RR: 25 (03 Dec 2023 09:15) (20 - 37)  SpO2: 99% (03 Dec 2023 09:15) (98% - 100%)      12-02-23 @ 07:01  -  12-03-23 @ 07:00  --------------------------------------------------------  IN: 1343.6 mL / OUT: 2230 mL / NET: -886.4 mL    12-03-23 @ 07:01  -  12-03-23 @ 10:02  --------------------------------------------------------  IN: 184 mL / OUT: 50 mL / NET: 134 mL        Mode: AC/ CMV (Assist Control/ Continuous Mandatory Ventilation), RR (machine): 20, TV (machine): 450, FiO2: 40, PEEP: 5, ITime: 1, MAP: 9, PIP: 19    amLODIPine   Tablet 5 milliGRAM(s) Oral daily  chlorhexidine 0.12% Liquid 15 milliLiter(s) Oral Mucosa every 12 hours  chlorhexidine 2% Cloths 1 Application(s) Topical <User Schedule>  dexMEDEtomidine Infusion 0.5 MICROgram(s)/kG/Hr (12.2 mL/Hr) IV Continuous <Continuous>  dextrose 5%. 1000 milliLiter(s) (50 mL/Hr) IV Continuous <Continuous>  dextrose 5%. 1000 milliLiter(s) (100 mL/Hr) IV Continuous <Continuous>  dextrose 50% Injectable 25 Gram(s) IV Push once  dextrose 50% Injectable 25 Gram(s) IV Push once  dextrose 50% Injectable 12.5 Gram(s) IV Push once  dextrose Oral Gel 15 Gram(s) Oral once PRN  famotidine    Tablet 20 milliGRAM(s) Oral daily  fentaNYL    Injectable 50 MICROGram(s) IV Push every 2 hours PRN  fentaNYL    Injectable 25 MICROGram(s) IV Push every 2 hours PRN  glucagon  Injectable 1 milliGRAM(s) IntraMuscular once  insulin lispro (ADMELOG) corrective regimen sliding scale   SubCutaneous every 6 hours  labetalol Injectable 10 milliGRAM(s) IV Push every 2 hours PRN  niCARdipine Infusion 5 mG/Hr (25 mL/Hr) IV Continuous <Continuous>  phytonadione  IVPB 10 milliGRAM(s) IV Intermittent daily      LABS:  Na: 150 (12-03 @ 04:30), 138 (12-02 @ 04:00), 141 (12-01 @ 21:40)  K: 5.6 (12-03 @ 04:30), 3.3 (12-02 @ 04:00), 5.4 (12-01 @ 21:40)  Cl: 120 (12-03 @ 04:30), 106 (12-02 @ 04:00), 106 (12-01 @ 21:40)  CO2: 19 (12-03 @ 04:30), 17 (12-02 @ 04:00), 24 (12-01 @ 21:40)  BUN: 34 (12-03 @ 04:30), 29 (12-02 @ 04:00), 33 (12-01 @ 21:40)  Cr: 1.6 (12-03 @ 04:30), 1.6 (12-02 @ 04:00), 1.8 (12-01 @ 21:40)  Glu: 141(12-03 @ 04:30), 234(12-02 @ 04:00), 132(12-01 @ 21:40)    Hgb: 10.8 (12-03 @ 04:30), 10.6 (12-02 @ 04:00), 12.8 (12-01 @ 21:40)  Hct: 33.7 (12-03 @ 04:30), 33.2 (12-02 @ 04:00), 38.3 (12-01 @ 21:40)  WBC: 11.97 (12-03 @ 04:30), 16.29 (12-02 @ 04:00), 7.02 (12-01 @ 21:40)  Plt: 154 (12-03 @ 04:30), 169 (12-02 @ 04:00), 166 (12-01 @ 21:40)    INR: 1.18 12-03-23 @ 04:30, 1.36 12-02-23 @ 09:55, 1.55 12-02-23 @ 04:00, 2.81 12-01-23 @ 21:40  PTT: 29.8 12-03-23 @ 04:30, 30.5 12-02-23 @ 09:55, 30.2 12-02-23 @ 04:00, 25.4 12-01-23 @ 21:40          LIVER FUNCTIONS - ( 03 Dec 2023 04:30 )  Alb: 3.1 g/dL / Pro: 5.7 g/dL / ALK PHOS: 115 U/L / ALT: 14 U/L / AST: 15 U/L / GGT: x           ABG - ( 03 Dec 2023 04:01 )  pH, Arterial: 7.36  pH, Blood: x     /  pCO2: 41    /  pO2: 127   / HCO3: 23    / Base Excess: -2.1  /  SaO2: 100.0

## 2023-12-04 NOTE — DIETITIAN INITIAL EVALUATION ADULT - [id number]_CORE
IBW: 80.9 kg.    12-03-23 @ 07:01  -  12-04-23 @ 07:00  --------------------------------------------------------  OUT:  Total OUT: 0 mL    Total NET: 1380 mL      12-04-23 @ 07:01  -  12-04-23 @ 22:01  --------------------------------------------------------  OUT:  Total OUT: 0 mL    Total NET: 980 mL

## 2023-12-04 NOTE — PROGRESS NOTE ADULT - SUBJECTIVE AND OBJECTIVE BOX
Chart reviewed, patient examined. Pertinent results reviewed.  Case discussed with HO; specialist f/u reviewed  HD#3; in ICU intubated-D3  MARCELO BASSETT    HPI:  73M w/ PMHx of Afib on Coumadin, HTN, HLD, CVA 17yrs ago w/ residual R sided weakness (ambulates w/ cane at baseline, requires assistance w/ ADLs, lives with his sister who is his primary caretaker) BIBEMS to [facility] ED after pt's sister found him unresponsive. Pt's sister states they were eating dinner, pt at around 8pm on DOA & pt went to use bathroom. Sister heard thud from bathroom, attempted to open door but pt had fallen in front of door and she was unable to reach him. Sister called EMS, who were able to open door and found pt unresponsive on the floor with sonorous respirations. GCS 3 on arrival, pt noted to have equal/reactive pupils. Intubated for airway protection. Had CTH showing large R basal ganglia IPH w/ SOHAM, surrounding vasogenic edema, and 1.3cm leftward MLS. SBP 160s, pt started on Nicardipine gtt. INR 2.81, pt given Kcentra and Vitamin K for reversal of Coumadin. Hyperosmolar therapy administered Pt transferred to [facility]. He remains intubated. His family elected no aggressive surgery, and signed DNR.      Admission scores:  GCS 3  ICH 4  nihss 23 (02 Dec 2023 01:48)    INTERVAL EVENTS: Patient seen today, chart reviewed.  Still intubated in NICU    MEDICATIONS  (STANDING):  amLODIPine   Tablet 5 milliGRAM(s) Oral daily  chlorhexidine 0.12% Liquid 15 milliLiter(s) Oral Mucosa every 12 hours  chlorhexidine 2% Cloths 1 Application(s) Topical <User Schedule>  dexMEDEtomidine Infusion 0.493 MICROgram(s)/kG/Hr (12.2 mL/Hr) IV Continuous <Continuous>  dextrose 5%. 1000 milliLiter(s) (100 mL/Hr) IV Continuous <Continuous>  dextrose 5%. 1000 milliLiter(s) (50 mL/Hr) IV Continuous <Continuous>  dextrose 50% Injectable 25 Gram(s) IV Push once  dextrose 50% Injectable 25 Gram(s) IV Push once  dextrose 50% Injectable 12.5 Gram(s) IV Push once  famotidine    Tablet 20 milliGRAM(s) Oral daily  furosemide    Tablet 40 milliGRAM(s) Oral daily  glucagon  Injectable 1 milliGRAM(s) IntraMuscular once  insulin lispro (ADMELOG) corrective regimen sliding scale   SubCutaneous every 6 hours  niCARdipine Infusion 5 mG/Hr (25 mL/Hr) IV Continuous <Continuous>  phytonadione  IVPB 10 milliGRAM(s) IV Intermittent daily  polyethylene glycol 3350 17 Gram(s) Oral every 12 hours  senna 2 Tablet(s) Oral at bedtime    MEDICATIONS  (PRN):  dextrose Oral Gel 15 Gram(s) Oral once PRN Blood Glucose LESS THAN 70 milliGRAM(s)/deciliter  fentaNYL    Injectable 25 MICROGram(s) IV Push every 2 hours PRN CPOT 4-5  fentaNYL    Injectable 50 MICROGram(s) IV Push every 2 hours PRN CPOT 6-8  labetalol Injectable 10 milliGRAM(s) IV Push every 2 hours PRN Systolic blood pressure > 140      Vital Signs Last 24 Hrs  T(C): 37.4 (04 Dec 2023 08:00), Max: 37.5 (04 Dec 2023 00:00)  T(F): 99.4 (04 Dec 2023 08:00), Max: 99.5 (04 Dec 2023 00:00)  HR: 102 (04 Dec 2023 08:45) (64 - 119)  BP: 157/70 (04 Dec 2023 08:30) (123/56 - 186/77)  BP(mean): 100 (04 Dec 2023 08:30) (81 - 114)  RR: 32 (04 Dec 2023 08:45) (24 - 37)  SpO2: 95% (04 Dec 2023 08:45) (92% - 100%)    Parameters below as of 04 Dec 2023 08:00  Patient On (Oxygen Delivery Method): ventilator    O2 Concentration (%): 40  PHYSICAL EXAM:  GENERAL: Patient vented, unresponsive, bruising to the right forehead  NECK: Supple, No JVD,  CHEST/LUNG: Transmittable sounds  HEART: S1, S2, irregular   ABDOMEN: Soft, Nontender, Nondistended; Bowel sounds present  EXTREMITIES: Chronic edema, R > L      LABS:                          10.6   16.29 )-----------( 169      ( 02 Dec 2023 04:00 )             33.2             12-02    138  |  106  |  29<H>  ----------------------------<  234<H>  3.3<L>   |  17  |  1.6<H>    Ca    7.1<L>      02 Dec 2023 04:00  Phos  2.8     12-02  Mg     1.7     12-02    TPro  5.7<L>  /  Alb  3.2<L>  /  TBili  1.0  /  DBili  x   /  AST  17  /  ALT  17  /  AlkPhos  116<H>  12-02    LIVER FUNCTIONS - ( 02 Dec 2023 04:00 )  Alb: 3.2 g/dL / Pro: 5.7 g/dL / ALK PHOS: 116 U/L / ALT: 17 U/L / AST: 17 U/L / GGT: x                   PT/INR - ( 02 Dec 2023 09:55 )   PT: 15.50 sec;   INR: 1.36 ratio         PTT - ( 02 Dec 2023 09:55 )  PTT:30.5 sec  CARDIAC MARKERS ( 01 Dec 2023 21:40 )  x     / <0.01 ng/mL / x     / x     / x          ABG - ( 02 Dec 2023 09:54 )  pH, Arterial: 7.37  pH, Blood: x     /  pCO2: 41    /  pO2: 169   / HCO3: 24    / Base Excess: -1.5  /  SaO2: 99.6          Urinalysis Basic - ( 02 Dec 2023 04:00 )    Color: x / Appearance: x / SG: x / pH: x  Gluc: 234 mg/dL / Ketone: x  / Bili: x / Urobili: x   Blood: x / Protein: x / Nitrite: x   Leuk Esterase: x / RBC: x / WBC x   Sq Epi: x / Non Sq Epi: x / Bacteria: x        < from: 12 Lead ECG (12.01.23 @ 22:05) >    Ventricular Rate 70 BPM    Atrial Rate 340 BPM    QRS Duration 120 ms    Q-T Interval 382 ms    QTC Calculation(Bazett) 412 ms    R Axis 58 degrees    T Axis 102 degrees    Diagnosis Line    Atrial fibrillation  Left ventricular hypertrophy with QRS widening  ST & T wave abnormality, consider lateral ischemia or digitalis effect  Abnormal ECG    < end of copied text >      RADIOLOGY & ADDITIONAL TESTS:  < from: Xray Chest 1 View- PORTABLE-Urgent (Xray Chest 1 View- PORTABLE-Urgent .) (12.02.23 @ 06:57) >    PROCEDURE DATE:  12/02/2023          INTERPRETATION:  Clinical History / Reason for exam: Follow-up.    Comparison : Chest radiograph December 1, 2023.    Technique/Positioning: Low lung volume.    Findings:    Support devices: ETT with its tip above the anshul and NGT with its tip   below the diaphragm.    Cardiac/mediastinum/hilum: Stable    Lung parenchyma/Pleura: Within normal limits.    Skeleton/soft tissues: Stable    Impression:    Low lung volume.    No acute infiltrates.    Support tubes and lines as above.    --- End of Report ---    < end of copied text >  < from: CT Head No Cont (12.02.23 @ 05:04) >    INTERPRETATION:  CLINICAL HISTORY: Intracranial hemorrhage, follow-up.    COMPARISON: CT head 12/1/2023.    TECHNIQUE: Contiguous unenhanced CT axial images of the head with coronal   and sagittal reformats without the administration of IV contrast.    FINDINGS:    There is increased size of large right basal ganglia intraparenchymal   hemorrhage measuring approximately 10.6 x 7.9 cm, previously 9.3 x 6.6 cm   (measured on 2/20). There is increased intraventricular hemorrhage   predominantly layering within the posterior horn lateral ventricles,   temporal horns, third, and fourth ventricles. There is worsening   subfalcine herniation, central herniation as well as uncal herniation.    There is new intraparenchymal hemorrhage in the left frontal and parietal   lobes.    There is prominence of the sulci, sylvian fissures, and ventricles likely   reflecting mild diffuse parenchymal volume loss.    There are scattered patchy low attenuations in bilateral periventricular   cerebral white matter consistent with severe chronic microvascular   ischemic changes. Stable left frontoparietal and occipital gliosis and   encephalomalacia from chronic infarct.    No acute osseous abnormality or depressed skull fracture. Impacted right   molar tooth. The mastoid air cells are aerated bilaterally. Mild ethmoid   and maxillary sinus mucosal thickening. The visualized orbits are   unremarkable.    IMPRESSION:  Since the recent prior head CT there is increased large acute hemorrhage   centered within the right basal ganglia with intraventricular extension   as well as new hemorrhage within the left frontal and parietal lobes.    Worsening trapping of the left lateral ventricle as well as subfalcine,   central as well as uncal herniation.    These findings were discussed with KENNEY ALVA [id number] at 12/2/2023   5:47 AM by Dr. Av with read back confirmation.    --- End of Report ---    < end of copied text >  < from: CT Brain Stroke Protocol (12.01.23 @ 21:51) >    INTERPRETATION:  CLINICAL HISTORY: Code stroke.    COMPARISON: CT head 7/17/2022.    TECHNIQUE: Contiguous unenhanced CT axial images of the head with coronal   and sagittal reformats without the administration of IV contrast.    FINDINGS:    Large right basal ganglia intraparenchymal hemorrhage with surrounding   vasogenic edema and extension into the posterior horn lateral ventricles,   the aqueduct of Sylvius and the 4th ventricle. There is 1.3 cm leftward   midline shift.    There is prominence of the sulci, sylvian fissures, and ventricles likely   reflecting mild diffuse parenchymal volume loss. Redemonstration of   extensive left frontal, parietal, and occipital gliosis and   encephalomalacia compatible with chronic infarct.    There are scattered patchy low attenuations in bilateral periventricular   cerebral white matter consistent with moderate-severe chronic   microvascular ischemic changes.    There is no diffuse ventriculomegaly.    No acute osseous abnormality or depressed skull fracture. Impacted right   molar tooth. The mastoid air cells are aerated bilaterally. Moderate   ethmoid mucosal thickening. Mildfrontal and maxillary mucosal   thickening. The visualized orbits are unremarkable.    IMPRESSION:    1.  Large right basal ganglia intraparenchymal hemorrhage with extension   into the posterior horn lateral ventricles, the aqueduct of Sylvius and   the 4th ventricle.  There is surrounding vasogenic edema. Etiology favors   a hypertensive hemorrhage.  2.  Leftward midline shift measures 1.3 cm.  3.  Moderate-severe chronic microvascular ischemic changes and chronic   infarct as above.    Preliminary findings were discussed with KENNEY Rodriguez at 12/1/2023 9:51 PM.    --- End of Report ---          < end of copied text >   Chart reviewed, patient examined. Pertinent results reviewed.  Case discussed with HO; specialist f/u reviewed  HD#3; in ICU intubated-D3  MARCELO BASSETT    HPI:  73M w/ PMHx of Afib on Coumadin, HTN, HLD, CVA 17yrs ago w/ residual R sided weakness (ambulates w/ cane at baseline, requires assistance w/ ADLs, lives with his sister who is his primary caretaker) BIBEMS to [facility] ED after pt's sister found him unresponsive. Pt's sister states they were eating dinner, pt at around 8pm on DOA & pt went to use bathroom. Sister heard thud from bathroom, attempted to open door but pt had fallen in front of door and she was unable to reach him. Sister called EMS, who were able to open door and found pt unresponsive on the floor with sonorous respirations. GCS 3 on arrival, pt noted to have equal/reactive pupils. Intubated for airway protection. Had CTH showing large R basal ganglia IPH w/ SOHAM, surrounding vasogenic edema, and 1.3cm leftward MLS. SBP 160s, pt started on Nicardipine gtt. INR 2.81, pt given Kcentra and Vitamin K for reversal of Coumadin. Hyperosmolar therapy administered Pt transferred to [facility]. He remains intubated. His family elected no aggressive surgery, and signed DNR.      Admission scores:  GCS 3  ICH 4  nihss 23 (02 Dec 2023 01:48)    INTERVAL EVENTS: Patient seen today, chart reviewed.  Still intubated in NICU    MEDICATIONS  (STANDING):  amLODIPine   Tablet 5 milliGRAM(s) Oral daily  chlorhexidine 0.12% Liquid 15 milliLiter(s) Oral Mucosa every 12 hours  chlorhexidine 2% Cloths 1 Application(s) Topical <User Schedule>  dexMEDEtomidine Infusion 0.493 MICROgram(s)/kG/Hr (12.2 mL/Hr) IV Continuous <Continuous>  dextrose 5%. 1000 milliLiter(s) (100 mL/Hr) IV Continuous <Continuous>  dextrose 5%. 1000 milliLiter(s) (50 mL/Hr) IV Continuous <Continuous>  dextrose 50% Injectable 25 Gram(s) IV Push once  dextrose 50% Injectable 25 Gram(s) IV Push once  dextrose 50% Injectable 12.5 Gram(s) IV Push once  famotidine    Tablet 20 milliGRAM(s) Oral daily  furosemide    Tablet 40 milliGRAM(s) Oral daily  glucagon  Injectable 1 milliGRAM(s) IntraMuscular once  insulin lispro (ADMELOG) corrective regimen sliding scale   SubCutaneous every 6 hours  niCARdipine Infusion 5 mG/Hr (25 mL/Hr) IV Continuous <Continuous>  phytonadione  IVPB 10 milliGRAM(s) IV Intermittent daily  polyethylene glycol 3350 17 Gram(s) Oral every 12 hours  senna 2 Tablet(s) Oral at bedtime    MEDICATIONS  (PRN):  dextrose Oral Gel 15 Gram(s) Oral once PRN Blood Glucose LESS THAN 70 milliGRAM(s)/deciliter  fentaNYL    Injectable 25 MICROGram(s) IV Push every 2 hours PRN CPOT 4-5  fentaNYL    Injectable 50 MICROGram(s) IV Push every 2 hours PRN CPOT 6-8  labetalol Injectable 10 milliGRAM(s) IV Push every 2 hours PRN Systolic blood pressure > 140      Vital Signs Last 24 Hrs  T(C): 37.4 (04 Dec 2023 08:00), Max: 37.5 (04 Dec 2023 00:00)  T(F): 99.4 (04 Dec 2023 08:00), Max: 99.5 (04 Dec 2023 00:00)  HR: 102 (04 Dec 2023 08:45) (64 - 119)  BP: 157/70 (04 Dec 2023 08:30) (123/56 - 186/77)  BP(mean): 100 (04 Dec 2023 08:30) (81 - 114)  RR: 32 (04 Dec 2023 08:45) (24 - 37)  SpO2: 95% (04 Dec 2023 08:45) (92% - 100%)    Parameters below as of 04 Dec 2023 08:00  Patient On (Oxygen Delivery Method): ventilator    O2 Concentration (%): 40  PHYSICAL EXAM:  GENERAL: Patient vented, unresponsive, bruising to the right forehead  NECK: Supple, No JVD,  CHEST/LUNG: Transmittable sounds  HEART: S1, S2, irregular   ABDOMEN: Soft, Nontender, Nondistended; Bowel sounds present  EXTREMITIES: Chronic edema, R > L      LABS:                          10.6   16.29 )-----------( 169      ( 02 Dec 2023 04:00 )             33.2             12-02    138  |  106  |  29<H>  ----------------------------<  234<H>  3.3<L>   |  17  |  1.6<H>    Ca    7.1<L>      02 Dec 2023 04:00  Phos  2.8     12-02  Mg     1.7     12-02    TPro  5.7<L>  /  Alb  3.2<L>  /  TBili  1.0  /  DBili  x   /  AST  17  /  ALT  17  /  AlkPhos  116<H>  12-02    LIVER FUNCTIONS - ( 02 Dec 2023 04:00 )  Alb: 3.2 g/dL / Pro: 5.7 g/dL / ALK PHOS: 116 U/L / ALT: 17 U/L / AST: 17 U/L / GGT: x                   PT/INR - ( 02 Dec 2023 09:55 )   PT: 15.50 sec;   INR: 1.36 ratio         PTT - ( 02 Dec 2023 09:55 )  PTT:30.5 sec  CARDIAC MARKERS ( 01 Dec 2023 21:40 )  x     / <0.01 ng/mL / x     / x     / x          ABG - ( 02 Dec 2023 09:54 )  pH, Arterial: 7.37  pH, Blood: x     /  pCO2: 41    /  pO2: 169   / HCO3: 24    / Base Excess: -1.5  /  SaO2: 99.6          Urinalysis Basic - ( 02 Dec 2023 04:00 )    Color: x / Appearance: x / SG: x / pH: x  Gluc: 234 mg/dL / Ketone: x  / Bili: x / Urobili: x   Blood: x / Protein: x / Nitrite: x   Leuk Esterase: x / RBC: x / WBC x   Sq Epi: x / Non Sq Epi: x / Bacteria: x        < from: 12 Lead ECG (12.01.23 @ 22:05) >    Ventricular Rate 70 BPM    Atrial Rate 340 BPM    QRS Duration 120 ms    Q-T Interval 382 ms    QTC Calculation(Bazett) 412 ms    R Axis 58 degrees    T Axis 102 degrees    Diagnosis Line    Atrial fibrillation  Left ventricular hypertrophy with QRS widening  ST & T wave abnormality, consider lateral ischemia or digitalis effect  Abnormal ECG    < end of copied text >      RADIOLOGY & ADDITIONAL TESTS:  < from: Xray Chest 1 View- PORTABLE-Urgent (Xray Chest 1 View- PORTABLE-Urgent .) (12.02.23 @ 06:57) >    PROCEDURE DATE:  12/02/2023          INTERPRETATION:  Clinical History / Reason for exam: Follow-up.    Comparison : Chest radiograph December 1, 2023.    Technique/Positioning: Low lung volume.    Findings:    Support devices: ETT with its tip above the anshul and NGT with its tip   below the diaphragm.    Cardiac/mediastinum/hilum: Stable    Lung parenchyma/Pleura: Within normal limits.    Skeleton/soft tissues: Stable    Impression:    Low lung volume.    No acute infiltrates.    Support tubes and lines as above.    --- End of Report ---    < end of copied text >  < from: CT Head No Cont (12.02.23 @ 05:04) >    INTERPRETATION:  CLINICAL HISTORY: Intracranial hemorrhage, follow-up.    COMPARISON: CT head 12/1/2023.    TECHNIQUE: Contiguous unenhanced CT axial images of the head with coronal   and sagittal reformats without the administration of IV contrast.    FINDINGS:    There is increased size of large right basal ganglia intraparenchymal   hemorrhage measuring approximately 10.6 x 7.9 cm, previously 9.3 x 6.6 cm   (measured on 2/20). There is increased intraventricular hemorrhage   predominantly layering within the posterior horn lateral ventricles,   temporal horns, third, and fourth ventricles. There is worsening   subfalcine herniation, central herniation as well as uncal herniation.    There is new intraparenchymal hemorrhage in the left frontal and parietal   lobes.    There is prominence of the sulci, sylvian fissures, and ventricles likely   reflecting mild diffuse parenchymal volume loss.    There are scattered patchy low attenuations in bilateral periventricular   cerebral white matter consistent with severe chronic microvascular   ischemic changes. Stable left frontoparietal and occipital gliosis and   encephalomalacia from chronic infarct.    No acute osseous abnormality or depressed skull fracture. Impacted right   molar tooth. The mastoid air cells are aerated bilaterally. Mild ethmoid   and maxillary sinus mucosal thickening. The visualized orbits are   unremarkable.    IMPRESSION:  Since the recent prior head CT there is increased large acute hemorrhage   centered within the right basal ganglia with intraventricular extension   as well as new hemorrhage within the left frontal and parietal lobes.    Worsening trapping of the left lateral ventricle as well as subfalcine,   central as well as uncal herniation.    These findings were discussed with KENNEY ALVA [id number] at 12/2/2023   5:47 AM by Dr. Av with read back confirmation.    --- End of Report ---    < end of copied text >  < from: CT Brain Stroke Protocol (12.01.23 @ 21:51) >    INTERPRETATION:  CLINICAL HISTORY: Code stroke.    COMPARISON: CT head 7/17/2022.    TECHNIQUE: Contiguous unenhanced CT axial images of the head with coronal   and sagittal reformats without the administration of IV contrast.    FINDINGS:    Large right basal ganglia intraparenchymal hemorrhage with surrounding   vasogenic edema and extension into the posterior horn lateral ventricles,   the aqueduct of Sylvius and the 4th ventricle. There is 1.3 cm leftward   midline shift.    There is prominence of the sulci, sylvian fissures, and ventricles likely   reflecting mild diffuse parenchymal volume loss. Redemonstration of   extensive left frontal, parietal, and occipital gliosis and   encephalomalacia compatible with chronic infarct.    There are scattered patchy low attenuations in bilateral periventricular   cerebral white matter consistent with moderate-severe chronic   microvascular ischemic changes.    There is no diffuse ventriculomegaly.    No acute osseous abnormality or depressed skull fracture. Impacted right   molar tooth. The mastoid air cells are aerated bilaterally. Moderate   ethmoid mucosal thickening. Mildfrontal and maxillary mucosal   thickening. The visualized orbits are unremarkable.    IMPRESSION:    1.  Large right basal ganglia intraparenchymal hemorrhage with extension   into the posterior horn lateral ventricles, the aqueduct of Sylvius and   the 4th ventricle.  There is surrounding vasogenic edema. Etiology favors   a hypertensive hemorrhage.  2.  Leftward midline shift measures 1.3 cm.  3.  Moderate-severe chronic microvascular ischemic changes and chronic   infarct as above.    Preliminary findings were discussed with KENNEY Rodriguez at 12/1/2023 9:51 PM.    --- End of Report ---          < end of copied text >   Chart reviewed, patient examined. Pertinent results reviewed.  Case discussed with HO; specialist f/u reviewed  HD#3; in ICU intubated-D3  MARCELO BASSETT    HPI:  73M w/ PMHx of Afib on Coumadin, HTN, HLD, CVA 17yrs ago w/ residual R sided weakness (ambulates w/ cane at baseline, requires assistance w/ ADLs, lives with his sister who is his primary caretaker) BIBEMS to [facility] ED after pt's sister found him unresponsive. Pt's sister states they were eating dinner, pt at around 8pm on DOA & pt went to use bathroom. Sister heard thud from bathroom, attempted to open door but pt had fallen in front of door and she was unable to reach him. Sister called EMS, who were able to open door and found pt unresponsive on the floor with sonorous respirations. GCS 3 on arrival, pt noted to have equal/reactive pupils. Intubated for airway protection. Had CTH showing large R basal ganglia IPH w/ SOHAM, surrounding vasogenic edema, and 1.3cm leftward MLS. SBP 160s, pt started on Nicardipine gtt. INR 2.81, pt given Kcentra and Vitamin K for reversal of Coumadin. Hyperosmolar therapy administered Pt transferred to [facility]. He remains intubated. His family elected no aggressive surgery, and signed DNR.      Admission scores:  GCS 3  ICH 4  nihss 23 (02 Dec 2023 01:48)    INTERVAL EVENTS: Patient seen today, chart reviewed.  Still intubated in NICU    MEDICATIONS  (STANDING):  amLODIPine   Tablet 5 milliGRAM(s) Oral daily  chlorhexidine 0.12% Liquid 15 milliLiter(s) Oral Mucosa every 12 hours  chlorhexidine 2% Cloths 1 Application(s) Topical <User Schedule>  dexMEDEtomidine Infusion 0.493 MICROgram(s)/kG/Hr (12.2 mL/Hr) IV Continuous <Continuous>  dextrose 5%. 1000 milliLiter(s) (100 mL/Hr) IV Continuous <Continuous>  dextrose 5%. 1000 milliLiter(s) (50 mL/Hr) IV Continuous <Continuous>  dextrose 50% Injectable 25 Gram(s) IV Push once  dextrose 50% Injectable 25 Gram(s) IV Push once  dextrose 50% Injectable 12.5 Gram(s) IV Push once  famotidine    Tablet 20 milliGRAM(s) Oral daily  furosemide    Tablet 40 milliGRAM(s) Oral daily  glucagon  Injectable 1 milliGRAM(s) IntraMuscular once  insulin lispro (ADMELOG) corrective regimen sliding scale   SubCutaneous every 6 hours  niCARdipine Infusion 5 mG/Hr (25 mL/Hr) IV Continuous <Continuous>  phytonadione  IVPB 10 milliGRAM(s) IV Intermittent daily  polyethylene glycol 3350 17 Gram(s) Oral every 12 hours  senna 2 Tablet(s) Oral at bedtime    MEDICATIONS  (PRN):  dextrose Oral Gel 15 Gram(s) Oral once PRN Blood Glucose LESS THAN 70 milliGRAM(s)/deciliter  fentaNYL    Injectable 25 MICROGram(s) IV Push every 2 hours PRN CPOT 4-5  fentaNYL    Injectable 50 MICROGram(s) IV Push every 2 hours PRN CPOT 6-8  labetalol Injectable 10 milliGRAM(s) IV Push every 2 hours PRN Systolic blood pressure > 140      Vital Signs Last 24 Hrs  T(C): 37.4 (04 Dec 2023 08:00), Max: 37.5 (04 Dec 2023 00:00)  T(F): 99.4 (04 Dec 2023 08:00), Max: 99.5 (04 Dec 2023 00:00)  HR: 102 (04 Dec 2023 08:45) (64 - 119)  BP: 157/70 (04 Dec 2023 08:30) (123/56 - 186/77)  BP(mean): 100 (04 Dec 2023 08:30) (81 - 114)  RR: 32 (04 Dec 2023 08:45) (24 - 37)  SpO2: 95% (04 Dec 2023 08:45) (92% - 100%)    Parameters below as of 04 Dec 2023 08:00  Patient On (Oxygen Delivery Method): ventilator    O2 Concentration (%): 40 on vent- 20/450/5/40  PHYSICAL EXAM:  GENERAL: Patient vented, sedated, unresponsive, bruising to the right forehead  NECK: Supple, No JVD,  CHEST/LUNG: Transmittable sounds  HEART: IRR IRR  ABDOMEN: Soft, Nontender, Nondistended; Bowel sounds present; obese  EXTREMITIES: Chronic edema, R > L      LABS:                          11.3   11.50 )-----------( 202      ( 04 Dec 2023 05:05 )             36.6                           10.6   16.29 )-----------( 169      ( 02 Dec 2023 04:00 )             33.2     12-04    152<H>  |  120<H>  |  48<H>  ----------------------------<  136<H>  4.0   |  22  |  2.1<H>    Ca    8.9      04 Dec 2023 05:05  Phos  1.0     12-04  Mg     2.3     12-04    TPro  5.6<L>  /  Alb  3.0<L>  /  TBili  1.2  /  DBili  x   /  AST  13  /  ALT  13  /  AlkPhos  108  12-04              12-02    138  |  106  |  29<H>  ----------------------------<  234<H>  3.3<L>   |  17  |  1.6<H>    Ca    7.1<L>      02 Dec 2023 04:00  Phos  2.8     12-02  Mg     1.7     12-02    TPro  5.7<L>  /  Alb  3.2<L>  /  TBili  1.0  /  DBili  x   /  AST  17  /  ALT  17  /  AlkPhos  116<H>  12-02    LIVER FUNCTIONS - ( 02 Dec 2023 04:00 )  Alb: 3.2 g/dL / Pro: 5.7 g/dL / ALK PHOS: 116 U/L / ALT: 17 U/L / AST: 17 U/L / GGT: x                   PT/INR - ( 02 Dec 2023 09:55 )   PT: 15.50 sec;   INR: 1.36 ratio         PTT - ( 02 Dec 2023 09:55 )  PTT:30.5 sec  CARDIAC MARKERS ( 01 Dec 2023 21:40 )  x     / <0.01 ng/mL / x     / x     / x          ABG - ( 02 Dec 2023 09:54 )  pH, Arterial: 7.37  pH, Blood: x     /  pCO2: 41    /  pO2: 169   / HCO3: 24    / Base Excess: -1.5  /  SaO2: 99.6          Urinalysis Basic - ( 02 Dec 2023 04:00 )    Color: x / Appearance: x / SG: x / pH: x  Gluc: 234 mg/dL / Ketone: x  / Bili: x / Urobili: x   Blood: x / Protein: x / Nitrite: x   Leuk Esterase: x / RBC: x / WBC x   Sq Epi: x / Non Sq Epi: x / Bacteria: x        < from: 12 Lead ECG (12.01.23 @ 22:05) >    Ventricular Rate 70 BPM    Atrial Rate 340 BPM    QRS Duration 120 ms    Q-T Interval 382 ms    QTC Calculation(Bazett) 412 ms    R Axis 58 degrees    T Axis 102 degrees    Diagnosis Line    Atrial fibrillation  Left ventricular hypertrophy with QRS widening  ST & T wave abnormality, consider lateral ischemia or digitalis effect  Abnormal ECG    < end of copied text >      RADIOLOGY & ADDITIONAL TESTS:  < from: Xray Chest 1 View- PORTABLE-Urgent (Xray Chest 1 View- PORTABLE-Urgent .) (12.02.23 @ 06:57) >    PROCEDURE DATE:  12/02/2023          INTERPRETATION:  Clinical History / Reason for exam: Follow-up.    Comparison : Chest radiograph December 1, 2023.    Technique/Positioning: Low lung volume.    Findings:    Support devices: ETT with its tip above the anshul and NGT with its tip   below the diaphragm.    Cardiac/mediastinum/hilum: Stable    Lung parenchyma/Pleura: Within normal limits.    Skeleton/soft tissues: Stable    Impression:    Low lung volume.    No acute infiltrates.    Support tubes and lines as above.    --- End of Report ---    < end of copied text >  < from: CT Head No Cont (12.02.23 @ 05:04) >    INTERPRETATION:  CLINICAL HISTORY: Intracranial hemorrhage, follow-up.    COMPARISON: CT head 12/1/2023.    TECHNIQUE: Contiguous unenhanced CT axial images of the head with coronal   and sagittal reformats without the administration of IV contrast.    FINDINGS:    There is increased size of large right basal ganglia intraparenchymal   hemorrhage measuring approximately 10.6 x 7.9 cm, previously 9.3 x 6.6 cm   (measured on 2/20). There is increased intraventricular hemorrhage   predominantly layering within the posterior horn lateral ventricles,   temporal horns, third, and fourth ventricles. There is worsening   subfalcine herniation, central herniation as well as uncal herniation.    There is new intraparenchymal hemorrhage in the left frontal and parietal   lobes.    There is prominence of the sulci, sylvian fissures, and ventricles likely   reflecting mild diffuse parenchymal volume loss.    There are scattered patchy low attenuations in bilateral periventricular   cerebral white matter consistent with severe chronic microvascular   ischemic changes. Stable left frontoparietal and occipital gliosis and   encephalomalacia from chronic infarct.    No acute osseous abnormality or depressed skull fracture. Impacted right   molar tooth. The mastoid air cells are aerated bilaterally. Mild ethmoid   and maxillary sinus mucosal thickening. The visualized orbits are   unremarkable.    IMPRESSION:  Since the recent prior head CT there is increased large acute hemorrhage   centered within the right basal ganglia with intraventricular extension   as well as new hemorrhage within the left frontal and parietal lobes.    Worsening trapping of the left lateral ventricle as well as subfalcine,   central as well as uncal herniation.    These findings were discussed with KENNEY ALVA [id number] at 12/2/2023   5:47 AM by Dr. Ley with read back confirmation.    --- End of Report ---    < end of copied text >  < from: CT Brain Stroke Protocol (12.01.23 @ 21:51) >    INTERPRETATION:  CLINICAL HISTORY: Code stroke.    COMPARISON: CT head 7/17/2022.    TECHNIQUE: Contiguous unenhanced CT axial images of the head with coronal   and sagittal reformats without the administration of IV contrast.    FINDINGS:    Large right basal ganglia intraparenchymal hemorrhage with surrounding   vasogenic edema and extension into the posterior horn lateral ventricles,   the aqueduct of Sylvius and the 4th ventricle. There is 1.3 cm leftward   midline shift.    There is prominence of the sulci, sylvian fissures, and ventricles likely   reflecting mild diffuse parenchymal volume loss. Redemonstration of   extensive left frontal, parietal, and occipital gliosis and   encephalomalacia compatible with chronic infarct.    There are scattered patchy low attenuations in bilateral periventricular   cerebral white matter consistent with moderate-severe chronic   microvascular ischemic changes.    There is no diffuse ventriculomegaly.    No acute osseous abnormality or depressed skull fracture. Impacted right   molar tooth. The mastoid air cells are aerated bilaterally. Moderate   ethmoid mucosal thickening. Mildfrontal and maxillary mucosal   thickening. The visualized orbits are unremarkable.    IMPRESSION:    1.  Large right basal ganglia intraparenchymal hemorrhage with extension   into the posterior horn lateral ventricles, the aqueduct of Sylvius and   the 4th ventricle.  There is surrounding vasogenic edema. Etiology favors   a hypertensive hemorrhage.  2.  Leftward midline shift measures 1.3 cm.  3.  Moderate-severe chronic microvascular ischemic changes and chronic   infarct as above.    Preliminary findings were discussed with KENNEY Rodriguez at 12/1/2023 9:51 PM.    --- End of Report ---          < end of copied text >   Chart reviewed, patient examined. Pertinent results reviewed.  Case discussed with HO; specialist f/u reviewed  HD#3; in ICU intubated-D3  MARCELO BASSETT    HPI:  73M w/ PMHx of Afib on Coumadin, HTN, HLD, CVA 17yrs ago w/ residual R sided weakness (ambulates w/ cane at baseline, requires assistance w/ ADLs, lives with his sister who is his primary caretaker) BIBEMS to [facility] ED after pt's sister found him unresponsive. Pt's sister states they were eating dinner, pt at around 8pm on DOA & pt went to use bathroom. Sister heard thud from bathroom, attempted to open door but pt had fallen in front of door and she was unable to reach him. Sister called EMS, who were able to open door and found pt unresponsive on the floor with sonorous respirations. GCS 3 on arrival, pt noted to have equal/reactive pupils. Intubated for airway protection. Had CTH showing large R basal ganglia IPH w/ SOHAM, surrounding vasogenic edema, and 1.3cm leftward MLS. SBP 160s, pt started on Nicardipine gtt. INR 2.81, pt given Kcentra and Vitamin K for reversal of Coumadin. Hyperosmolar therapy administered Pt transferred to [facility]. He remains intubated. His family elected no aggressive surgery, and signed DNR.      Admission scores:  GCS 3  ICH 4  nihss 23 (02 Dec 2023 01:48)    INTERVAL EVENTS: Patient seen today, chart reviewed.  Still intubated in NICU    MEDICATIONS  (STANDING):  amLODIPine   Tablet 5 milliGRAM(s) Oral daily  chlorhexidine 0.12% Liquid 15 milliLiter(s) Oral Mucosa every 12 hours  chlorhexidine 2% Cloths 1 Application(s) Topical <User Schedule>  dexMEDEtomidine Infusion 0.493 MICROgram(s)/kG/Hr (12.2 mL/Hr) IV Continuous <Continuous>  dextrose 5%. 1000 milliLiter(s) (100 mL/Hr) IV Continuous <Continuous>  dextrose 5%. 1000 milliLiter(s) (50 mL/Hr) IV Continuous <Continuous>  dextrose 50% Injectable 25 Gram(s) IV Push once  dextrose 50% Injectable 25 Gram(s) IV Push once  dextrose 50% Injectable 12.5 Gram(s) IV Push once  famotidine    Tablet 20 milliGRAM(s) Oral daily  furosemide    Tablet 40 milliGRAM(s) Oral daily  glucagon  Injectable 1 milliGRAM(s) IntraMuscular once  insulin lispro (ADMELOG) corrective regimen sliding scale   SubCutaneous every 6 hours  niCARdipine Infusion 5 mG/Hr (25 mL/Hr) IV Continuous <Continuous>  phytonadione  IVPB 10 milliGRAM(s) IV Intermittent daily  polyethylene glycol 3350 17 Gram(s) Oral every 12 hours  senna 2 Tablet(s) Oral at bedtime    MEDICATIONS  (PRN):  dextrose Oral Gel 15 Gram(s) Oral once PRN Blood Glucose LESS THAN 70 milliGRAM(s)/deciliter  fentaNYL    Injectable 25 MICROGram(s) IV Push every 2 hours PRN CPOT 4-5  fentaNYL    Injectable 50 MICROGram(s) IV Push every 2 hours PRN CPOT 6-8  labetalol Injectable 10 milliGRAM(s) IV Push every 2 hours PRN Systolic blood pressure > 140      Vital Signs Last 24 Hrs  T(C): 37.4 (04 Dec 2023 08:00), Max: 37.5 (04 Dec 2023 00:00)  T(F): 99.4 (04 Dec 2023 08:00), Max: 99.5 (04 Dec 2023 00:00)  HR: 102 (04 Dec 2023 08:45) (64 - 119)  BP: 157/70 (04 Dec 2023 08:30) (123/56 - 186/77)  BP(mean): 100 (04 Dec 2023 08:30) (81 - 114)  RR: 32 (04 Dec 2023 08:45) (24 - 37)  SpO2: 95% (04 Dec 2023 08:45) (92% - 100%)    Parameters below as of 04 Dec 2023 08:00  Patient On (Oxygen Delivery Method): ventilator    O2 Concentration (%): 40 on vent- 20/450/5/40  PHYSICAL EXAM:  GENERAL: Patient vented, sedated, unresponsive, bruising to the right forehead  NECK: Supple, No JVD,  CHEST/LUNG: Transmittable sounds  HEART: IRR IRR  ABDOMEN: Soft, Nontender, Nondistended; Bowel sounds present; obese  EXTREMITIES: Chronic edema, R > L      LABS:                          11.3   11.50 )-----------( 202      ( 04 Dec 2023 05:05 )             36.6                           10.6   16.29 )-----------( 169      ( 02 Dec 2023 04:00 )             33.2     12-04    152<H>  |  120<H>  |  48<H>  ----------------------------<  136<H>  4.0   |  22  |  2.1<H>    Ca    8.9      04 Dec 2023 05:05  Phos  1.0     12-04  Mg     2.3     12-04    TPro  5.6<L>  /  Alb  3.0<L>  /  TBili  1.2  /  DBili  x   /  AST  13  /  ALT  13  /  AlkPhos  108  12-04              12-02    138  |  106  |  29<H>  ----------------------------<  234<H>  3.3<L>   |  17  |  1.6<H>    Ca    7.1<L>      02 Dec 2023 04:00  Phos  2.8     12-02  Mg     1.7     12-02    TPro  5.7<L>  /  Alb  3.2<L>  /  TBili  1.0  /  DBili  x   /  AST  17  /  ALT  17  /  AlkPhos  116<H>  12-02    LIVER FUNCTIONS - ( 02 Dec 2023 04:00 )  Alb: 3.2 g/dL / Pro: 5.7 g/dL / ALK PHOS: 116 U/L / ALT: 17 U/L / AST: 17 U/L / GGT: x                   PT/INR - ( 02 Dec 2023 09:55 )   PT: 15.50 sec;   INR: 1.36 ratio         PTT - ( 02 Dec 2023 09:55 )  PTT:30.5 sec  CARDIAC MARKERS ( 01 Dec 2023 21:40 )  x     / <0.01 ng/mL / x     / x     / x          ABG - ( 02 Dec 2023 09:54 )  pH, Arterial: 7.37  pH, Blood: x     /  pCO2: 41    /  pO2: 169   / HCO3: 24    / Base Excess: -1.5  /  SaO2: 99.6          Urinalysis Basic - ( 02 Dec 2023 04:00 )    Color: x / Appearance: x / SG: x / pH: x  Gluc: 234 mg/dL / Ketone: x  / Bili: x / Urobili: x   Blood: x / Protein: x / Nitrite: x   Leuk Esterase: x / RBC: x / WBC x   Sq Epi: x / Non Sq Epi: x / Bacteria: x        < from: 12 Lead ECG (12.01.23 @ 22:05) >    Ventricular Rate 70 BPM    Atrial Rate 340 BPM    QRS Duration 120 ms    Q-T Interval 382 ms    QTC Calculation(Bazett) 412 ms    R Axis 58 degrees    T Axis 102 degrees    Diagnosis Line    Atrial fibrillation  Left ventricular hypertrophy with QRS widening  ST & T wave abnormality, consider lateral ischemia or digitalis effect  Abnormal ECG    < end of copied text >      RADIOLOGY & ADDITIONAL TESTS:  < from: Xray Chest 1 View- PORTABLE-Urgent (Xray Chest 1 View- PORTABLE-Urgent .) (12.02.23 @ 06:57) >    PROCEDURE DATE:  12/02/2023          INTERPRETATION:  Clinical History / Reason for exam: Follow-up.    Comparison : Chest radiograph December 1, 2023.    Technique/Positioning: Low lung volume.    Findings:    Support devices: ETT with its tip above the anshul and NGT with its tip   below the diaphragm.    Cardiac/mediastinum/hilum: Stable    Lung parenchyma/Pleura: Within normal limits.    Skeleton/soft tissues: Stable    Impression:    Low lung volume.    No acute infiltrates.    Support tubes and lines as above.    --- End of Report ---    < end of copied text >  < from: CT Head No Cont (12.02.23 @ 05:04) >    INTERPRETATION:  CLINICAL HISTORY: Intracranial hemorrhage, follow-up.    COMPARISON: CT head 12/1/2023.    TECHNIQUE: Contiguous unenhanced CT axial images of the head with coronal   and sagittal reformats without the administration of IV contrast.    FINDINGS:    There is increased size of large right basal ganglia intraparenchymal   hemorrhage measuring approximately 10.6 x 7.9 cm, previously 9.3 x 6.6 cm   (measured on 2/20). There is increased intraventricular hemorrhage   predominantly layering within the posterior horn lateral ventricles,   temporal horns, third, and fourth ventricles. There is worsening   subfalcine herniation, central herniation as well as uncal herniation.    There is new intraparenchymal hemorrhage in the left frontal and parietal   lobes.    There is prominence of the sulci, sylvian fissures, and ventricles likely   reflecting mild diffuse parenchymal volume loss.    There are scattered patchy low attenuations in bilateral periventricular   cerebral white matter consistent with severe chronic microvascular   ischemic changes. Stable left frontoparietal and occipital gliosis and   encephalomalacia from chronic infarct.    No acute osseous abnormality or depressed skull fracture. Impacted right   molar tooth. The mastoid air cells are aerated bilaterally. Mild ethmoid   and maxillary sinus mucosal thickening. The visualized orbits are   unremarkable.    IMPRESSION:  Since the recent prior head CT there is increased large acute hemorrhage   centered within the right basal ganglia with intraventricular extension   as well as new hemorrhage within the left frontal and parietal lobes.    Worsening trapping of the left lateral ventricle as well as subfalcine,   central as well as uncal herniation.    These findings were discussed with KENNEY ALVA [phone] at 12/2/2023   5:47 AM by Dr. Ley with read back confirmation.    --- End of Report ---    < end of copied text >  < from: CT Brain Stroke Protocol (12.01.23 @ 21:51) >    INTERPRETATION:  CLINICAL HISTORY: Code stroke.    COMPARISON: CT head 7/17/2022.    TECHNIQUE: Contiguous unenhanced CT axial images of the head with coronal   and sagittal reformats without the administration of IV contrast.    FINDINGS:    Large right basal ganglia intraparenchymal hemorrhage with surrounding   vasogenic edema and extension into the posterior horn lateral ventricles,   the aqueduct of Sylvius and the 4th ventricle. There is 1.3 cm leftward   midline shift.    There is prominence of the sulci, sylvian fissures, and ventricles likely   reflecting mild diffuse parenchymal volume loss. Redemonstration of   extensive left frontal, parietal, and occipital gliosis and   encephalomalacia compatible with chronic infarct.    There are scattered patchy low attenuations in bilateral periventricular   cerebral white matter consistent with moderate-severe chronic   microvascular ischemic changes.    There is no diffuse ventriculomegaly.    No acute osseous abnormality or depressed skull fracture. Impacted right   molar tooth. The mastoid air cells are aerated bilaterally. Moderate   ethmoid mucosal thickening. Mildfrontal and maxillary mucosal   thickening. The visualized orbits are unremarkable.    IMPRESSION:    1.  Large right basal ganglia intraparenchymal hemorrhage with extension   into the posterior horn lateral ventricles, the aqueduct of Sylvius and   the 4th ventricle.  There is surrounding vasogenic edema. Etiology favors   a hypertensive hemorrhage.  2.  Leftward midline shift measures 1.3 cm.  3.  Moderate-severe chronic microvascular ischemic changes and chronic   infarct as above.    Preliminary findings were discussed with KENNEY Rodriguez at 12/1/2023 9:51 PM.    --- End of Report ---          < end of copied text >

## 2023-12-04 NOTE — DIETITIAN INITIAL EVALUATION ADULT - OTHER CALCULATIONS
Energy: 2213 kcal/day ([facility] 2003b equation Ve 8.6 & Tmax 24 hours 38.6 C) Energy: 2213 kcal/day ([facility] 2003b equation Ve 8.6 & Tmax 24 hours 38.6 C)

## 2023-12-04 NOTE — CONSULT NOTE ADULT - PROBLEM SELECTOR RECOMMENDATION 4
- will follow  ______________  Omer Morgan MD  Palliative Medicine  [facility]   of Geriatric and Palliative Medicine  [phone] - will follow  ______________  Omer Morgan MD  Palliative Medicine  [facility]   of Geriatric and Palliative Medicine  [phone]

## 2023-12-04 NOTE — OCCUPATIONAL THERAPY INITIAL EVALUATION ADULT - SPECIFY REASON(S)
Case discussed in Neurocrit IDT rounds, per team, pt not medically cleared to participate in OT today. Will hold and initiate OT if pt medically cleared.

## 2023-12-04 NOTE — DIETITIAN INITIAL EVALUATION ADULT - PERTINENT LABORATORY DATA
12-04    152<H>  |  120<H>  |  48<H>  ----------------------------<  136<H>  4.0   |  22  |  2.1<H>    Ca    8.9      04 Dec 2023 05:05  Phos  1.0     12-04  Mg     2.3     12-04    TPro  5.6<L>  /  Alb  3.0<L>  /  TBili  1.2  /  DBili  x   /  AST  13  /  ALT  13  /  AlkPhos  108  12-04  POCT Blood Glucose.: 115 mg/dL (12-04-23 @ 17:21)  A1C with Estimated Average Glucose Result: 5.6 % (12-03-23 @ 04:30)

## 2023-12-04 NOTE — CHART NOTE - NSCHARTNOTEFT_GEN_A_CORE
As per family discussion with patient primary RN, they do not wish for any further escalation of medical care given poor prognosis- no further blood draws. The patient remains DNR with palliative care following
Palliative Care consult received chart reviewed. Pt from home, large [facility]. Currently in NCCU on ventilator. Pt lives with his sister.     Full Code  Full consult to follow  Call x [phone] or message via teams for palliative care service

## 2023-12-04 NOTE — DIETITIAN INITIAL EVALUATION ADULT - PERTINENT MEDS FT
MEDICATIONS  (STANDING):  amLODIPine   Tablet 5 milliGRAM(s) Oral daily  chlorhexidine 0.12% Liquid 15 milliLiter(s) Oral Mucosa every 12 hours  chlorhexidine 2% Cloths 1 Application(s) Topical <User Schedule>  dexMEDEtomidine Infusion 0.493 MICROgram(s)/kG/Hr (12.2 mL/Hr) IV Continuous <Continuous>  dextrose 5%. 1000 milliLiter(s) (50 mL/Hr) IV Continuous <Continuous>  dextrose 5%. 1000 milliLiter(s) (100 mL/Hr) IV Continuous <Continuous>  dextrose 50% Injectable 25 Gram(s) IV Push once  dextrose 50% Injectable 25 Gram(s) IV Push once  dextrose 50% Injectable 12.5 Gram(s) IV Push once  famotidine    Tablet 20 milliGRAM(s) Oral daily  glucagon  Injectable 1 milliGRAM(s) IntraMuscular once  insulin lispro (ADMELOG) corrective regimen sliding scale   SubCutaneous every 6 hours  metolazone 5 milliGRAM(s) Oral daily  niCARdipine Infusion 5 mG/Hr (25 mL/Hr) IV Continuous <Continuous>  polyethylene glycol 3350 17 Gram(s) Oral every 12 hours  potassium phosphate / sodium phosphate Powder (PHOS-NaK) 1 Packet(s) Oral every 6 hours  senna 2 Tablet(s) Oral at bedtime    MEDICATIONS  (PRN):  dextrose Oral Gel 15 Gram(s) Oral once PRN Blood Glucose LESS THAN 70 milliGRAM(s)/deciliter  fentaNYL    Injectable 25 MICROGram(s) IV Push every 2 hours PRN CPOT 4-5  fentaNYL    Injectable 50 MICROGram(s) IV Push every 2 hours PRN CPOT 6-8  HYDROmorphone  Injectable 1 milliGRAM(s) IV Push every 4 hours PRN increased WOB  labetalol Injectable 10 milliGRAM(s) IV Push every 2 hours PRN Systolic blood pressure > 140

## 2023-12-04 NOTE — EEG REPORT - NS EEG TEXT BOX
Epilepsy Attending Note:     MARCELO BASSETT    73y Male  MRN [id number]    Vital Signs Last 24 Hrs  T(C): 38.6 (04 Dec 2023 12:00), Max: 38.6 (04 Dec 2023 12:00)  T(F): 101.4 (04 Dec 2023 12:00), Max: 101.4 (04 Dec 2023 12:00)  HR: 88 (04 Dec 2023 12:15) (64 - 119)  BP: 131/64 (04 Dec 2023 12:15) (117/58 - 186/77)  BP(mean): 90 (04 Dec 2023 12:15) (81 - 111)  RR: 35 (04 Dec 2023 12:15) (24 - 36)  SpO2: 94% (04 Dec 2023 12:15) (92% - 100%)    Parameters below as of 04 Dec 2023 12:00  Patient On (Oxygen Delivery Method): ventilator    O2 Concentration (%): 40                          11.3   11.50 )-----------( 202      ( 04 Dec 2023 05:05 )             36.6       12-04    152<H>  |  120<H>  |  48<H>  ----------------------------<  136<H>  4.0   |  22  |  2.1<H>    Ca    8.9      04 Dec 2023 05:05  Phos  1.0     12-04  Mg     2.3     12-04    TPro  5.6<L>  /  Alb  3.0<L>  /  TBili  1.2  /  DBili  x   /  AST  13  /  ALT  13  /  AlkPhos  108  12-04      MEDICATIONS  (STANDING):  amLODIPine   Tablet 5 milliGRAM(s) Oral daily  chlorhexidine 0.12% Liquid 15 milliLiter(s) Oral Mucosa every 12 hours  chlorhexidine 2% Cloths 1 Application(s) Topical <User Schedule>  dexMEDEtomidine Infusion 0.493 MICROgram(s)/kG/Hr (12.2 mL/Hr) IV Continuous <Continuous>  dextrose 5%. 1000 milliLiter(s) (50 mL/Hr) IV Continuous <Continuous>  dextrose 5%. 1000 milliLiter(s) (100 mL/Hr) IV Continuous <Continuous>  dextrose 50% Injectable 25 Gram(s) IV Push once  dextrose 50% Injectable 12.5 Gram(s) IV Push once  dextrose 50% Injectable 25 Gram(s) IV Push once  famotidine    Tablet 20 milliGRAM(s) Oral daily  glucagon  Injectable 1 milliGRAM(s) IntraMuscular once  insulin lispro (ADMELOG) corrective regimen sliding scale   SubCutaneous every 6 hours  metolazone 5 milliGRAM(s) Oral daily  niCARdipine Infusion 5 mG/Hr (25 mL/Hr) IV Continuous <Continuous>  polyethylene glycol 3350 17 Gram(s) Oral every 12 hours  potassium phosphate / sodium phosphate Powder (PHOS-NaK) 1 Packet(s) Oral every 6 hours  senna 2 Tablet(s) Oral at bedtime    MEDICATIONS  (PRN):  dextrose Oral Gel 15 Gram(s) Oral once PRN Blood Glucose LESS THAN 70 milliGRAM(s)/deciliter  fentaNYL    Injectable 25 MICROGram(s) IV Push every 2 hours PRN CPOT 4-5  fentaNYL    Injectable 50 MICROGram(s) IV Push every 2 hours PRN CPOT 6-8  HYDROmorphone  Injectable 0.5 milliGRAM(s) IV Push every 4 hours PRN tachynea/discomfort/vent dyssynchrony  labetalol Injectable 10 milliGRAM(s) IV Push every 2 hours PRN Systolic blood pressure > 140            VEEG in the last 24 hours:    Background----continues, asymmetrical with higher amplitude on the right and suppression on the right reaching 5-6 hz    Focal and generalized slowing----------- moderate generalized slowing. 2- moderate right hemispheric and mild to moderate independent left FT slolwing    Interictal activity----------  1- left FT sharp and sharp transients . 2- independent right parasaggital sharp and spike waves    Events-------------- none    Seizures----------none    Impression:  abnormal as above    Plan - as/ NCC team     Epilepsy Attending Note:     MARCELO BASSETT    73y Male  MRN [id number]    Vital Signs Last 24 Hrs  T(C): 38.6 (04 Dec 2023 12:00), Max: 38.6 (04 Dec 2023 12:00)  T(F): 101.4 (04 Dec 2023 12:00), Max: 101.4 (04 Dec 2023 12:00)  HR: 88 (04 Dec 2023 12:15) (64 - 119)  BP: 131/64 (04 Dec 2023 12:15) (117/58 - 186/77)  BP(mean): 90 (04 Dec 2023 12:15) (81 - 111)  RR: 35 (04 Dec 2023 12:15) (24 - 36)  SpO2: 94% (04 Dec 2023 12:15) (92% - 100%)    Parameters below as of 04 Dec 2023 12:00  Patient On (Oxygen Delivery Method): ventilator    O2 Concentration (%): 40                          11.3   11.50 )-----------( 202      ( 04 Dec 2023 05:05 )             36.6       12-04    152<H>  |  120<H>  |  48<H>  ----------------------------<  136<H>  4.0   |  22  |  2.1<H>    Ca    8.9      04 Dec 2023 05:05  Phos  1.0     12-04  Mg     2.3     12-04    TPro  5.6<L>  /  Alb  3.0<L>  /  TBili  1.2  /  DBili  x   /  AST  13  /  ALT  13  /  AlkPhos  108  12-04      MEDICATIONS  (STANDING):  amLODIPine   Tablet 5 milliGRAM(s) Oral daily  chlorhexidine 0.12% Liquid 15 milliLiter(s) Oral Mucosa every 12 hours  chlorhexidine 2% Cloths 1 Application(s) Topical <User Schedule>  dexMEDEtomidine Infusion 0.493 MICROgram(s)/kG/Hr (12.2 mL/Hr) IV Continuous <Continuous>  dextrose 5%. 1000 milliLiter(s) (50 mL/Hr) IV Continuous <Continuous>  dextrose 5%. 1000 milliLiter(s) (100 mL/Hr) IV Continuous <Continuous>  dextrose 50% Injectable 25 Gram(s) IV Push once  dextrose 50% Injectable 12.5 Gram(s) IV Push once  dextrose 50% Injectable 25 Gram(s) IV Push once  famotidine    Tablet 20 milliGRAM(s) Oral daily  glucagon  Injectable 1 milliGRAM(s) IntraMuscular once  insulin lispro (ADMELOG) corrective regimen sliding scale   SubCutaneous every 6 hours  metolazone 5 milliGRAM(s) Oral daily  niCARdipine Infusion 5 mG/Hr (25 mL/Hr) IV Continuous <Continuous>  polyethylene glycol 3350 17 Gram(s) Oral every 12 hours  potassium phosphate / sodium phosphate Powder (PHOS-NaK) 1 Packet(s) Oral every 6 hours  senna 2 Tablet(s) Oral at bedtime    MEDICATIONS  (PRN):  dextrose Oral Gel 15 Gram(s) Oral once PRN Blood Glucose LESS THAN 70 milliGRAM(s)/deciliter  fentaNYL    Injectable 25 MICROGram(s) IV Push every 2 hours PRN CPOT 4-5  fentaNYL    Injectable 50 MICROGram(s) IV Push every 2 hours PRN CPOT 6-8  HYDROmorphone  Injectable 0.5 milliGRAM(s) IV Push every 4 hours PRN tachynea/discomfort/vent dyssynchrony  labetalol Injectable 10 milliGRAM(s) IV Push every 2 hours PRN Systolic blood pressure > 140            VEEG in the last 24 hours:    Background----continues, asymmetrical with higher amplitude on the right and suppression on the right reaching 5-6 hz    Focal and generalized slowing----------- moderate generalized slowing. 2- moderate right hemispheric and mild to moderate independent left FT slolwing    Interictal activity----------  1- left FT sharp and sharp transients . 2- independent right parasaggital sharp and spike waves    Events-------------- none    Seizures----------none    Impression:  abnormal as above    Plan - as/ NCC team

## 2023-12-04 NOTE — CONSULT NOTE ADULT - PROBLEM SELECTOR RECOMMENDATION 3
- discussed with patient's sister at bedside, who is his primary caregiver  - she states patient would not want to live at this quality of life, and agreed to DNR earlier  - she opted for no escalation of care today, including no blood work or diagnostic imaging  - she is planning for transition to [facility] later this week and palliative extubation  - d/w medical team, nursing

## 2023-12-04 NOTE — PROGRESS NOTE ADULT - ASSESSMENT
73M with PMHx of Afib on Coumadin, HTN, HLD, CVA 17years ago w/ residual R sided weakness and expressive aphasia. Patient presented to the ED after collapsing in the bathroom at home. 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     73M with PMHx of Afib on Coumadin, HTN, HLD, CVA 17years ago w/ residual R sided weakness and expressive aphasia. Patient presented to the ED after collapsing in the bathroom at home. Patient was found with large R basal ganglia ICH w/ SOHAM  ICH 4    ICH  c/b brain herniation, IVH, hydrocephalus  Hypertensive Emergency,   Acute Respiratory Failure due ICH  - Patient vented  - sister signed DNR in the ER  - BP control as per neuro    Afib anticoagulated on Coumadin prior to adm  - rate controlled  - anticoagulation reversed in ED  - patient known to Dr Mccall    Hx of prior CVA with aphasia and right sided weakness  - patient was ambulatory with quad cane, WC used for distance  HLD    JOHN, on CKD 3  - trend labs    Attempted to call sister, failed  Continue supportive measures, will follow  Ask Palliative care to see patient.- their D/W sister noted.   possible liberation over next few days.  Discussion of possible organ donation would be reasonable.  Contact donation team if moving towards liberation.

## 2023-12-04 NOTE — DIETITIAN INITIAL EVALUATION ADULT - OTHER INFO
Pertinent Medical Information: Pt BIBEMS to [facility] ED after pt's sister found him unresponsive. GCS 3 on arrival, pt noted to have equal/reactive pupils. Intubated for airway protection. Had CTH showing large R basal ganglia IPH w/ SOHAM, surrounding vasogenic edema, and 1.3cm leftward MLS. SBP 160s, pt started on Nicardipine gtt. INR 2.81, pt given Kcentra and Vitamin K for reversal of Coumadin. Hyperosmolar therapy administered Pt transferred to [facility]. He remains intubated at this time. ICH c/b brain herniation, IVH, hydrocephalus. JOHN on CKD noted.    PMH includes Afib on Coumadin, HTN, HLD, CVA 17yrs ago w/ residual R sided weakness (ambulates w/ cane at baseline, requires assistance w/ ADLs. Pertinent Medical Information: Pt BIBEMS to [facility] [place] ED after pt's sister found him unresponsive. GCS 3 on arrival, pt noted to have equal/reactive pupils. Intubated for airway protection. Had CTH showing large R basal ganglia IPH w/ SOHAM, surrounding vasogenic edema, and 1.3cm leftward MLS. SBP 160s, pt started on Nicardipine gtt. INR 2.81, pt given Kcentra and Vitamin K for reversal of Coumadin. Hyperosmolar therapy administered Pt transferred to [facility]. He remains intubated at this time. ICH c/b brain herniation, IVH, hydrocephalus. JOHN on CKD noted.    PMH includes Afib on Coumadin, HTN, HLD, CVA 17yrs ago w/ residual R sided weakness (ambulates w/ cane at baseline, requires assistance w/ ADLs.

## 2023-12-04 NOTE — PROGRESS NOTE ADULT - ASSESSMENT
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  ICH 4    sICH likely due to hypertensive emergency, c/b brain herniation, IVH, hydrocephalus  acute respiratory failure due ICH  afib  lactic acidosis  JOHN      NEURO:  q2h coma checks  cth from 12/3 - No significant minimal change in the extensive irregular parenchymal   hemorrhage within the right cerebral hemisphere/basal ganglia, the   intraventricular hemorrhage, and the left frontal/parietal   periventricular parenchymal hemorrhage.  2.  Similar mass effect with right-to-left midline shift measuring 1.6 cm   and right uncal herniation.  3.  Similar compression of the right lateral ventricle and   enlarged/entrapped left lateral ventricle.  4.  Small scattered subarachnoid hemorrhage, new.  Evaluated by NSGY- pt's sister declining surgical interventions  Na goal 145-155  patient with poor neurologic functional outcome expected with optimal medical manage and with or without neurosurgical intervention   vEEG  Sedation- precedex  liveOn consult  Activity: [] OOB as tolerated [x] Bedrest [] PT [] OT [] PMNR    PULM:  respiratory failure with hypoxic failure from hypervolemia and likely pulmonary HTN decompensation   Lung protective vent settings  SAT/SBT as tolerates  HOB>30    CV:  -140  Nicardipine gtt, Labetalol PRN  at home pt on norvasc, toprol, enalapril, digoxin, statin, coumadin, lasix  LDL 53  echo- lvef 45%, RV moderate reduced function, PASP 50 mmHg  coumadin held and reversed given ICH    RENAL:  JOHN on CKD likely related to contrast-induced nephropathy  pt hypervolemic with increasing oxygen requirements  administer lasix IV and metolazone 5 mg enterally  Crowe in place  goal Na 145-155    GI:  NPO, OGT in place, with tube feeds   GI prophylaxis [x] not indicated [] PPI [x] pepcid while on ventilator  Bowel regimen [] miralax [] senna [] other:  LBM 12/2    ENDO:   Goal -180  a1c- p  tsh- p    HEME/ONC:  VTE prophylaxis: [x] SCDs [] chemoprophylaxis [x] hold chemoprophylaxis due to: ICH (recent coumadin intake at home)  S/p Kcentra and Vitamin K for Coumadin reversal; c/w vit K  Repeat coags q 2 days     ID:  Maintain normothermia    MISC:    RIJ CVC 12/3  crowe 12/2  ETT 12/2    SOCIAL/FAMILY:    CODE STATUS:  [] Full Code [x] DNR [] DNI [x] Palliative consult  plan for family meeting    DISPOSITION:  [x] ICU [] Stroke Unit [] Floor [] CEU [] Tele

## 2023-12-04 NOTE — PHARMACOTHERAPY INTERVENTION NOTE - COMMENTS
furosemide 60mg IV daily, pt received 40mg via og ~6am, d/w NCC team, changed 40mg IV x1 now, then 60mg IV daily 12/5

## 2023-12-04 NOTE — CONSULT NOTE ADULT - ASSESSMENT
73M with PMH of AF on coumadin, HTN, HLD, CVA (residual R-sided weakness), initially brought to [facility] after being found unresponsive by sister, who he lives with and is his primary caretaker. CT head here showed large R basal ganglia IPH and vasogenic edema with midline shift, started on nicardipine, given kcentra and vitamin K (for coumain reversal), and transferred north. Patient is now vented, and DNR decided on by sister. Palliative care consulted for [facility].   73M with PMH of AF on coumadin, HTN, HLD, CVA (residual R-sided weakness), initially brought to [facility] after being found unresponsive by sister, who he lives with and is his primary caretaker. CT head here showed large R basal ganglia IPH and vasogenic edema with midline shift, started on nicardipine, given kcentra and vitamin K (for coumain reversal), and transferred north. Patient is now vented, and DNR decided on by sister. Palliative care consulted for [facility].

## 2023-12-04 NOTE — CONSULT NOTE ADULT - SUBJECTIVE AND OBJECTIVE BOX
HPI: 73M with PMH of AF on coumadin, HTN, HLD, CVA (residual R-sided weakness), initially brought to [facility] after being found unresponsive by sister, who he lives with and is his primary caretaker. CT head here showed large R basal ganglia IPH and vasogenic edema with midline shift, started on nicardipine, given kcentra and vitamin K (for coumain reversal), and transferred north. Patient is now vented, and DNR decided on by sister. Palliative care consulted for GOC.    PERTINENT PM/SXH:   TIA (transient ischemic attack)    Sciatica    HTN (hypertension)    Afib    Kidney stone    Right sided weakness    CVA (cerebrovascular accident)      No significant past surgical history      FAMILY HISTORY:  no pertinent family history      SOCIAL HISTORY:   Significant other/partner[ ]  Children[ ]  Oriental orthodox/Spirituality:  Substance hx:  [ ]   Tobacco hx:  [ ]   Alcohol hx: [ ]   Living Situation: [ ]Home  [ ]Long term care  [ ]Rehab [ ]Other  Home Services: [ ] HHA [ ] Visting RN [ ] Hospice  Occupation:  Home Opioid hx:  [ ] Y [ ] N [ ] I-Stop Reference No:    ADVANCE DIRECTIVES:    [ ] Full Code [ ] DNR  MOLST  [ ]  Living Will  [ ]   DECISION MAKER(s):  [ ] Health Care Proxy(s)  [ ] Surrogate(s)  [ ] Guardian           Name(s): Phone Number(s):    BASELINE (I)ADL(s) (prior to admission):  [place]: [ ]Total  [ ] Moderate [ ]Dependent  Palliative Performance Status Version 2:         %    http://npcrc.org/files/news/palliative_performance_scale_[email or web address].pdf    Allergies    No Known Allergies    Intolerances    MEDICATIONS  (STANDING):  amLODIPine   Tablet 5 milliGRAM(s) Oral daily  chlorhexidine 0.12% Liquid 15 milliLiter(s) Oral Mucosa every 12 hours  chlorhexidine 2% Cloths 1 Application(s) Topical <User Schedule>  dexMEDEtomidine Infusion 0.493 MICROgram(s)/kG/Hr (12.2 mL/Hr) IV Continuous <Continuous>  dextrose 5%. 1000 milliLiter(s) (50 mL/Hr) IV Continuous <Continuous>  dextrose 5%. 1000 milliLiter(s) (100 mL/Hr) IV Continuous <Continuous>  dextrose 50% Injectable 25 Gram(s) IV Push once  dextrose 50% Injectable 25 Gram(s) IV Push once  dextrose 50% Injectable 12.5 Gram(s) IV Push once  famotidine    Tablet 20 milliGRAM(s) Oral daily  furosemide    Tablet 40 milliGRAM(s) Oral daily  glucagon  Injectable 1 milliGRAM(s) IntraMuscular once  insulin lispro (ADMELOG) corrective regimen sliding scale   SubCutaneous every 6 hours  niCARdipine Infusion 5 mG/Hr (25 mL/Hr) IV Continuous <Continuous>  phytonadione  IVPB 10 milliGRAM(s) IV Intermittent daily  polyethylene glycol 3350 17 Gram(s) Oral every 12 hours  senna 2 Tablet(s) Oral at bedtime    MEDICATIONS  (PRN):  dextrose Oral Gel 15 Gram(s) Oral once PRN Blood Glucose LESS THAN 70 milliGRAM(s)/deciliter  fentaNYL    Injectable 50 MICROGram(s) IV Push every 2 hours PRN CPOT 6-8  fentaNYL    Injectable 25 MICROGram(s) IV Push every 2 hours PRN CPOT 4-5  labetalol Injectable 10 milliGRAM(s) IV Push every 2 hours PRN Systolic blood pressure > 140      PRESENT SYMPTOMS: [ ]Unable to obtain due to poor mentation   Source if other than patient:  [ ]Family   [ ]Team     Pain: [ ]yes [ ]no  QOL impact -   Location -                    Aggravating factors -  Quality -  Radiation -  Timing-  Severity (0-10 scale):  Minimal acceptable level (0-10 scale):     CPOT:    https://www.sccm.org/getattachment/[email or web address]/Critical-Care-Pain-Observation-Tool-(CPOT)    PAIN AD Score:   [email or web address]://geriatrictoolkit.[email or web address].[email or web address]/cog/painad.pdf (press ctrl +  left click to view)    Dyspnea:                           [ ]None[ ]Mild [ ]Moderate [ ]Severe     Respiratory Distress Observation Scale (RDOS):   A score of 0 to 2 signifies little or no respiratory distress, 3 signifies mild distress, scores 4 to 6 indicate moderate distress, and scores greater than 7 signify severe distress  [email or web address]://www.[email or web address].ca/sites/default/files/PDFS/[email or web address]-[email or web address]-[email or web address].pdf    Anxiety:                             [ ]None[ ]Mild [ ]Moderate [ ]Severe   Fatigue:                             [ ]None[ ]Mild [ ]Moderate [ ]Severe   Nausea:                             [ ]None[ ]Mild [ ]Moderate [ ]Severe   Loss of appetite:              [ ]None[ ]Mild [ ]Moderate [ ]Severe   Constipation:                    [ ]None[ ]Mild [ ]Moderate [ ]Severe    Other Symptoms:  [ ]All other review of systems negative     Palliative Performance Status Version 2:         %    [email or web address]://[email or web address]/news/palliative_performance_scale_[email or web address].pdf  PHYSICAL EXAM:  Vital Signs Last 24 Hrs  T(C): 37.4 (04 Dec 2023 08:00), Max: 37.5 (04 Dec 2023 00:00)  T(F): 99.4 (04 Dec 2023 08:00), Max: 99.5 (04 Dec 2023 00:00)  HR: 85 (04 Dec 2023 09:00) (64 - 119)  BP: 136/66 (04 Dec 2023 09:00) (123/56 - 186/77)  BP(mean): 90 (04 Dec 2023 09:00) (81 - 112)  RR: 31 (04 Dec 2023 09:00) (24 - 37)  SpO2: 93% (04 Dec 2023 09:00) (92% - 100%)    Parameters below as of 04 Dec 2023 08:00  Patient On (Oxygen Delivery Method): ventilator    O2 Concentration (%): 40 I&O's Summary    03 Dec 2023 07:01  -  04 Dec 2023 07:00  --------------------------------------------------------  IN: 3572.6 mL / OUT: 1055 mL / NET: 2517.6 mL    04 Dec 2023 07:01  -  04 Dec 2023 09:58  --------------------------------------------------------  IN: 409.8 mL / OUT: 60 mL / NET: 349.8 mL        GENERAL:  [X ] No acute distress [ ]Lethargic  [ ]Unarousable  [ ]Verbal  [ ]Non-Verbal [ ]Cachexia    BEHAVIORAL/PSYCH:  [ ]Alert and Oriented x  [ ] Anxiety [ ] Delirium [ ] Agitation [X ] Calm   EYES: [ ] No scleral icterus [ ] Scleral icterus [ ] Closed  ENMT:  [ ]Dry mouth  [ ]No external oral lesions [ ] No external ear or nose lesions  CARDIOVASCULAR:  [ ]Regular [ ]Irregular [ ]Tachy [ ]Not Tachy  [ ]Vincent [ ] Edema [ ] No edema  PULMONARY:  [ ]Tachypnea  [ ]Audible excessive secretions [X ] No labored breathing [ ] labored breathing  GASTROINTESTINAL: [ ]Soft  [ ]Distended  [ X]Not distended [ ]Non tender [ ]Tender  MUSCULOSKELETAL: [ ]No clubbing [ ] clubbing  [ ] No cyanosis [ ] cyanosis  NEUROLOGIC: [ ]No focal deficits  [ ]Follows commands  [ ]Does not follow commands  [ ]Cognitive impairment  [ ]Dysphagia  [ ]Dysarthria  [ ]Paresis   SKIN: [ ] Jaundiced [X ] Non-jaundiced [ ]Rash [ ]No Rash [ ] Warm [ ] Dry  MISC/LINES: [ ] ET tube [ ] Trach [ ]NGT/OGT [ ]PEG [ ]Epperson    CRITICAL CARE:  [ ] Shock Present  [ ]Septic [ ]Cardiogenic [ ]Neurologic [ ]Hypovolemic  [ ]  Vasopressors [ ]  Inotropes   [ ]Respiratory failure present [ ]Mechanical ventilation [ ]Non-invasive ventilatory support [ ]High flow  [ ]Acute  [ ]Chronic [ ]Hypoxic  [ ]Hypercarbic [ ]Other  [ ]Other organ failure     LABS: reviewed by me                        11.3   11.50 )-----------( 202      ( 04 Dec 2023 05:05 )             36.6   12-04    152<H>  |  120<H>  |  48<H>  ----------------------------<  136<H>  4.0   |  22  |  2.1<H>    Ca    8.9      04 Dec 2023 05:05  Phos  1.0     12-04  Mg     2.3     12-04    TPro  5.6<L>  /  Alb  3.0<L>  /  TBili  1.2  /  DBili  x   /  AST  13  /  ALT  13  /  AlkPhos  108  12-04  PT/INR - ( 04 Dec 2023 05:05 )   PT: 13.30 sec;   INR: 1.16 ratio         PTT - ( 04 Dec 2023 05:05 )  PTT:26.9 sec    Urinalysis Basic - ( 04 Dec 2023 05:05 )    Color: x / Appearance: x / SG: x / pH: x  Gluc: 136 mg/dL / Ketone: x  / Bili: x / Urobili: x   Blood: x / Protein: x / Nitrite: x   Leuk Esterase: x / RBC: x / WBC x   Sq Epi: x / Non Sq Epi: x / Bacteria: x      RADIOLOGY & ADDITIONAL STUDIES: reviewed by me    CT head 10/3/2023    1.  No significant minimal change in the extensive irregular parenchymal   hemorrhage within the right cerebral hemisphere/basal ganglia, the   intraventricular hemorrhage, and the left frontal/parietal   periventricular parenchymal hemorrhage.    2.  Similar mass effect with right-to-left midline shift measuring 1.6 cm   and right uncal herniation.    3.  Similar compression of the right lateral ventricle and   enlarged/entrapped left lateral ventricle.    4.  Small scattered subarachnoid hemorrhage, new.    PROTEIN CALORIE MALNUTRITION PRESENT: [ ]mild [ ]moderate [ ]severe [ ]underweight [ ]morbid obesity  https://www.andeal.org/vault/2440/web/files/ONC/Table_Clinical%20Characteristics%20to%20Document%20Malnutrition-White%20JV%20et%20al%202012.pdf    Height (cm): 182.9 (12-02-23 @ 05:13)  Weight (kg): 99 (12-02-23 @ 05:13)  BMI (kg/m2): 29.6 (12-02-23 @ 05:13)    [ ]PPSV2 < or = to 30% [ ]significant weight loss  [ ]poor nutritional intake  [ ]anasarca      [ ]Artificial Nutrition          Palliative Care Spiritual/Emotional Screening Tool Question  Severity (0-4):                    OR                    [X ] Unable to determine/NA  Score of 2 or greater indicates recommendation of Chaplaincy referral  Chaplaincy Referral: [ ] Yes [ ] Refused [ ] Following     Caregiver [place]:  [ ] Yes [ ] No    OR    [x ] Unable to determine. Will assess at later time if appropriate.  Social Work Referral [ ]  Patient and Family Centered Care Referral [ ]    Anticipatory Grief Present: [ ] Yes [ ] No    OR     [ x] Unable to determine. Will assess at later time if appropriate.  Social Work Referral [ ]  Patient and Family Centered Care Referral [ ]    REFERRALS:   [ ]Chaplaincy  [ ]Hospice  [ ]Child Life  [ ]Social Work  [ ]Case management [ ]Holistic Therapy     Palliative care education provided to patient and/or family    Goals of Care Document:     ______________  Omer Morgan MD  Palliative Medicine  [facility]   of Geriatric and Palliative Medicine  [phone]   HPI: 73M with PMH of AF on coumadin, HTN, HLD, CVA (residual R-sided weakness), initially brought to [facility] after being found unresponsive by sister, who he lives with and is his primary caretaker. CT head here showed large R basal ganglia IPH and vasogenic edema with midline shift, started on nicardipine, given kcentra and vitamin K (for coumain reversal), and transferred north. Patient is now vented, and DNR decided on by sister. Palliative care consulted for GOC.    PERTINENT PM/SXH:   TIA (transient ischemic attack)    Sciatica    HTN (hypertension)    Afib    Kidney stone    Right sided weakness    CVA (cerebrovascular accident)      No significant past surgical history      FAMILY HISTORY:  no pertinent family history      SOCIAL HISTORY:   Significant other/partner[ ]  Children[ ]  Voodoo/Spirituality:  Substance hx:  [ ]   Tobacco hx:  [ ]   Alcohol hx: [ ]   Living Situation: [ ]Home  [ ]Long term care  [ ]Rehab [ ]Other  Home Services: [ ] HHA [ ] Visting RN [ ] Hospice  Occupation:  Home Opioid hx:  [ ] Y [ ] N [ ] I-Stop Reference No:    ADVANCE DIRECTIVES:    [ ] Full Code [ ] DNR  MOLST  [ ]  Living Will  [ ]   DECISION MAKER(s):  [ ] Health Care Proxy(s)  [ ] Surrogate(s)  [ ] Guardian           Name(s): Phone Number(s):    BASELINE (I)ADL(s) (prior to admission):  [place]: [ ]Total  [ ] Moderate [ ]Dependent  Palliative Performance Status Version 2:         %    http://npcrc.org/files/news/palliative_performance_scale_ppsv2.pdf    Allergies    No Known Allergies    Intolerances    MEDICATIONS  (STANDING):  amLODIPine   Tablet 5 milliGRAM(s) Oral daily  chlorhexidine 0.12% Liquid 15 milliLiter(s) Oral Mucosa every 12 hours  chlorhexidine 2% Cloths 1 Application(s) Topical <User Schedule>  dexMEDEtomidine Infusion 0.493 MICROgram(s)/kG/Hr (12.2 mL/Hr) IV Continuous <Continuous>  dextrose 5%. 1000 milliLiter(s) (50 mL/Hr) IV Continuous <Continuous>  dextrose 5%. 1000 milliLiter(s) (100 mL/Hr) IV Continuous <Continuous>  dextrose 50% Injectable 25 Gram(s) IV Push once  dextrose 50% Injectable 25 Gram(s) IV Push once  dextrose 50% Injectable 12.5 Gram(s) IV Push once  famotidine    Tablet 20 milliGRAM(s) Oral daily  furosemide    Tablet 40 milliGRAM(s) Oral daily  glucagon  Injectable 1 milliGRAM(s) IntraMuscular once  insulin lispro (ADMELOG) corrective regimen sliding scale   SubCutaneous every 6 hours  niCARdipine Infusion 5 mG/Hr (25 mL/Hr) IV Continuous <Continuous>  phytonadione  IVPB 10 milliGRAM(s) IV Intermittent daily  polyethylene glycol 3350 17 Gram(s) Oral every 12 hours  senna 2 Tablet(s) Oral at bedtime    MEDICATIONS  (PRN):  dextrose Oral Gel 15 Gram(s) Oral once PRN Blood Glucose LESS THAN 70 milliGRAM(s)/deciliter  fentaNYL    Injectable 50 MICROGram(s) IV Push every 2 hours PRN CPOT 6-8  fentaNYL    Injectable 25 MICROGram(s) IV Push every 2 hours PRN CPOT 4-5  labetalol Injectable 10 milliGRAM(s) IV Push every 2 hours PRN Systolic blood pressure > 140      PRESENT SYMPTOMS: [ ]Unable to obtain due to poor mentation   Source if other than patient:  [ ]Family   [ ]Team     Pain: [ ]yes [ ]no  QOL impact -   Location -                    Aggravating factors -  Quality -  Radiation -  Timing-  Severity (0-10 scale):  Minimal acceptable level (0-10 scale):     CPOT:    https://www.sccm.org/getattachment/[email or web address]/Critical-Care-Pain-Observation-Tool-(CPOT)    PAIN AD Score:   [email or web address]://geriatrictoolkit.[place].[facility]/cog/painad.pdf (press ctrl +  left click to view)    Dyspnea:                           [ ]None[ ]Mild [ ]Moderate [ ]Severe     Respiratory Distress Observation Scale (RDOS):   A score of 0 to 2 signifies little or no respiratory distress, 3 signifies mild distress, scores 4 to 6 indicate moderate distress, and scores greater than 7 signify severe distress  [email or web address]://www.[email or web address]/sites/default/files/PDFS/[email or web address]-[email or web address].pdf    Anxiety:                             [ ]None[ ]Mild [ ]Moderate [ ]Severe   Fatigue:                             [ ]None[ ]Mild [ ]Moderate [ ]Severe   Nausea:                             [ ]None[ ]Mild [ ]Moderate [ ]Severe   Loss of appetite:              [ ]None[ ]Mild [ ]Moderate [ ]Severe   Constipation:                    [ ]None[ ]Mild [ ]Moderate [ ]Severe    Other Symptoms:  [ ]All other review of systems negative     Palliative Performance Status Version 2:         %    [email or web address]://[email or web address].org/files/news/palliative_performance_scale_[email or web address].pdf  PHYSICAL EXAM:  Vital Signs Last 24 Hrs  T(C): 37.4 (04 Dec 2023 08:00), Max: 37.5 (04 Dec 2023 00:00)  T(F): 99.4 (04 Dec 2023 08:00), Max: 99.5 (04 Dec 2023 00:00)  HR: 85 (04 Dec 2023 09:00) (64 - 119)  BP: 136/66 (04 Dec 2023 09:00) (123/56 - 186/77)  BP(mean): 90 (04 Dec 2023 09:00) (81 - 112)  RR: 31 (04 Dec 2023 09:00) (24 - 37)  SpO2: 93% (04 Dec 2023 09:00) (92% - 100%)    Parameters below as of 04 Dec 2023 08:00  Patient On (Oxygen Delivery Method): ventilator    O2 Concentration (%): 40 I&O's Summary    03 Dec 2023 07:01  -  04 Dec 2023 07:00  --------------------------------------------------------  IN: 3572.6 mL / OUT: 1055 mL / NET: 2517.6 mL    04 Dec 2023 07:01  -  04 Dec 2023 09:58  --------------------------------------------------------  IN: 409.8 mL / OUT: 60 mL / NET: 349.8 mL        GENERAL:  [X ] No acute distress [ ]Lethargic  [ ]Unarousable  [ ]Verbal  [ ]Non-Verbal [ ]Cachexia    BEHAVIORAL/PSYCH:  [ ]Alert and Oriented x  [ ] Anxiety [ ] Delirium [ ] Agitation [X ] Calm   EYES: [ ] No scleral icterus [ ] Scleral icterus [ ] Closed  ENMT:  [ ]Dry mouth  [ ]No external oral lesions [ ] No external ear or nose lesions  CARDIOVASCULAR:  [ ]Regular [ ]Irregular [ ]Tachy [ ]Not Tachy  [ ]Vincent [ ] Edema [ ] No edema  PULMONARY:  [ ]Tachypnea  [ ]Audible excessive secretions [X ] No labored breathing [ ] labored breathing  GASTROINTESTINAL: [ ]Soft  [ ]Distended  [ X]Not distended [ ]Non tender [ ]Tender  MUSCULOSKELETAL: [ ]No clubbing [ ] clubbing  [ ] No cyanosis [ ] cyanosis  NEUROLOGIC: [ ]No focal deficits  [ ]Follows commands  [ ]Does not follow commands  [ ]Cognitive impairment  [ ]Dysphagia  [ ]Dysarthria  [ ]Paresis   SKIN: [ ] Jaundiced [X ] Non-jaundiced [ ]Rash [ ]No Rash [ ] Warm [ ] Dry  MISC/LINES: [ ] ET tube [ ] Trach [ ]NGT/OGT [ ]PEG [ ]Epperson    CRITICAL CARE:  [ ] Shock Present  [ ]Septic [ ]Cardiogenic [ ]Neurologic [ ]Hypovolemic  [ ]  Vasopressors [ ]  Inotropes   [ ]Respiratory failure present [ ]Mechanical ventilation [ ]Non-invasive ventilatory support [ ]High flow  [ ]Acute  [ ]Chronic [ ]Hypoxic  [ ]Hypercarbic [ ]Other  [ ]Other organ failure     LABS: reviewed by me                        11.3   11.50 )-----------( 202      ( 04 Dec 2023 05:05 )             36.6   12-04    152<H>  |  120<H>  |  48<H>  ----------------------------<  136<H>  4.0   |  22  |  2.1<H>    Ca    8.9      04 Dec 2023 05:05  Phos  1.0     12-04  Mg     2.3     12-04    TPro  5.6<L>  /  Alb  3.0<L>  /  TBili  1.2  /  DBili  x   /  AST  13  /  ALT  13  /  AlkPhos  108  12-04  PT/INR - ( 04 Dec 2023 05:05 )   PT: 13.30 sec;   INR: 1.16 ratio         PTT - ( 04 Dec 2023 05:05 )  PTT:26.9 sec    Urinalysis Basic - ( 04 Dec 2023 05:05 )    Color: x / Appearance: x / SG: x / pH: x  Gluc: 136 mg/dL / Ketone: x  / Bili: x / Urobili: x   Blood: x / Protein: x / Nitrite: x   Leuk Esterase: x / RBC: x / WBC x   Sq Epi: x / Non Sq Epi: x / Bacteria: x      RADIOLOGY & ADDITIONAL STUDIES: reviewed by me    CT head 10/3/2023    1.  No significant minimal change in the extensive irregular parenchymal   hemorrhage within the right cerebral hemisphere/basal ganglia, the   intraventricular hemorrhage, and the left frontal/parietal   periventricular parenchymal hemorrhage.    2.  Similar mass effect with right-to-left midline shift measuring 1.6 cm   and right uncal herniation.    3.  Similar compression of the right lateral ventricle and   enlarged/entrapped left lateral ventricle.    4.  Small scattered subarachnoid hemorrhage, new.    PROTEIN CALORIE MALNUTRITION PRESENT: [ ]mild [ ]moderate [ ]severe [ ]underweight [ ]morbid obesity  https://www.andeal.org/vault/2440/web/files/ONC/Table_Clinical%20Characteristics%20to%20Document%20Malnutrition-White%20JV%20et%20al%202012.pdf    Height (cm): 182.9 (12-02-23 @ 05:13)  Weight (kg): 99 (12-02-23 @ 05:13)  BMI (kg/m2): 29.6 (12-02-23 @ 05:13)    [ ]PPSV2 < or = to 30% [ ]significant weight loss  [ ]poor nutritional intake  [ ]anasarca      [ ]Artificial Nutrition          Palliative Care Spiritual/Emotional Screening Tool Question  Severity (0-4):                    OR                    [X ] Unable to determine/NA  Score of 2 or greater indicates recommendation of Chaplaincy referral  Chaplaincy Referral: [ ] Yes [ ] Refused [ ] Following     Caregiver [place]:  [ ] Yes [ ] No    OR    [x ] Unable to determine. Will assess at later time if appropriate.  Social Work Referral [ ]  Patient and Family Centered Care Referral [ ]    Anticipatory Grief Present: [ ] Yes [ ] No    OR     [ x] Unable to determine. Will assess at later time if appropriate.  Social Work Referral [ ]  Patient and Family Centered Care Referral [ ]    REFERRALS:   [ ]Chaplaincy  [ ]Hospice  [ ]Child Life  [ ]Social Work  [ ]Case management [ ]Holistic Therapy     Palliative care education provided to patient and/or family    Goals of Care Document:     ______________  Omer Morgan MD  Palliative Medicine  [facility]   of Geriatric and Palliative Medicine  [phone]   HPI: 73M with PMH of AF on coumadin, HTN, HLD, CVA (residual R-sided weakness), initially brought to [facility] after being found unresponsive by sister, who he lives with and is his primary caretaker. CT head here showed large R basal ganglia IPH and vasogenic edema with midline shift, started on nicardipine, given kcentra and vitamin K (for coumain reversal), and transferred north. Patient is now vented, and DNR decided on by sister. Palliative care consulted for GOC.    PERTINENT PM/SXH:   TIA (transient ischemic attack)    Sciatica    HTN (hypertension)    Afib    Kidney stone    Right sided weakness    CVA (cerebrovascular accident)      No significant past surgical history      FAMILY HISTORY:  no pertinent family history      SOCIAL HISTORY:   Significant other/partner[ ]  Children[ ]  Faith/Spirituality:  Substance hx:  [ ]   Tobacco hx:  [ ]   Alcohol hx: [ ]   Living Situation: [X ]Home  [ ]Long term care  [ ]Rehab [ ]Other  Home Services: [ ] HHA [ ] Visting RN [ ] Hospice  Occupation:  Home Opioid hx:  [ ] Y [ ] N [ ] I-Stop Reference No:    ADVANCE DIRECTIVES:    [ ] Full Code [ ] DNR  MOLST  [ ]  Living Will  [ ]   DECISION MAKER(s):  [ ] Health Care Proxy(s)  [ ] Surrogate(s)  [ ] Guardian           Name(s): Phone Number(s): sister    BASELINE (I)ADL(s) (prior to admission):  [place]: [ ]Total  [ ] Moderate [ ]Dependent  Palliative Performance Status Version 2:         %    http://npcrc.org/files/news/palliative_performance_scale_ppsv2.pdf    Allergies    No Known Allergies    Intolerances    MEDICATIONS  (STANDING):  amLODIPine   Tablet 5 milliGRAM(s) Oral daily  chlorhexidine 0.12% Liquid 15 milliLiter(s) Oral Mucosa every 12 hours  chlorhexidine 2% Cloths 1 Application(s) Topical <User Schedule>  dexMEDEtomidine Infusion 0.493 MICROgram(s)/kG/Hr (12.2 mL/Hr) IV Continuous <Continuous>  dextrose 5%. 1000 milliLiter(s) (50 mL/Hr) IV Continuous <Continuous>  dextrose 5%. 1000 milliLiter(s) (100 mL/Hr) IV Continuous <Continuous>  dextrose 50% Injectable 25 Gram(s) IV Push once  dextrose 50% Injectable 25 Gram(s) IV Push once  dextrose 50% Injectable 12.5 Gram(s) IV Push once  famotidine    Tablet 20 milliGRAM(s) Oral daily  furosemide    Tablet 40 milliGRAM(s) Oral daily  glucagon  Injectable 1 milliGRAM(s) IntraMuscular once  insulin lispro (ADMELOG) corrective regimen sliding scale   SubCutaneous every 6 hours  niCARdipine Infusion 5 mG/Hr (25 mL/Hr) IV Continuous <Continuous>  phytonadione  IVPB 10 milliGRAM(s) IV Intermittent daily  polyethylene glycol 3350 17 Gram(s) Oral every 12 hours  senna 2 Tablet(s) Oral at bedtime    MEDICATIONS  (PRN):  dextrose Oral Gel 15 Gram(s) Oral once PRN Blood Glucose LESS THAN 70 milliGRAM(s)/deciliter  fentaNYL    Injectable 50 MICROGram(s) IV Push every 2 hours PRN CPOT 6-8  fentaNYL    Injectable 25 MICROGram(s) IV Push every 2 hours PRN CPOT 4-5  labetalol Injectable 10 milliGRAM(s) IV Push every 2 hours PRN Systolic blood pressure > 140      PRESENT SYMPTOMS: [ X]Unable to obtain due to poor mentation   Source if other than patient:  [ ]Family   [ ]Team     Pain: [ ]yes [ ]no  QOL impact -   Location -                    Aggravating factors -  Quality -  Radiation -  Timing-  Severity (0-10 scale):  Minimal acceptable level (0-10 scale):     CPOT:    https://www.sccm.org/getattachment/[email or web address]/Critical-Care-Pain-Observation-Tool-(CPOT)    PAIN AD Score: 0  http://geriatrictoolkit.[place].[facility]/cog/painad.pdf (press ctrl +  left click to view)    Dyspnea:                           [ ]None[ ]Mild [ ]Moderate [ ]Severe     Respiratory Distress Observation Scale (RDOS): 1  A score of 0 to 2 signifies little or no respiratory distress, 3 signifies mild distress, scores 4 to 6 indicate moderate distress, and scores greater than 7 signify severe distress  https://www.[facility].ca/sites/default/files/PDFS/[email or web address]-uxruiqbmpwe-rkzxxmxy-iqvvilaiuut-yitww.pdf    Anxiety:                             [ ]None[ ]Mild [ ]Moderate [ ]Severe   Fatigue:                             [ ]None[ ]Mild [ ]Moderate [ ]Severe   Nausea:                             [ ]None[ ]Mild [ ]Moderate [ ]Severe   Loss of appetite:              [ ]None[ ]Mild [ ]Moderate [ ]Severe   Constipation:                    [ ]None[ ]Mild [ ]Moderate [ ]Severe    Other Symptoms:  [ ]All other review of systems negative     Palliative Performance Status Version 2:         %    http://[facility].org/files/news/palliative_performance_scale_[email or web address].pdf  PHYSICAL EXAM:  Vital Signs Last 24 Hrs  T(C): 37.4 (04 Dec 2023 08:00), Max: 37.5 (04 Dec 2023 00:00)  T(F): 99.4 (04 Dec 2023 08:00), Max: 99.5 (04 Dec 2023 00:00)  HR: 85 (04 Dec 2023 09:00) (64 - 119)  BP: 136/66 (04 Dec 2023 09:00) (123/56 - 186/77)  BP(mean): 90 (04 Dec 2023 09:00) (81 - 112)  RR: 31 (04 Dec 2023 09:00) (24 - 37)  SpO2: 93% (04 Dec 2023 09:00) (92% - 100%)    Parameters below as of 04 Dec 2023 08:00  Patient On (Oxygen Delivery Method): ventilator    O2 Concentration (%): 40 I&O's Summary    03 Dec 2023 07:01  -  04 Dec 2023 07:00  --------------------------------------------------------  IN: 3572.6 mL / OUT: 1055 mL / NET: 2517.6 mL    04 Dec 2023 07:01  -  04 Dec 2023 09:58  --------------------------------------------------------  IN: 409.8 mL / OUT: 60 mL / NET: 349.8 mL        GENERAL:  [X ] No acute distress [ ]Lethargic  [ ]Unarousable  [ ]Verbal  [ ]Non-Verbal [ ]Cachexia    BEHAVIORAL/PSYCH:  [ ]Alert and Oriented x  [ ] Anxiety [ ] Delirium [ ] Agitation [X ] Calm   EYES: [ ] No scleral icterus [ ] Scleral icterus [X ] Closed  ENMT:  [ ]Dry mouth  [ ]No external oral lesions [X ] No external ear or nose lesions  CARDIOVASCULAR:  [ ]Regular [ ]Irregular [ ]Tachy [ ]Not Tachy  [ ]Vincent [ ] Edema [ ] No edema  PULMONARY:  [ ]Tachypnea  [ ]Audible excessive secretions [X ] No labored breathing [ ] labored breathing  GASTROINTESTINAL: [ ]Soft  [ ]Distended  [ X]Not distended [ ]Non tender [ ]Tender  MUSCULOSKELETAL: [ ]No clubbing [ ] clubbing  [ X] No cyanosis [ ] cyanosis  NEUROLOGIC: [ ]No focal deficits  [ ]Follows commands  [ ]Does not follow commands  [ X]Cognitive impairment  [ ]Dysphagia  [ ]Dysarthria  [ ]Paresis   SKIN: [ ] Jaundiced [X ] Non-jaundiced [ ]Rash [ ]No Rash [ ] Warm [ ] Dry  MISC/LINES: [ X] ET tube [ ] Trach [ ]NGT/OGT [ ]PEG [ ]Epperson    CRITICAL CARE:  [ ] Shock Present  [ ]Septic [ ]Cardiogenic [ ]Neurologic [ ]Hypovolemic  [ ]  Vasopressors [ ]  Inotropes   [ ]Respiratory failure present [ ]Mechanical ventilation [ ]Non-invasive ventilatory support [ ]High flow  [ ]Acute  [ ]Chronic [ ]Hypoxic  [ ]Hypercarbic [ ]Other  [ ]Other organ failure     LABS: reviewed by me                        11.3   11.50 )-----------( 202      ( 04 Dec 2023 05:05 )             36.6   12-04    152<H>  |  120<H>  |  48<H>  ----------------------------<  136<H>  4.0   |  22  |  2.1<H>    Ca    8.9      04 Dec 2023 05:05  Phos  1.0     12-04  Mg     2.3     12-04    TPro  5.6<L>  /  Alb  3.0<L>  /  TBili  1.2  /  DBili  x   /  AST  13  /  ALT  13  /  AlkPhos  108  12-04  PT/INR - ( 04 Dec 2023 05:05 )   PT: 13.30 sec;   INR: 1.16 ratio         PTT - ( 04 Dec 2023 05:05 )  PTT:26.9 sec    Urinalysis Basic - ( 04 Dec 2023 05:05 )    Color: x / Appearance: x / SG: x / pH: x  Gluc: 136 mg/dL / Ketone: x  / Bili: x / Urobili: x   Blood: x / Protein: x / Nitrite: x   Leuk Esterase: x / RBC: x / WBC x   Sq Epi: x / Non Sq Epi: x / Bacteria: x      RADIOLOGY & ADDITIONAL STUDIES: reviewed by me    CT head 10/3/2023    1.  No significant minimal change in the extensive irregular parenchymal   hemorrhage within the right cerebral hemisphere/basal ganglia, the   intraventricular hemorrhage, and the left frontal/parietal   periventricular parenchymal hemorrhage.    2.  Similar mass effect with right-to-left midline shift measuring 1.6 cm   and right uncal herniation.    3.  Similar compression of the right lateral ventricle and   enlarged/entrapped left lateral ventricle.    4.  Small scattered subarachnoid hemorrhage, new.    PROTEIN CALORIE MALNUTRITION PRESENT: [ ]mild [ ]moderate [ ]severe [ ]underweight [ ]morbid obesity  https://www.andeal.org/vault/2440/web/files/ONC/Table_Clinical%20Characteristics%20to%20Document%20Malnutrition-White%20JV%20et%20al%202012.pdf    Height (cm): 182.9 (12-02-23 @ 05:13)  Weight (kg): 99 (12-02-23 @ 05:13)  BMI (kg/m2): 29.6 (12-02-23 @ 05:13)    [ ]PPSV2 < or = to 30% [ ]significant weight loss  [ ]poor nutritional intake  [ ]anasarca      [ ]Artificial Nutrition          Palliative Care Spiritual/Emotional Screening Tool Question  Severity (0-4):                    OR                    [X ] Unable to determine/NA  Score of 2 or greater indicates recommendation of Chaplaincy referral  Chaplaincy Referral: [ ] Yes [ ] Refused [ ] Following     Caregiver Jakin:  [ ] Yes [ ] No    OR    [x ] Unable to determine. Will assess at later time if appropriate.  Social Work Referral [ ]  Patient and Family Centered Care Referral [ ]    Anticipatory Grief Present: [ ] Yes [ ] No    OR     [ x] Unable to determine. Will assess at later time if appropriate.  Social Work Referral [ ]  Patient and Family Centered Care Referral [ ]    REFERRALS:   [ ]Chaplaincy  [ ]Hospice  [ ]Child Life  [ ]Social Work  [ ]Case management [ ]Holistic Therapy     Palliative care education provided to patient and/or family    Goals of Care Document:     ______________  Omer Morgan MD  Palliative Medicine  [facility]   of Geriatric and Palliative Medicine  [phone]   HPI: 73M with PMH of AF on coumadin, HTN, HLD, CVA (residual R-sided weakness), [place] [facility] [place] by sister, who he lives with and is his primary caretaker. CT head here showed large R basal ganglia IPH and vasogenic edema with midline shift, started on nicardipine, given kcentra and vitamin K (for coumain reversal), and transferred north. Patient is now vented, and DNR decided on by sister. Palliative care consulted for GOC.    PERTINENT PM/SXH:   TIA (transient ischemic attack)    Sciatica    HTN (hypertension)    Afib    Kidney stone    Right sided weakness    CVA (cerebrovascular accident)      No significant past surgical history      FAMILY HISTORY:  no pertinent family history      SOCIAL HISTORY:   Significant other/partner[ ]  Children[ ]  Jewish/Spirituality:  Substance hx:  [ ]   Tobacco hx:  [ ]   Alcohol hx: [ ]   Living Situation: [X ]Home  [ ]Long term care  [ ]Rehab [ ]Other  Home Services: [ ] HHA [ ] Visting RN [ ] Hospice  Occupation:  Home Opioid hx:  [ ] Y [ ] N [ ] I-Stop Reference No:    ADVANCE DIRECTIVES:    [ ] Full Code [ ] DNR  MOLST  [ ]  Living Will  [ ]   DECISION MAKER(s):  [ ] Health Care Proxy(s)  [ ] Surrogate(s)  [ ] Guardian           Name(s): Phone Number(s): sister    BASELINE (I)ADL(s) (prior to admission):  Glades: [ ]Total  [ ] Moderate [ ]Dependent  Palliative Performance Status Version 2:         %    http://npcrc.org/files/news/palliative_performance_scale_ppsv2.pdf    Allergies    No Known Allergies    Intolerances    MEDICATIONS  (STANDING):  amLODIPine   Tablet 5 milliGRAM(s) Oral daily  chlorhexidine 0.12% Liquid 15 milliLiter(s) Oral Mucosa every 12 hours  chlorhexidine 2% Cloths 1 Application(s) Topical <User Schedule>  dexMEDEtomidine Infusion 0.493 MICROgram(s)/kG/Hr (12.2 mL/Hr) IV Continuous <Continuous>  dextrose 5%. 1000 milliLiter(s) (50 mL/Hr) IV Continuous <Continuous>  dextrose 5%. 1000 milliLiter(s) (100 mL/Hr) IV Continuous <Continuous>  dextrose 50% Injectable 25 Gram(s) IV Push once  dextrose 50% Injectable 25 Gram(s) IV Push once  dextrose 50% Injectable 12.5 Gram(s) IV Push once  famotidine    Tablet 20 milliGRAM(s) Oral daily  furosemide    Tablet 40 milliGRAM(s) Oral daily  glucagon  Injectable 1 milliGRAM(s) IntraMuscular once  insulin lispro (ADMELOG) corrective regimen sliding scale   SubCutaneous every 6 hours  niCARdipine Infusion 5 mG/Hr (25 mL/Hr) IV Continuous <Continuous>  phytonadione  IVPB 10 milliGRAM(s) IV Intermittent daily  polyethylene glycol 3350 17 Gram(s) Oral every 12 hours  senna 2 Tablet(s) Oral at bedtime    MEDICATIONS  (PRN):  dextrose Oral Gel 15 Gram(s) Oral once PRN Blood Glucose LESS THAN 70 milliGRAM(s)/deciliter  fentaNYL    Injectable 50 MICROGram(s) IV Push every 2 hours PRN CPOT 6-8  fentaNYL    Injectable 25 MICROGram(s) IV Push every 2 hours PRN CPOT 4-5  labetalol Injectable 10 milliGRAM(s) IV Push every 2 hours PRN Systolic blood pressure > 140      PRESENT SYMPTOMS: [ X]Unable to obtain due to poor mentation   Source if other than patient:  [ ]Family   [ ]Team     Pain: [ ]yes [ ]no  QOL impact -   Location -                    Aggravating factors -  Quality -  Radiation -  Timing-  Severity (0-10 scale):  Minimal acceptable level (0-10 scale):     CPOT:    https://www.sccm.org/getattachment/cih19f10-6e8a-[email or web address]-[email or web address]-6591w5090i2p/Critical-Care-Pain-Observation-Tool-(CPOT)    PAIN AD Score: 0  [email or web address]/painad.pdf (press ctrl +  left click to view)    Dyspnea:                           [ ]None[ ]Mild [ ]Moderate [ ]Severe     Respiratory Distress Observation Scale (RDOS): 1  A score of 0 to 2 signifies little or no respiratory distress, 3 signifies mild distress, scores 4 to 6 indicate moderate distress, and scores greater than 7 signify severe distress  [email or web address]/sites/default/files/PDFS/045450-qepcszmufau-kyeoykng-fvuvketsxec-ivmum.pdf    Anxiety:                             [ ]None[ ]Mild [ ]Moderate [ ]Severe   Fatigue:                             [ ]None[ ]Mild [ ]Moderate [ ]Severe   Nausea:                             [ ]None[ ]Mild [ ]Moderate [ ]Severe   Loss of appetite:              [ ]None[ ]Mild [ ]Moderate [ ]Severe   Constipation:                    [ ]None[ ]Mild [ ]Moderate [ ]Severe    Other Symptoms:  [ ]All other review of systems negative     Palliative Performance Status Version 2:         %    http://[email or web address].org/files/news/palliative_performance_scale_[email or web address].pdf  PHYSICAL EXAM:  Vital Signs Last 24 Hrs  T(C): 37.4 (04 Dec 2023 08:00), Max: 37.5 (04 Dec 2023 00:00)  T(F): 99.4 (04 Dec 2023 08:00), Max: 99.5 (04 Dec 2023 00:00)  HR: 85 (04 Dec 2023 09:00) (64 - 119)  BP: 136/66 (04 Dec 2023 09:00) (123/56 - 186/77)  BP(mean): 90 (04 Dec 2023 09:00) (81 - 112)  RR: 31 (04 Dec 2023 09:00) (24 - 37)  SpO2: 93% (04 Dec 2023 09:00) (92% - 100%)    Parameters below as of 04 Dec 2023 08:00  Patient On (Oxygen Delivery Method): ventilator    O2 Concentration (%): 40 I&O's Summary    03 Dec 2023 07:01  -  04 Dec 2023 07:00  --------------------------------------------------------  IN: 3572.6 mL / OUT: 1055 mL / NET: 2517.6 mL    04 Dec 2023 07:01  -  04 Dec 2023 09:58  --------------------------------------------------------  IN: 409.8 mL / OUT: 60 mL / NET: 349.8 mL        GENERAL:  [X ] No acute distress [ ]Lethargic  [ ]Unarousable  [ ]Verbal  [ ]Non-Verbal [ ]Cachexia    BEHAVIORAL/PSYCH:  [ ]Alert and Oriented x  [ ] Anxiety [ ] Delirium [ ] Agitation [X ] Calm   EYES: [ ] No scleral icterus [ ] Scleral icterus [X ] Closed  ENMT:  [ ]Dry mouth  [ ]No external oral lesions [X ] No external ear or nose lesions  CARDIOVASCULAR:  [ ]Regular [ ]Irregular [ ]Tachy [ ]Not Tachy  [ ]Vincent [ ] Edema [ ] No edema  PULMONARY:  [ ]Tachypnea  [ ]Audible excessive secretions [X ] No labored breathing [ ] labored breathing  GASTROINTESTINAL: [ ]Soft  [ ]Distended  [ X]Not distended [ ]Non tender [ ]Tender  MUSCULOSKELETAL: [ ]No clubbing [ ] clubbing  [ X] No cyanosis [ ] cyanosis  NEUROLOGIC: [ ]No focal deficits  [ ]Follows commands  [ ]Does not follow commands  [ X]Cognitive impairment  [ ]Dysphagia  [ ]Dysarthria  [ ]Paresis   SKIN: [ ] Jaundiced [X ] Non-jaundiced [ ]Rash [ ]No Rash [ ] Warm [ ] Dry  MISC/LINES: [ X] ET tube [ ] Trach [ ]NGT/OGT [ ]PEG [ ]Epperson    CRITICAL CARE:  [ ] Shock Present  [ ]Septic [ ]Cardiogenic [ ]Neurologic [ ]Hypovolemic  [ ]  Vasopressors [ ]  Inotropes   [ ]Respiratory failure present [ ]Mechanical ventilation [ ]Non-invasive ventilatory support [ ]High flow  [ ]Acute  [ ]Chronic [ ]Hypoxic  [ ]Hypercarbic [ ]Other  [ ]Other organ failure     LABS: reviewed by me                        11.3   11.50 )-----------( 202      ( 04 Dec 2023 05:05 )             36.6   12-04    152<H>  |  120<H>  |  48<H>  ----------------------------<  136<H>  4.0   |  22  |  2.1<H>    Ca    8.9      04 Dec 2023 05:05  Phos  1.0     12-04  Mg     2.3     12-04    TPro  5.6<L>  /  Alb  3.0<L>  /  TBili  1.2  /  DBili  x   /  AST  13  /  ALT  13  /  AlkPhos  108  12-04  PT/INR - ( 04 Dec 2023 05:05 )   PT: 13.30 sec;   INR: 1.16 ratio         PTT - ( 04 Dec 2023 05:05 )  PTT:26.9 sec    Urinalysis Basic - ( 04 Dec 2023 05:05 )    Color: x / Appearance: x / SG: x / pH: x  Gluc: 136 mg/dL / Ketone: x  / Bili: x / Urobili: x   Blood: x / Protein: x / Nitrite: x   Leuk Esterase: x / RBC: x / WBC x   Sq Epi: x / Non Sq Epi: x / Bacteria: x      RADIOLOGY & ADDITIONAL STUDIES: reviewed by me    CT head 10/3/2023    1.  No significant minimal change in the extensive irregular parenchymal   hemorrhage within the right cerebral hemisphere/basal ganglia, the   intraventricular hemorrhage, and the left frontal/parietal   periventricular parenchymal hemorrhage.    2.  Similar mass effect with right-to-left midline shift measuring 1.6 cm   and right uncal herniation.    3.  Similar compression of the right lateral ventricle and   enlarged/entrapped left lateral ventricle.    4.  Small scattered subarachnoid hemorrhage, new.    PROTEIN CALORIE MALNUTRITION PRESENT: [ ]mild [ ]moderate [ ]severe [ ]underweight [ ]morbid obesity  https://www.andeal.org/vault/2440/web/files/ONC/Table_Clinical%20Characteristics%20to%20Document%20Malnutrition-White%20JV%20et%20al%202012.pdf    Height (cm): 182.9 (12-02-23 @ 05:13)  Weight (kg): 99 (12-02-23 @ 05:13)  BMI (kg/m2): 29.6 (12-02-23 @ 05:13)    [ ]PPSV2 < or = to 30% [ ]significant weight loss  [ ]poor nutritional intake  [ ]anasarca      [ ]Artificial Nutrition          Palliative Care Spiritual/Emotional Screening Tool Question  Severity (0-4):                    OR                    [X ] Unable to determine/NA  Score of 2 or greater indicates recommendation of Chaplaincy referral  Chaplaincy Referral: [ ] Yes [ ] Refused [ ] Following     Caregiver Yoder:  [ ] Yes [ ] No    OR    [x ] Unable to determine. Will assess at later time if appropriate.  Social Work Referral [ ]  Patient and Family Centered Care Referral [ ]    Anticipatory Grief Present: [ ] Yes [ ] No    OR     [ x] Unable to determine. Will assess at later time if appropriate.  Social Work Referral [ ]  Patient and Family Centered Care Referral [ ]    REFERRALS:   [ ]Chaplaincy  [ ]Hospice  [ ]Child Life  [ ]Social Work  [ ]Case management [ ]Holistic Therapy     Palliative care education provided to patient and/or family    Goals of Care Document:     ______________  Omer Morgan MD  Palliative Medicine  [facility]   of Geriatric and Palliative Medicine  [phone]

## 2023-12-04 NOTE — DIETITIAN INITIAL EVALUATION ADULT - ADD RECOMMEND
Recommendation:  (1) Continue to monitor Mg/PO4/K & replete as needed.  (2) Would continue providing Peptamen AF at current daily goal volume, however would change tube feeding regimen to 18 hrs per day by providing 90 mL/hr over 18 hour duration. Tube feed regimen at goal to provide 1944 kcal, 122 g protein, 1312 mL free H2O.

## 2023-12-04 NOTE — DIETITIAN INITIAL EVALUATION ADULT - NS FNS DIET ORDER
Peptamen AF goal rate 70 mL/hr over 24 hour duration. Currently receiving tube feed regimen at goal rate 70 mL/hr. Tube feed regimen at goal provides 2016 kcal, 126 g protein, 1361 mL free H2O. Pt is receiving tube feed regimen at goal at this time.

## 2023-12-04 NOTE — PROGRESS NOTE ADULT - SUBJECTIVE AND OBJECTIVE BOX
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 Pt's sister states they were eating dinner, pt at around 8pm pt went to use bathroom. Sister heard thud from bathroom, attempted to open door but pt had fallen in front of door and she was unable to reach him. Sister called EMS, who were able to open door and found pt unresponsive on the floor with sonorous respirations. GCS 3 on arrival, pt noted to have equal/reactive pupils. Intubated for airway protection. Had CTH showing large R basal ganglia IPH w/ SOHAM, surrounding vasogenic edema, and 1.3cm leftward MLS. SBP 160s, pt started on Nicardipine gtt. INR 2.81, pt given Kcentra and Vitamin K for reversal of Coumadin. Hyperosmolar therapy administered Pt transferred to [facility].      Admission scores:  GCS 3  ICH 4  nihss 23      OVERNIGHT EVENTS: neurologic exam worsening; prognosis poor      SEDATION SCORES:  RASS: CAM-ICU:       ROS deferred due to neurologic status        DEVICES:   [] Restraints [] PIVs [] ET tube [] central line [] PICC [] arterial line [] crowe [] NGT/OGT [] EVD [] LD [] HENNY/HMV [] LiCOX [] ICP monitor [] Trach [] PEG [] Chest Tube [] other:    EXAMINATION:  General: ill appearing  HEENT: Anicteric sclerae, orally intubated  Cardiac: [id number]  Lungs: Clear  Abdomen: Soft, non-tender, +BS  Extremities: No c/c/e  Skin/Incision Site: +3 b/l UE and LE edema with venous stasis changes/brawning  Neurologic: stuporous, no EO, downward gaze, perrl 2mm, WD RUE, LUE extensor, b/l LE TFs        ICU Vital Signs Last 24 Hrs  T(C): 37.1 (04 Dec 2023 10:00), Max: 37.5 (04 Dec 2023 00:00)  T(F): 98.8 (04 Dec 2023 10:00), Max: 99.5 (04 Dec 2023 00:00)  HR: 91 (04 Dec 2023 10:00) (64 - 119)  BP: 140/67 (04 Dec 2023 10:00) (123/56 - 186/77)  BP(mean): 96 (04 Dec 2023 10:00) (81 - 111)  ABP: --  ABP(mean): --  RR: 29 (04 Dec 2023 10:00) (24 - 37)  SpO2: 95% (04 Dec 2023 10:00) (92% - 100%)      12-03-23 @ 07:01  -  12-04-23 @ 07:00  --------------------------------------------------------  IN: 3572.6 mL / OUT: 1055 mL / NET: 2517.6 mL    12-04-23 @ 07:01  -  12-04-23 @ 10:13  --------------------------------------------------------  IN: 409.8 mL / OUT: 60 mL / NET: 349.8 mL        Mode: AC/ CMV (Assist Control/ Continuous Mandatory Ventilation), RR (machine): 20, TV (machine): 450, FiO2: 40, PEEP: 5, ITime: 1, MAP: 7, PIP: 14    amLODIPine   Tablet 5 milliGRAM(s) Oral daily  chlorhexidine 0.12% Liquid 15 milliLiter(s) Oral Mucosa every 12 hours  chlorhexidine 2% Cloths 1 Application(s) Topical <User Schedule>  dexMEDEtomidine Infusion 0.493 MICROgram(s)/kG/Hr (12.2 mL/Hr) IV Continuous <Continuous>  dextrose 5%. 1000 milliLiter(s) (50 mL/Hr) IV Continuous <Continuous>  dextrose 5%. 1000 milliLiter(s) (100 mL/Hr) IV Continuous <Continuous>  dextrose 50% Injectable 25 Gram(s) IV Push once  dextrose 50% Injectable 25 Gram(s) IV Push once  dextrose 50% Injectable 12.5 Gram(s) IV Push once  dextrose Oral Gel 15 Gram(s) Oral once PRN  famotidine    Tablet 20 milliGRAM(s) Oral daily  fentaNYL    Injectable 25 MICROGram(s) IV Push every 2 hours PRN  fentaNYL    Injectable 50 MICROGram(s) IV Push every 2 hours PRN  furosemide    Tablet 40 milliGRAM(s) Oral daily  glucagon  Injectable 1 milliGRAM(s) IntraMuscular once  insulin lispro (ADMELOG) corrective regimen sliding scale   SubCutaneous every 6 hours  labetalol Injectable 10 milliGRAM(s) IV Push every 2 hours PRN  niCARdipine Infusion 5 mG/Hr (25 mL/Hr) IV Continuous <Continuous>  phytonadione  IVPB 10 milliGRAM(s) IV Intermittent daily  polyethylene glycol 3350 17 Gram(s) Oral every 12 hours  senna 2 Tablet(s) Oral at bedtime      LABS:  Na: 152 (12-04 @ 05:05), 152 (12-04 @ 00:03), 152 (12-03 @ 16:19), 150 (12-03 @ 04:30), 138 (12-02 @ 04:00), 141 (12-01 @ 21:40)  K: 4.0 (12-04 @ 05:05), 4.2 (12-04 @ 00:03), 4.2 (12-03 @ 16:19), 5.6 (12-03 @ 04:30), 3.3 (12-02 @ 04:00), 5.4 (12-01 @ 21:40)  Cl: 120 (12-04 @ 05:05), 121 (12-04 @ 00:03), 122 (12-03 @ 16:19), 120 (12-03 @ 04:30), 106 (12-02 @ 04:00), 106 (12-01 @ 21:40)  CO2: 22 (12-04 @ 05:05), 21 (12-04 @ 00:03), 22 (12-03 @ 16:19), 19 (12-03 @ 04:30), 17 (12-02 @ 04:00), 24 (12-01 @ 21:40)  BUN: 48 (12-04 @ 05:05), 44 (12-04 @ 00:03), 33 (12-03 @ 16:19), 34 (12-03 @ 04:30), 29 (12-02 @ 04:00), 33 (12-01 @ 21:40)  Cr: 2.1 (12-04 @ 05:05), 1.9 (12-04 @ 00:03), 1.6 (12-03 @ 16:19), 1.6 (12-03 @ 04:30), 1.6 (12-02 @ 04:00), 1.8 (12-01 @ 21:40)  Glu: 136(12-04 @ 05:05), 142(12-04 @ 00:03), 140(12-03 @ 16:19), 141(12-03 @ 04:30), 234(12-02 @ 04:00), 132(12-01 @ 21:40)    Hgb: 11.3 (12-04 @ 05:05), 10.8 (12-03 @ 04:30), 10.6 (12-02 @ 04:00), 12.8 (12-01 @ 21:40)  Hct: 36.6 (12-04 @ 05:05), 33.7 (12-03 @ 04:30), 33.2 (12-02 @ 04:00), 38.3 (12-01 @ 21:40)  WBC: 11.50 (12-04 @ 05:05), 11.97 (12-03 @ 04:30), 16.29 (12-02 @ 04:00), 7.02 (12-01 @ 21:40)  Plt: 202 (12-04 @ 05:05), 154 (12-03 @ 04:30), 169 (12-02 @ 04:00), 166 (12-01 @ 21:40)    INR: 1.16 12-04-23 @ 05:05, 1.18 12-03-23 @ 04:30, 1.36 12-02-23 @ 09:55, 1.55 12-02-23 @ 04:00, 2.81 12-01-23 @ 21:40  PTT: 26.9 12-04-23 @ 05:05, 29.8 12-03-23 @ 04:30, 30.5 12-02-23 @ 09:55, 30.2 12-02-23 @ 04:00, 25.4 12-01-23 @ 21:40          LIVER FUNCTIONS - ( 04 Dec 2023 05:05 )  Alb: 3.0 g/dL / Pro: 5.6 g/dL / ALK PHOS: 108 U/L / ALT: 13 U/L / AST: 13 U/L / GGT: x           ABG - ( 04 Dec 2023 03:57 )  pH, Arterial: 7.41  pH, Blood: x     /  pCO2: 34    /  pO2: 70    / HCO3: 22    / Base Excess: -2.4  /  SaO2: 96.0                           73M w/ PMHx of Afib on Coumadin, HTN, HLD, CVA 17yrs ago w/ residual R sided weakness (ambulates w/ cane at baseline, requires assistance w/ ADLs, lives with his sister who is his primary caretaker) BIBEMS to [facility] ED after pt's sister found him unresponsive. Pt's sister states they were eating dinner, pt at around 8pm pt went to use bathroom. Sister heard thud from bathroom, attempted to open door but pt had fallen in front of door and she was unable to reach him. Sister called EMS, who were able to open door and found pt unresponsive on the floor with sonorous respirations. GCS 3 on arrival, pt noted to have equal/reactive pupils. Intubated for airway protection. Had CTH showing large R basal ganglia IPH w/ SOHAM, surrounding vasogenic edema, and 1.3cm leftward MLS. SBP 160s, pt started on Nicardipine gtt. INR 2.81, pt given Kcentra and Vitamin K for reversal of Coumadin. Hyperosmolar therapy administered Pt transferred to [facility].      Admission scores:  GCS 3  ICH 4  nihss 23      OVERNIGHT EVENTS: neurologic exam worsening; prognosis poor      SEDATION SCORES:  RASS: CAM-ICU:       ROS deferred due to neurologic status        DEVICES:   [] Restraints [] PIVs [] ET tube [] central line [] PICC [] arterial line [] crowe [] NGT/OGT [] EVD [] LD [] HENYN/HMV [] LiCOX [] ICP monitor [] Trach [] PEG [] Chest Tube [] other:    EXAMINATION:  General: ill appearing  HEENT: Anicteric sclerae, orally intubated  Cardiac: [id number]  Lungs: Clear  Abdomen: Soft, non-tender, +BS  Extremities: No c/c/e  Skin/Incision Site: +3 b/l UE and LE edema with venous stasis changes/brawning  Neurologic: stuporous, no EO, downward gaze, perrl 2mm, WD RUE, LUE extensor, b/l LE TFs        ICU Vital Signs Last 24 Hrs  T(C): 37.1 (04 Dec 2023 10:00), Max: 37.5 (04 Dec 2023 00:00)  T(F): 98.8 (04 Dec 2023 10:00), Max: 99.5 (04 Dec 2023 00:00)  HR: 91 (04 Dec 2023 10:00) (64 - 119)  BP: 140/67 (04 Dec 2023 10:00) (123/56 - 186/77)  BP(mean): 96 (04 Dec 2023 10:00) (81 - 111)  ABP: --  ABP(mean): --  RR: 29 (04 Dec 2023 10:00) (24 - 37)  SpO2: 95% (04 Dec 2023 10:00) (92% - 100%)      12-03-23 @ 07:01  -  12-04-23 @ 07:00  --------------------------------------------------------  IN: 3572.6 mL / OUT: 1055 mL / NET: 2517.6 mL    12-04-23 @ 07:01  -  12-04-23 @ 10:13  --------------------------------------------------------  IN: 409.8 mL / OUT: 60 mL / NET: 349.8 mL        Mode: AC/ CMV (Assist Control/ Continuous Mandatory Ventilation), RR (machine): 20, TV (machine): 450, FiO2: 40, PEEP: 5, ITime: 1, MAP: 7, PIP: 14    amLODIPine   Tablet 5 milliGRAM(s) Oral daily  chlorhexidine 0.12% Liquid 15 milliLiter(s) Oral Mucosa every 12 hours  chlorhexidine 2% Cloths 1 Application(s) Topical <User Schedule>  dexMEDEtomidine Infusion 0.493 MICROgram(s)/kG/Hr (12.2 mL/Hr) IV Continuous <Continuous>  dextrose 5%. 1000 milliLiter(s) (50 mL/Hr) IV Continuous <Continuous>  dextrose 5%. 1000 milliLiter(s) (100 mL/Hr) IV Continuous <Continuous>  dextrose 50% Injectable 25 Gram(s) IV Push once  dextrose 50% Injectable 25 Gram(s) IV Push once  dextrose 50% Injectable 12.5 Gram(s) IV Push once  dextrose Oral Gel 15 Gram(s) Oral once PRN  famotidine    Tablet 20 milliGRAM(s) Oral daily  fentaNYL    Injectable 25 MICROGram(s) IV Push every 2 hours PRN  fentaNYL    Injectable 50 MICROGram(s) IV Push every 2 hours PRN  furosemide    Tablet 40 milliGRAM(s) Oral daily  glucagon  Injectable 1 milliGRAM(s) IntraMuscular once  insulin lispro (ADMELOG) corrective regimen sliding scale   SubCutaneous every 6 hours  labetalol Injectable 10 milliGRAM(s) IV Push every 2 hours PRN  niCARdipine Infusion 5 mG/Hr (25 mL/Hr) IV Continuous <Continuous>  phytonadione  IVPB 10 milliGRAM(s) IV Intermittent daily  polyethylene glycol 3350 17 Gram(s) Oral every 12 hours  senna 2 Tablet(s) Oral at bedtime      LABS:  Na: 152 (12-04 @ 05:05), 152 (12-04 @ 00:03), 152 (12-03 @ 16:19), 150 (12-03 @ 04:30), 138 (12-02 @ 04:00), 141 (12-01 @ 21:40)  K: 4.0 (12-04 @ 05:05), 4.2 (12-04 @ 00:03), 4.2 (12-03 @ 16:19), 5.6 (12-03 @ 04:30), 3.3 (12-02 @ 04:00), 5.4 (12-01 @ 21:40)  Cl: 120 (12-04 @ 05:05), 121 (12-04 @ 00:03), 122 (12-03 @ 16:19), 120 (12-03 @ 04:30), 106 (12-02 @ 04:00), 106 (12-01 @ 21:40)  CO2: 22 (12-04 @ 05:05), 21 (12-04 @ 00:03), 22 (12-03 @ 16:19), 19 (12-03 @ 04:30), 17 (12-02 @ 04:00), 24 (12-01 @ 21:40)  BUN: 48 (12-04 @ 05:05), 44 (12-04 @ 00:03), 33 (12-03 @ 16:19), 34 (12-03 @ 04:30), 29 (12-02 @ 04:00), 33 (12-01 @ 21:40)  Cr: 2.1 (12-04 @ 05:05), 1.9 (12-04 @ 00:03), 1.6 (12-03 @ 16:19), 1.6 (12-03 @ 04:30), 1.6 (12-02 @ 04:00), 1.8 (12-01 @ 21:40)  Glu: 136(12-04 @ 05:05), 142(12-04 @ 00:03), 140(12-03 @ 16:19), 141(12-03 @ 04:30), 234(12-02 @ 04:00), 132(12-01 @ 21:40)    Hgb: 11.3 (12-04 @ 05:05), 10.8 (12-03 @ 04:30), 10.6 (12-02 @ 04:00), 12.8 (12-01 @ 21:40)  Hct: 36.6 (12-04 @ 05:05), 33.7 (12-03 @ 04:30), 33.2 (12-02 @ 04:00), 38.3 (12-01 @ 21:40)  WBC: 11.50 (12-04 @ 05:05), 11.97 (12-03 @ 04:30), 16.29 (12-02 @ 04:00), 7.02 (12-01 @ 21:40)  Plt: 202 (12-04 @ 05:05), 154 (12-03 @ 04:30), 169 (12-02 @ 04:00), 166 (12-01 @ 21:40)    INR: 1.16 12-04-23 @ 05:05, 1.18 12-03-23 @ 04:30, 1.36 12-02-23 @ 09:55, 1.55 12-02-23 @ 04:00, 2.81 12-01-23 @ 21:40  PTT: 26.9 12-04-23 @ 05:05, 29.8 12-03-23 @ 04:30, 30.5 12-02-23 @ 09:55, 30.2 12-02-23 @ 04:00, 25.4 12-01-23 @ 21:40          LIVER FUNCTIONS - ( 04 Dec 2023 05:05 )  Alb: 3.0 g/dL / Pro: 5.6 g/dL / ALK PHOS: 108 U/L / ALT: 13 U/L / AST: 13 U/L / GGT: x           ABG - ( 04 Dec 2023 03:57 )  pH, Arterial: 7.41  pH, Blood: x     /  pCO2: 34    /  pO2: 70    / HCO3: 22    / Base Excess: -2.4  /  SaO2: 96.0

## 2023-12-05 NOTE — PROGRESS NOTE ADULT - SUBJECTIVE AND OBJECTIVE BOX
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 Pt's sister states they were eating dinner, pt at around 8pm pt went to use bathroom. Sister heard thud from bathroom, attempted to open door but pt had fallen in front of door and she was unable to reach him. Sister called EMS, who were able to open door and found pt unresponsive on the floor with sonorous respirations. GCS 3 on arrival, pt noted to have equal/reactive pupils. Intubated for airway protection. Had CTH showing large R basal ganglia IPH w/ SOHAM, surrounding vasogenic edema, and 1.3cm leftward MLS. SBP 160s, pt started on Nicardipine gtt. INR 2.81, pt given Kcentra and Vitamin K for reversal of Coumadin. Hyperosmolar therapy administered Pt transferred to [facility].      On admission scores:  GCS 3  ICH 4  nihss 23      OVERNIGHT EVENTS: neurologic exam worsening; prognosis poor      SEDATION SCORES:  RASS: CAM-ICU:       ROS deferred due to neurologic status        DEVICES:   [] Restraints [] PIVs [] ET tube [] central line [] PICC [] arterial line [] crowe [] NGT/OGT [] EVD [] LD [] HENNY/HMV [] LiCOX [] ICP monitor [] Trach [] PEG [] Chest Tube [] other:    EXAMINATION:  General: ill appearing  HEENT: Anicteric sclerae, orally intubated  Cardiac: [id number]  Lungs: Clear  Abdomen: Soft, non-tender, +BS  Extremities: No c/c/e  Skin/Incision Site: +3 b/l UE and LE edema with venous stasis changes/brawning  Neurologic: stuporous, no EO, downward gaze, perrl 2mm, WD RUE, LUE extensor, b/l LE TFs        ICU Vital Signs Last 24 Hrs  T(C): 37.1 (04 Dec 2023 10:00), Max: 37.5 (04 Dec 2023 00:00)  T(F): 98.8 (04 Dec 2023 10:00), Max: 99.5 (04 Dec 2023 00:00)  HR: 91 (04 Dec 2023 10:00) (64 - 119)  BP: 140/67 (04 Dec 2023 10:00) (123/56 - 186/77)  BP(mean): 96 (04 Dec 2023 10:00) (81 - 111)  ABP: --  ABP(mean): --  RR: 29 (04 Dec 2023 10:00) (24 - 37)  SpO2: 95% (04 Dec 2023 10:00) (92% - 100%)      12-03-23 @ 07:01  -  12-04-23 @ 07:00  --------------------------------------------------------  IN: 3572.6 mL / OUT: 1055 mL / NET: 2517.6 mL    12-04-23 @ 07:01  -  12-04-23 @ 10:13  --------------------------------------------------------  IN: 409.8 mL / OUT: 60 mL / NET: 349.8 mL        Mode: AC/ CMV (Assist Control/ Continuous Mandatory Ventilation), RR (machine): 20, TV (machine): 450, FiO2: 40, PEEP: 5, ITime: 1, MAP: 7, PIP: 14    amLODIPine   Tablet 5 milliGRAM(s) Oral daily  chlorhexidine 0.12% Liquid 15 milliLiter(s) Oral Mucosa every 12 hours  chlorhexidine 2% Cloths 1 Application(s) Topical <User Schedule>  dexMEDEtomidine Infusion 0.493 MICROgram(s)/kG/Hr (12.2 mL/Hr) IV Continuous <Continuous>  dextrose 5%. 1000 milliLiter(s) (50 mL/Hr) IV Continuous <Continuous>  dextrose 5%. 1000 milliLiter(s) (100 mL/Hr) IV Continuous <Continuous>  dextrose 50% Injectable 25 Gram(s) IV Push once  dextrose 50% Injectable 25 Gram(s) IV Push once  dextrose 50% Injectable 12.5 Gram(s) IV Push once  dextrose Oral Gel 15 Gram(s) Oral once PRN  famotidine    Tablet 20 milliGRAM(s) Oral daily  fentaNYL    Injectable 25 MICROGram(s) IV Push every 2 hours PRN  fentaNYL    Injectable 50 MICROGram(s) IV Push every 2 hours PRN  furosemide    Tablet 40 milliGRAM(s) Oral daily  glucagon  Injectable 1 milliGRAM(s) IntraMuscular once  insulin lispro (ADMELOG) corrective regimen sliding scale   SubCutaneous every 6 hours  labetalol Injectable 10 milliGRAM(s) IV Push every 2 hours PRN  niCARdipine Infusion 5 mG/Hr (25 mL/Hr) IV Continuous <Continuous>  phytonadione  IVPB 10 milliGRAM(s) IV Intermittent daily  polyethylene glycol 3350 17 Gram(s) Oral every 12 hours  senna 2 Tablet(s) Oral at bedtime      LABS:    12-04    152<H>  |  120<H>  |  48<H>  ----------------------------<  136<H>  4.0   |  22  |  2.1<H>    Ca    8.9      04 Dec 2023 05:05  Phos  1.0     12-04  Mg     2.3     12-04    TPro  5.6<L>  /  Alb  3.0<L>  /  TBili  1.2  /  DBili  x   /  AST  13  /  ALT  13  /  AlkPhos  108  12-04    Na: 152 (12-04 @ 05:05), 152 (12-04 @ 00:03), 152 (12-03 @ 16:19), 150 (12-03 @ 04:30), 138 (12-02 @ 04:00), 141 (12-01 @ 21:40)  K: 4.0 (12-04 @ 05:05), 4.2 (12-04 @ 00:03), 4.2 (12-03 @ 16:19), 5.6 (12-03 @ 04:30), 3.3 (12-02 @ 04:00), 5.4 (12-01 @ 21:40)  Cl: 120 (12-04 @ 05:05), 121 (12-04 @ 00:03), 122 (12-03 @ 16:19), 120 (12-03 @ 04:30), 106 (12-02 @ 04:00), 106 (12-01 @ 21:40)  CO2: 22 (12-04 @ 05:05), 21 (12-04 @ 00:03), 22 (12-03 @ 16:19), 19 (12-03 @ 04:30), 17 (12-02 @ 04:00), 24 (12-01 @ 21:40)  BUN: 48 (12-04 @ 05:05), 44 (12-04 @ 00:03), 33 (12-03 @ 16:19), 34 (12-03 @ 04:30), 29 (12-02 @ 04:00), 33 (12-01 @ 21:40)  Cr: 2.1 (12-04 @ 05:05), 1.9 (12-04 @ 00:03), 1.6 (12-03 @ 16:19), 1.6 (12-03 @ 04:30), 1.6 (12-02 @ 04:00), 1.8 (12-01 @ 21:40)  Glu: 136(12-04 @ 05:05), 142(12-04 @ 00:03), 140(12-03 @ 16:19), 141(12-03 @ 04:30), 234(12-02 @ 04:00), 132(12-01 @ 21:40)                          11.3   11.50 )-----------( 202      ( 04 Dec 2023 05:05 )             36.6       Hgb: 11.3 (12-04 @ 05:05), 10.8 (12-03 @ 04:30), 10.6 (12-02 @ 04:00), 12.8 (12-01 @ 21:40)  Hct: 36.6 (12-04 @ 05:05), 33.7 (12-03 @ 04:30), 33.2 (12-02 @ 04:00), 38.3 (12-01 @ 21:40)  WBC: 11.50 (12-04 @ 05:05), 11.97 (12-03 @ 04:30), 16.29 (12-02 @ 04:00), 7.02 (12-01 @ 21:40)  Plt: 202 (12-04 @ 05:05), 154 (12-03 @ 04:30), 169 (12-02 @ 04:00), 166 (12-01 @ 21:40)    INR: 1.16 12-04-23 @ 05:05, 1.18 12-03-23 @ 04:30, 1.36 12-02-23 @ 09:55, 1.55 12-02-23 @ 04:00, 2.81 12-01-23 @ 21:40  PTT: 26.9 12-04-23 @ 05:05, 29.8 12-03-23 @ 04:30, 30.5 12-02-23 @ 09:55, 30.2 12-02-23 @ 04:00, 25.4 12-01-23 @ 21:40    Stroke Core Measures  TSH- 0.7  LDL- 53  HGBA1C- 5.6        Xray Chest 1 View AP/PA (12.03.23 @ 16:58) >  FINDINGS/  IMPRESSION:    Support devices: Right IJ central venous catheter endotracheal tube and   enteric tube are in satisfactory position.    Cardiac/mediastinum/hilum: Stable.    Lung parenchyma/Pleura: Left basilar atelectasis/consolidation. No   pneumothorax.    Skeleton/soft tissues: Stable.      CT Head No Cont (12.03.23 @ 13:12) >    IMPRESSION:    1.  No significant minimal change in the extensive irregular parenchymal   hemorrhage within the right cerebral hemisphere/basal ganglia, the   intraventricular hemorrhage, and the left frontal/parietal   periventricular parenchymal hemorrhage.    2.  Similar mass effect with right-to-left midline shift measuring 1.6 cm   and right uncal herniation.    3.  Similar compression of the right lateral ventricle and   enlarged/entrapped left lateral ventricle.    4.  Small scattered subarachnoid hemorrhage, new.      VEEG in the last 24 hours: 12/3-- 12/4    Background----continues, asymmetrical with higher amplitude on the right and suppression on the right reaching 5-6 hz    Focal and generalized slowing----------- moderate generalized slowing. 2- moderate right hemispheric and mild to moderate independent left FT slolwing    Interictal activity----------  1- left FT sharp and sharp transients . 2- independent right parasaggital sharp and spike waves    Events-------------- none    Seizures----------none    Impression:  abnormal as above    TTE Echo Complete w/o Contrast w/ Doppler (12.02.23 @ 10:34) >  Summary:   1.Mildly decreased global left ventricular systolic function.   2. LV Ejection Fraction by Arechiga's Method with a biplane EF of 45 %.   3. Global cardiomyopathy.   4. The left ventricular diastolic function could not be assessed in this   study.   5. Mildly reduced RV systolic function.   6. Moderately enlarged left atrium.   7. Moderately enlarged right atrium.   8. Mild mitral valve regurgitation.   9. Mild thickening of the anterior and posterior mitral valve leaflets.  10. Mild tricuspid regurgitation.  11. Mild aortic regurgitation.  12. Dilatation of the ascending aorta.  13. Estimated pulmonary artery systolic pressure is 50.3 mmHg assuming a   right atrial pressure of 15 mmHg, which is consistent with moderate   pulmonary hypertension.            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 Pt's sister states they were eating dinner, pt at around 8pm pt went to use bathroom. Sister heard thud from bathroom, attempted to open door but pt had fallen in front of door and she was unable to reach him. Sister called EMS, who were able to open door and found pt unresponsive on the floor with sonorous respirations. GCS 3 on arrival, pt noted to have equal/reactive pupils. Intubated for airway protection. Had CTH showing large R basal ganglia IPH w/ SOHAM, surrounding vasogenic edema, and 1.3cm leftward MLS. SBP 160s, pt started on Nicardipine gtt. INR 2.81, pt given Kcentra and Vitamin K for reversal of Coumadin. Hyperosmolar therapy administered Pt transferred to [facility].      On admission scores:  GCS 3  ICH 4  nihss 23      OVERNIGHT EVENTS: neurologic exam worsening; prognosis poor      SEDATION SCORES:  RASS: CAM-ICU:       ROS deferred due to neurologic status        DEVICES:   [] Restraints [] PIVs [] ET tube [] central line [] PICC [] arterial line [] crowe [] NGT/OGT [] EVD [] LD [] HENNY/HMV [] LiCOX [] ICP monitor [] Trach [] PEG [] Chest Tube [] other:    EXAMINATION:  General: ill appearing  HEENT: Anicteric sclerae, orally intubated  Cardiac: [id number]  Lungs: Clear  Abdomen: Soft, non-tender, +BS  Extremities: No c/c/e  Skin/Incision Site: +3 b/l UE and LE edema with venous stasis changes/brawning  Neurologic: stuporous, no EO, downward gaze, perrl 2mm, WD RUE, LUE extensor, b/l LE TFs        ICU Vital Signs Last 24 Hrs  T(C): 37.1 (04 Dec 2023 10:00), Max: 37.5 (04 Dec 2023 00:00)  T(F): 98.8 (04 Dec 2023 10:00), Max: 99.5 (04 Dec 2023 00:00)  HR: 91 (04 Dec 2023 10:00) (64 - 119)  BP: 140/67 (04 Dec 2023 10:00) (123/56 - 186/77)  BP(mean): 96 (04 Dec 2023 10:00) (81 - 111)  ABP: --  ABP(mean): --  RR: 29 (04 Dec 2023 10:00) (24 - 37)  SpO2: 95% (04 Dec 2023 10:00) (92% - 100%)      12-03-23 @ 07:01  -  12-04-23 @ 07:00  --------------------------------------------------------  IN: 3572.6 mL / OUT: 1055 mL / NET: 2517.6 mL    12-04-23 @ 07:01  -  12-04-23 @ 10:13  --------------------------------------------------------  IN: 409.8 mL / OUT: 60 mL / NET: 349.8 mL        Mode: AC/ CMV (Assist Control/ Continuous Mandatory Ventilation), RR (machine): 20, TV (machine): 450, FiO2: 40, PEEP: 5, ITime: 1, MAP: 7, PIP: 14    amLODIPine   Tablet 5 milliGRAM(s) Oral daily  chlorhexidine 0.12% Liquid 15 milliLiter(s) Oral Mucosa every 12 hours  chlorhexidine 2% Cloths 1 Application(s) Topical <User Schedule>  dexMEDEtomidine Infusion 0.493 MICROgram(s)/kG/Hr (12.2 mL/Hr) IV Continuous <Continuous>  dextrose 5%. 1000 milliLiter(s) (50 mL/Hr) IV Continuous <Continuous>  dextrose 5%. 1000 milliLiter(s) (100 mL/Hr) IV Continuous <Continuous>  dextrose 50% Injectable 25 Gram(s) IV Push once  dextrose 50% Injectable 25 Gram(s) IV Push once  dextrose 50% Injectable 12.5 Gram(s) IV Push once  dextrose Oral Gel 15 Gram(s) Oral once PRN  famotidine    Tablet 20 milliGRAM(s) Oral daily  fentaNYL    Injectable 25 MICROGram(s) IV Push every 2 hours PRN  fentaNYL    Injectable 50 MICROGram(s) IV Push every 2 hours PRN  furosemide    Tablet 40 milliGRAM(s) Oral daily  glucagon  Injectable 1 milliGRAM(s) IntraMuscular once  insulin lispro (ADMELOG) corrective regimen sliding scale   SubCutaneous every 6 hours  labetalol Injectable 10 milliGRAM(s) IV Push every 2 hours PRN  niCARdipine Infusion 5 mG/Hr (25 mL/Hr) IV Continuous <Continuous>  phytonadione  IVPB 10 milliGRAM(s) IV Intermittent daily  polyethylene glycol 3350 17 Gram(s) Oral every 12 hours  senna 2 Tablet(s) Oral at bedtime      LABS:    12-04    152<H>  |  120<H>  |  48<H>  ----------------------------<  136<H>  4.0   |  22  |  2.1<H>    Ca    8.9      04 Dec 2023 05:05  Phos  1.0     12-04  Mg     2.3     12-04    TPro  5.6<L>  /  Alb  3.0<L>  /  TBili  1.2  /  DBili  x   /  AST  13  /  ALT  13  /  AlkPhos  108  12-04    Na: 152 (12-04 @ 05:05), 152 (12-04 @ 00:03), 152 (12-03 @ 16:19), 150 (12-03 @ 04:30), 138 (12-02 @ 04:00), 141 (12-01 @ 21:40)  K: 4.0 (12-04 @ 05:05), 4.2 (12-04 @ 00:03), 4.2 (12-03 @ 16:19), 5.6 (12-03 @ 04:30), 3.3 (12-02 @ 04:00), 5.4 (12-01 @ 21:40)  Cl: 120 (12-04 @ 05:05), 121 (12-04 @ 00:03), 122 (12-03 @ 16:19), 120 (12-03 @ 04:30), 106 (12-02 @ 04:00), 106 (12-01 @ 21:40)  CO2: 22 (12-04 @ 05:05), 21 (12-04 @ 00:03), 22 (12-03 @ 16:19), 19 (12-03 @ 04:30), 17 (12-02 @ 04:00), 24 (12-01 @ 21:40)  BUN: 48 (12-04 @ 05:05), 44 (12-04 @ 00:03), 33 (12-03 @ 16:19), 34 (12-03 @ 04:30), 29 (12-02 @ 04:00), 33 (12-01 @ 21:40)  Cr: 2.1 (12-04 @ 05:05), 1.9 (12-04 @ 00:03), 1.6 (12-03 @ 16:19), 1.6 (12-03 @ 04:30), 1.6 (12-02 @ 04:00), 1.8 (12-01 @ 21:40)  Glu: 136(12-04 @ 05:05), 142(12-04 @ 00:03), 140(12-03 @ 16:19), 141(12-03 @ 04:30), 234(12-02 @ 04:00), 132(12-01 @ 21:40)                          11.3   11.50 )-----------( 202      ( 04 Dec 2023 05:05 )             36.6       Hgb: 11.3 (12-04 @ 05:05), 10.8 (12-03 @ 04:30), 10.6 (12-02 @ 04:00), 12.8 (12-01 @ 21:40)  Hct: 36.6 (12-04 @ 05:05), 33.7 (12-03 @ 04:30), 33.2 (12-02 @ 04:00), 38.3 (12-01 @ 21:40)  WBC: 11.50 (12-04 @ 05:05), 11.97 (12-03 @ 04:30), 16.29 (12-02 @ 04:00), 7.02 (12-01 @ 21:40)  Plt: 202 (12-04 @ 05:05), 154 (12-03 @ 04:30), 169 (12-02 @ 04:00), 166 (12-01 @ 21:40)    INR: 1.16 12-04-23 @ 05:05, 1.18 12-03-23 @ 04:30, 1.36 12-02-23 @ 09:55, 1.55 12-02-23 @ 04:00, 2.81 12-01-23 @ 21:40  PTT: 26.9 12-04-23 @ 05:05, 29.8 12-03-23 @ 04:30, 30.5 12-02-23 @ 09:55, 30.2 12-02-23 @ 04:00, 25.4 12-01-23 @ 21:40    Stroke Core Measures  TSH- 0.7  LDL- 53  HGBA1C- 5.6        Xray Chest 1 View AP/PA (12.03.23 @ 16:58) >  FINDINGS/  IMPRESSION:    Support devices: Right IJ central venous catheter endotracheal tube and   enteric tube are in satisfactory position.    Cardiac/mediastinum/hilum: Stable.    Lung parenchyma/Pleura: Left basilar atelectasis/consolidation. No   pneumothorax.    Skeleton/soft tissues: Stable.      CT Head No Cont (12.03.23 @ 13:12) >    IMPRESSION:    1.  No significant minimal change in the extensive irregular parenchymal   hemorrhage within the right cerebral hemisphere/basal ganglia, the   intraventricular hemorrhage, and the left frontal/parietal   periventricular parenchymal hemorrhage.    2.  Similar mass effect with right-to-left midline shift measuring 1.6 cm   and right uncal herniation.    3.  Similar compression of the right lateral ventricle and   enlarged/entrapped left lateral ventricle.    4.  Small scattered subarachnoid hemorrhage, new.      VEEG in the last 24 hours: 12/3-- 12/4    Background----continues, asymmetrical with higher amplitude on the right and suppression on the right reaching 5-6 hz    Focal and generalized slowing----------- moderate generalized slowing. 2- moderate right hemispheric and mild to moderate independent left FT slolwing    Interictal activity----------  1- left FT sharp and sharp transients . 2- independent right parasaggital sharp and spike waves    Events-------------- none    Seizures----------none    Impression:  abnormal as above    TTE Echo Complete w/o Contrast w/ Doppler (12.02.23 @ 10:34) >  Summary:   1.Mildly decreased global left ventricular systolic function.   2. LV Ejection Fraction by Arechiga's Method with a biplane EF of 45 %.   3. Global cardiomyopathy.   4. The left ventricular diastolic function could not be assessed in this   study.   5. Mildly reduced RV systolic function.   6. Moderately enlarged left atrium.   7. Moderately enlarged right atrium.   8. Mild mitral valve regurgitation.   9. Mild thickening of the anterior and posterior mitral valve leaflets.  10. Mild tricuspid regurgitation.  11. Mild aortic regurgitation.  12. Dilatation of the ascending aorta.  13. Estimated pulmonary artery systolic pressure is 50.3 mmHg assuming a   right atrial pressure of 15 mmHg, which is consistent with moderate   pulmonary hypertension.            EXAMINATION:  General: ill appearing  HEENT: Anicteric sclerae, orally intubated  Cardiac: [id number]  Lungs: Clear  Abdomen: Soft, non-tender, +BS  Extremities: No c/c/e  Skin/Incision Site: +3 b/l UE and LE edema with venous stasis changes/brawning  Neurologic: stuporous, no EO, downward gaze, perrl 2mm, WD RUE, LUE extensor, b/l LE TFs

## 2023-12-05 NOTE — PROGRESS NOTE ADULT - CRITICAL CARE ATTENDING COMMENT
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  ICH 4    Patient with devastating spontaneous ICH with herniation/IVH/hydrocephalus s/p hyperosmolar therapy for neuroresuscitation, coagulopathy reversal and BP control  Pt family opted against neurosurgical intervention    patient made DNR by family    pall care consult    care as above    poor neurologic prognosis likely.

## 2023-12-05 NOTE — PROGRESS NOTE ADULT - THIS PATIENT HAS THE FOLLOWING CONDITION(S)/DIAGNOSES ON THIS ADMISSION:
ICH/Cerebral Edema/Brain Compression / Herniation/Acute Respiratory Failure
Brain Compression / Herniation
Encephalopathy/Cerebral Edema/Brain Compression / Herniation/Acute Respiratory Failure
Encephalopathy/Cerebral Edema/Brain Compression / Herniation/Acute Respiratory Failure

## 2023-12-05 NOTE — PROGRESS NOTE ADULT - ASSESSMENT
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  ICH 4    sICH likely due to hypertensive emergency, c/b brain herniation, IVH, hydrocephalus  acute respiratory failure due ICH  afib  lactic acidosis  JOHN      NEURO:  q2h coma checks  Cerebral edema -Na goal 145-155  patient with poor neurologic functional outcome expected with optimal medical manage and with or without neurosurgical intervention   vEEG  Sedation- precedex  liveOn consult  Activity: [] OOB as tolerated [x] Bedrest [X] PT [X] OT     PULM:respiratory failure with hypoxic failure from hypervolemia and likely pulmonary HTN decompensation   Lung protective vent settings  SAT/SBT as tolerates  HOB>30    CV:  -140  Nicardipine gtt, Labetalol PRN  at home pt on norvasc, toprol, enalapril, digoxin, statin, coumadin, lasix  LDL 53  echo- lvef 45%, RV moderate reduced function, PASP 50 mmHg  coumadin held and reversed given ICH    RENAL:  JOHN on CKD likely related to contrast-induced nephropathy  pt hypervolemic with increasing oxygen requirements  administer lasix IV and metolazone 5 mg enterally  Crowe in place    GI:  NPO, OGT in place, with tube feeds   GI prophylaxis [x] not indicated [] PPI [x] pepcid while on ventilator  Bowel regimen [] miralax [] senna [] other:  LBM 12/2    ENDO:   Goal -180  a1c- p  tsh- p    HEME/ONC:  VTE prophylaxis: [x] SCDs [] chemoprophylaxis [x] hold chemoprophylaxis due to: ICH (recent coumadin intake at home)  S/p Kcentra and Vitamin K for Coumadin reversal; c/w vit K  Repeat coags q 2 days     ID:  Maintain normothermia    MISC:    RIJ CVC 12/3  crowe 12/2  ETT 12/2    SOCIAL/FAMILY:    CODE STATUS:  [] Full Code [x] DNR [] DNI [x] Palliative consult  plan for family meeting    DISPOSITION:  [x] ICU [] Stroke Unit [] Floor [] CEU [] Tele

## 2023-12-05 NOTE — DISCHARGE NOTE FOR THE EXPIRED PATIENT - HOSPITAL COURSE
ASSESSMENT/PLAN: 73M w/ PMHx of Afib on Coumadin, HTN, HLD, CVA 17yrs ago w/ residual R sided weakness presented on 12/5 with large R basal ganglia ICH w/ SOHAM. GCS 3 on arrival, pt intubated in ED. Hypertensive to 160s, started on Nicardipine gtt. Pt given Kcentra and Vitamin K for reversal of Coumadin. Hyperosmolar therapy administered. Repeat CTH showed  ASSESSMENT/PLAN: 73M w/ PMHx of Afib on Coumadin, HTN, HLD, CVA 17yrs ago w/ residual R sided weakness presented with unresponsiveness on , found to have large R basal ganglia ICH w/ SOHAM. GCS 3 on arrival, pt intubated in ED. Hypertensive to 160s, started on Nicardipine gtt. Pt given Kcentra and Vitamin K for reversal of Coumadin. Hyperosmolar therapy administered. After GOC discussion w/ pt's sister ([email or web address]), pt made DNR; sister declined any surgical intervention. Repeat CTH showed increased size of hemorrhage, hydrocephalus, and brain herniation. Due to poor prognosis pt's sister indicated she did not want any further escalation of care or invasive testing on . Pt  on .

## 2023-12-12 DIAGNOSIS — J96.01 ACUTE RESPIRATORY FAILURE WITH HYPOXIA: ICD-10-CM

## 2023-12-12 DIAGNOSIS — G93.6 CEREBRAL EDEMA: ICD-10-CM

## 2023-12-12 DIAGNOSIS — N17.9 ACUTE KIDNEY FAILURE, UNSPECIFIED: ICD-10-CM

## 2023-12-12 DIAGNOSIS — Z79.01 LONG TERM (CURRENT) USE OF ANTICOAGULANTS: ICD-10-CM

## 2023-12-12 DIAGNOSIS — E78.5 HYPERLIPIDEMIA, UNSPECIFIED: ICD-10-CM

## 2023-12-12 DIAGNOSIS — I61.5 NONTRAUMATIC INTRACEREBRAL HEMORRHAGE, INTRAVENTRICULAR: ICD-10-CM

## 2023-12-12 DIAGNOSIS — R29.723 NIHSS SCORE 23: ICD-10-CM

## 2023-12-12 DIAGNOSIS — Z66 DO NOT RESUSCITATE: ICD-10-CM

## 2023-12-12 DIAGNOSIS — Z74.1 NEED FOR ASSISTANCE WITH PERSONAL CARE: ICD-10-CM

## 2023-12-12 DIAGNOSIS — I69.351 HEMIPLEGIA AND HEMIPARESIS FOLLOWING CEREBRAL INFARCTION AFFECTING RIGHT DOMINANT SIDE: ICD-10-CM

## 2023-12-12 DIAGNOSIS — N18.30 CHRONIC KIDNEY DISEASE, STAGE 3 UNSPECIFIED: ICD-10-CM

## 2023-12-12 DIAGNOSIS — I16.1 HYPERTENSIVE EMERGENCY: ICD-10-CM

## 2023-12-12 DIAGNOSIS — I48.91 UNSPECIFIED ATRIAL FIBRILLATION: ICD-10-CM

## 2023-12-12 DIAGNOSIS — I61.8 OTHER NONTRAUMATIC INTRACEREBRAL HEMORRHAGE: ICD-10-CM

## 2023-12-12 DIAGNOSIS — E87.20 ACIDOSIS, UNSPECIFIED: ICD-10-CM

## 2023-12-12 DIAGNOSIS — I12.9 HYPERTENSIVE CHRONIC KIDNEY DISEASE WITH STAGE 1 THROUGH STAGE 4 CHRONIC KIDNEY DISEASE, OR UNSPECIFIED CHRONIC KIDNEY DISEASE: ICD-10-CM

## 2023-12-12 DIAGNOSIS — G91.9 HYDROCEPHALUS, UNSPECIFIED: ICD-10-CM

## 2023-12-12 DIAGNOSIS — G93.5 COMPRESSION OF BRAIN: ICD-10-CM

## 2024-01-29 NOTE — PATIENT PROFILE ADULT - FUNCTIONAL ASSESSMENT - BASIC MOBILITY 2.
SUBJECTIVE:   Presents to the office in follow-up today.  Overall has been doing very well.  Denies any chest pain chest discomfort lightheadedness or dizziness.  No cough or hemoptysis.  No change in bowel habits.  No melena.  No hematochezia.  Blood sugars for the most part has been controlled.  He does note some early morning spikes.  He has been working with dietary measures.  Doing well with blood pressure cholesterol medications.  He defers immunizations at this time.    Allergies, Medications, Problem List, Past Medical and Surgical Histories:  As reviewed in EPIC.     PHYSICAL EXAMINATION:   GENERAL:  Pleasant 56 year old male    VITAL SIGNS:  As noted.   HEENT:  Oropharynx with no lesions.   NECK:  Supple.  Carotids 2+.  No bruits, lymphadenopathy or thyromegaly.   CARDIAC:  Regular rate and rhythm.  Normal S1, S2.  No rubs, murmurs or gallops.   LUNGS:  Clear to auscultation and percussion.   ABDOMEN:  Bowel sounds normoactive.  Soft, nontender, nondistended.   EXTREMITIES:  No clubbing, cyanosis or edema.      ASSESSMENT AND PLAN:   Type 2 diabetes mellitus without complication, without long-term current use of insulin (CMD)  Diabetic foot care issues reviewed and discussed with the patient doing well at this time.  Follow-up A1c.  Issues with early morning spikes reviewed and discussed with the patient.  Will discuss potential changes after A1c reviewed  - Glycohemoglobin; Future    Hypertension  Blood pressure is well controlled no change continue current regimen follow-up labs today  - Comprehensive Metabolic Panel; Future    Hyperlipidemia  Recheck lipid profile  - Lipid Panel With Reflex; Future    Patient defers immunizations at this time.  He is aware of risks benefits   3 = A little assistance
